# Patient Record
Sex: FEMALE | Race: WHITE | Employment: UNEMPLOYED | URBAN - METROPOLITAN AREA
[De-identification: names, ages, dates, MRNs, and addresses within clinical notes are randomized per-mention and may not be internally consistent; named-entity substitution may affect disease eponyms.]

---

## 2020-09-24 ENCOUNTER — APPOINTMENT (OUTPATIENT)
Dept: ULTRASOUND IMAGING | Age: 20
End: 2020-09-24
Payer: COMMERCIAL

## 2020-09-24 ENCOUNTER — HOSPITAL ENCOUNTER (EMERGENCY)
Age: 20
Discharge: HOME | End: 2020-09-24
Attending: EMERGENCY MEDICINE
Payer: COMMERCIAL

## 2020-09-24 VITALS
TEMPERATURE: 98.6 F | WEIGHT: 200 LBS | DIASTOLIC BLOOD PRESSURE: 92 MMHG | RESPIRATION RATE: 14 BRPM | BODY MASS INDEX: 32.14 KG/M2 | SYSTOLIC BLOOD PRESSURE: 148 MMHG | OXYGEN SATURATION: 100 % | HEIGHT: 66 IN | HEART RATE: 85 BPM

## 2020-09-24 LAB
-: ABNORMAL
ABO/RH: NORMAL
AMORPHOUS: ABNORMAL
ANTIBODY SCREEN: NEGATIVE
ARM BAND NUMBER: NORMAL
BACTERIA: ABNORMAL
BILIRUBIN URINE: NEGATIVE
CASTS UA: ABNORMAL /LPF (ref 0–8)
COLOR: YELLOW
CRYSTALS, UA: ABNORMAL /HPF
DIRECT EXAM: ABNORMAL
EPITHELIAL CELLS UA: ABNORMAL /HPF (ref 0–5)
EXPIRATION DATE: NORMAL
GLUCOSE URINE: NEGATIVE
HCG QUANTITATIVE: 17 IU/L
HCT VFR BLD CALC: 42.3 % (ref 36.3–47.1)
HEMOGLOBIN: 14.1 G/DL (ref 11.9–15.1)
KETONES, URINE: NEGATIVE
LEUKOCYTE ESTERASE, URINE: ABNORMAL
Lab: ABNORMAL
MUCUS: ABNORMAL
NITRITE, URINE: NEGATIVE
OTHER OBSERVATIONS UA: ABNORMAL
PH UA: 5.5 (ref 5–8)
PROTEIN UA: ABNORMAL
RBC UA: ABNORMAL /HPF (ref 0–4)
RENAL EPITHELIAL, UA: ABNORMAL /HPF
SPECIFIC GRAVITY UA: 1.02 (ref 1–1.03)
SPECIMEN DESCRIPTION: ABNORMAL
TRICHOMONAS: ABNORMAL
TURBIDITY: CLEAR
URINE HGB: ABNORMAL
UROBILINOGEN, URINE: NORMAL
WBC UA: ABNORMAL /HPF (ref 0–5)
YEAST: ABNORMAL

## 2020-09-24 PROCEDURE — 99284 EMERGENCY DEPT VISIT MOD MDM: CPT

## 2020-09-24 PROCEDURE — 87510 GARDNER VAG DNA DIR PROBE: CPT

## 2020-09-24 PROCEDURE — 84702 CHORIONIC GONADOTROPIN TEST: CPT

## 2020-09-24 PROCEDURE — 86403 PARTICLE AGGLUT ANTBDY SCRN: CPT

## 2020-09-24 PROCEDURE — 87480 CANDIDA DNA DIR PROBE: CPT

## 2020-09-24 PROCEDURE — 86850 RBC ANTIBODY SCREEN: CPT

## 2020-09-24 PROCEDURE — 6360000002 HC RX W HCPCS: Performed by: EMERGENCY MEDICINE

## 2020-09-24 PROCEDURE — 81001 URINALYSIS AUTO W/SCOPE: CPT

## 2020-09-24 PROCEDURE — 87591 N.GONORRHOEAE DNA AMP PROB: CPT

## 2020-09-24 PROCEDURE — 86901 BLOOD TYPING SEROLOGIC RH(D): CPT

## 2020-09-24 PROCEDURE — 85018 HEMOGLOBIN: CPT

## 2020-09-24 PROCEDURE — 76817 TRANSVAGINAL US OBSTETRIC: CPT

## 2020-09-24 PROCEDURE — 96372 THER/PROPH/DIAG INJ SC/IM: CPT

## 2020-09-24 PROCEDURE — 87086 URINE CULTURE/COLONY COUNT: CPT

## 2020-09-24 PROCEDURE — 86900 BLOOD TYPING SEROLOGIC ABO: CPT

## 2020-09-24 PROCEDURE — 87660 TRICHOMONAS VAGIN DIR PROBE: CPT

## 2020-09-24 PROCEDURE — 6370000000 HC RX 637 (ALT 250 FOR IP): Performed by: EMERGENCY MEDICINE

## 2020-09-24 PROCEDURE — 87491 CHLMYD TRACH DNA AMP PROBE: CPT

## 2020-09-24 PROCEDURE — 85014 HEMATOCRIT: CPT

## 2020-09-24 RX ORDER — ACETAMINOPHEN 325 MG/1
650 TABLET ORAL EVERY 6 HOURS PRN
Qty: 20 TABLET | Refills: 0 | Status: SHIPPED | OUTPATIENT
Start: 2020-09-24 | End: 2021-06-09

## 2020-09-24 RX ORDER — CEPHALEXIN 500 MG/1
500 CAPSULE ORAL ONCE
Status: COMPLETED | OUTPATIENT
Start: 2020-09-24 | End: 2020-09-24

## 2020-09-24 RX ORDER — CEPHALEXIN 500 MG/1
500 CAPSULE ORAL 2 TIMES DAILY
Qty: 20 CAPSULE | Refills: 0 | Status: SHIPPED | OUTPATIENT
Start: 2020-09-24 | End: 2020-10-04

## 2020-09-24 RX ORDER — METRONIDAZOLE 500 MG/1
500 TABLET ORAL 2 TIMES DAILY
Qty: 20 TABLET | Refills: 0 | Status: SHIPPED | OUTPATIENT
Start: 2020-09-24 | End: 2020-10-04

## 2020-09-24 RX ORDER — ACETAMINOPHEN 325 MG/1
650 TABLET ORAL ONCE
Status: COMPLETED | OUTPATIENT
Start: 2020-09-24 | End: 2020-09-24

## 2020-09-24 RX ADMIN — CEPHALEXIN 500 MG: 500 CAPSULE ORAL at 22:29

## 2020-09-24 RX ADMIN — ACETAMINOPHEN 650 MG: 325 TABLET ORAL at 19:05

## 2020-09-24 RX ADMIN — HUMAN RHO(D) IMMUNE GLOBULIN 300 MCG: 300 INJECTION, SOLUTION INTRAMUSCULAR at 22:27

## 2020-09-24 ASSESSMENT — ENCOUNTER SYMPTOMS
DIARRHEA: 0
VOMITING: 0
SHORTNESS OF BREATH: 0
BACK PAIN: 0
ALLERGIC/IMMUNOLOGIC COMMENTS: NKDA
CONSTIPATION: 0
ABDOMINAL PAIN: 1
NAUSEA: 0

## 2020-09-24 ASSESSMENT — PAIN DESCRIPTION - FREQUENCY: FREQUENCY: CONTINUOUS

## 2020-09-24 ASSESSMENT — PAIN DESCRIPTION - ORIENTATION: ORIENTATION: LOWER

## 2020-09-24 ASSESSMENT — PAIN SCALES - GENERAL: PAINLEVEL_OUTOF10: 5

## 2020-09-24 ASSESSMENT — PAIN DESCRIPTION - DESCRIPTORS: DESCRIPTORS: CRAMPING

## 2020-09-24 ASSESSMENT — PAIN DESCRIPTION - LOCATION: LOCATION: ABDOMEN

## 2020-09-24 ASSESSMENT — PAIN DESCRIPTION - PAIN TYPE: TYPE: ACUTE PAIN

## 2020-09-24 NOTE — ED PROVIDER NOTES
[facility]  Emergency Department Encounter  Emergency Medicine Resident     Pt Name: Abraham Herrera  MRN: [id number]  [place] 2000  Date of evaluation: 9/24/20  PCP:  GORDON Hair [id number]       Chief Complaint   Patient presents with    Abdominal Cramping     cramping and mild spotting    Pregnancy Test     had positive preg test yesterday       HISTORY OF PRESENT ILLNESS  (Location/Symptom, Timing/Onset, Context/Setting, Quality, Duration, Modifying Factors, Severity.)    Abraham Herrera is a 21 y.o. female who presents with 1 day of abdominal cramping, lower abdominal pain, vaginal bleeding. She describes the vaginal bleeding is light and spotting. Patient has irregular periods and her last menstrual cycle was approximately 3 months ago. She had 3+ urine home pregnancy tests yesterday. This is her first pregnancy. No urinary symptoms. No concern for any STDs. No lightheaded or dizziness, weakness, back pain, syncope. PAST MEDICAL / SURGICAL / SOCIAL / FAMILY HISTORY    has no past medical history on file. Denies   has no past surgical history on file.   Denies  Social History     Socioeconomic History    Marital status: Single     Spouse name: Not on file    Number of children: Not on file    Years of education: Not on file    Highest education level: Not on file   Occupational History    Not on file   Social Needs    Financial resource strain: Not on file    Food insecurity     Worry: Not on file     Inability: Not on file    Transportation needs     Medical: Not on file     Non-medical: Not on file   Tobacco Use    Smoking status: Never Smoker   Substance and Sexual Activity    Alcohol use: Not Currently    Drug use: Never    Sexual activity: Not on file   Lifestyle    Physical activity     Days per week: Not on file     Minutes per session: Not on file    Stress: Not on file   Relationships    Social connections     Talks on phone: Not on file     Gets together: Not on file     Attends [employer] service: Not on file     Active member of club or organization: Not on file     Attends meetings of clubs or organizations: Not on file     Relationship status: Not on file    Intimate partner violence     Fear of current or ex partner: Not on file     Emotionally abused: Not on file     Physically abused: Not on file     Forced sexual activity: Not on file   Other Topics Concern    Not on file   Social History Narrative    Not on file       History reviewed. No pertinent family history. Allergies:    Patient has no known allergies. Home Medications:  Prior to Admission medications    Medication Sig Start Date End Date Taking? Authorizing Provider   cephALEXin (KEFLEX) 500 MG capsule Take 1 capsule by mouth 2 times daily for 10 days 9/24/20 10/4/20 Yes Joy Lee, DO   acetaminophen (TYLENOL) 325 MG tablet Take 2 tablets by mouth every 6 hours as needed for Pain 9/24/20  Yes Joy Lee, DO   metroNIDAZOLE (FLAGYL) 500 MG tablet Take 1 tablet by mouth 2 times daily for 10 days 9/24/20 10/4/20 Yes Joy Lee DO       REVIEW OF SYSTEMS    (2-9 systems for level 4, 10 or more for level 5)    Review of Systems   Constitutional: Negative for chills, diaphoresis and fever. Respiratory: Negative for shortness of breath. Cardiovascular: Negative for chest pain and palpitations. Gastrointestinal: Positive for abdominal pain. Negative for constipation, diarrhea, nausea and vomiting. Endocrine: Negative for polyuria. Genitourinary: Positive for menstrual problem, pelvic pain and vaginal bleeding. Negative for decreased urine volume, difficulty urinating, dysuria, flank pain, frequency and urgency. Musculoskeletal: Negative for back pain and myalgias. Skin: Negative for pallor. Allergic/Immunologic:        NKDA   Neurological: Negative for dizziness, syncope, weakness and light-headedness.    Hematological: Does not Behavior normal.         DIFFERENTIAL  DIAGNOSIS   PLAN (LABS / IMAGING / EKG):  Orders Placed This Encounter   Procedures    C.trachomatis N.gonorrhoeae DNA    VAGINITIS DNA PROBE    Culture, Urine    US OB TRANSVAGINAL    HEMOGLOBIN AND HEMATOCRIT, BLOOD    HCG, QUANTITATIVE, PREGNANCY    Urinalysis with Microscopic    Vaginal exam    TYPE AND SCREEN    RHOGAM INJECTION ONLY    Insert peripheral IV       MEDICATIONS ORDERED:  Orders Placed This Encounter   Medications    acetaminophen (TYLENOL) tablet 650 mg    cephALEXin (KEFLEX) capsule 500 mg     Order Specific Question:   Antimicrobial Indications     Answer:   Urinary Tract Infection     Order Specific Question:   UTI duration of therapy     Answer:   10 days    rho(D) immune globulin (HYPERRHO S/D) injection 300 mcg    cephALEXin (KEFLEX) 500 MG capsule     Sig: Take 1 capsule by mouth 2 times daily for 10 days     Dispense:  20 capsule     Refill:  0    acetaminophen (TYLENOL) 325 MG tablet     Sig: Take 2 tablets by mouth every 6 hours as needed for Pain     Dispense:  20 tablet     Refill:  0    metroNIDAZOLE (FLAGYL) 500 MG tablet     Sig: Take 1 tablet by mouth 2 times daily for 10 days     Dispense:  20 tablet     Refill:  0       DDX:   Implantation bleeding, spontaneous , ectopic pregnancy, early intrauterine pregnancy, urinary tract infection    DIAGNOSTIC RESULTS / EMERGENCYDEPARTMENT COURSE / MDM   LABS:  Labs Reviewed   VAGINITIS DNA PROBE - Abnormal; Notable for the following components:       Result Value    Direct Exam POSITIVE for Gardnerella vaginalis.  (*)     All other components within normal limits   HCG, QUANTITATIVE, PREGNANCY - Abnormal; Notable for the following components:    hCG Quant 17 (*)     All other components within normal limits   URINALYSIS WITH MICROSCOPIC - Abnormal; Notable for the following components:    Urine Hgb LARGE (*)     Protein, UA 1+ (*)     Leukocyte Esterase, Urine MODERATE (*) Bacteria, UA FEW (*)     All other components within normal limits   C.TRACHOMATIS N.GONORRHOEAE DNA   CULTURE, URINE   HEMOGLOBIN AND HEMATOCRIT, BLOOD   TYPE AND SCREEN   RHOGAM INJECTION ONLY       RADIOLOGY:  Us Ob Transvaginal    Result Date: 9/24/2020  EXAMINATION: FIRST TRIMESTER OBSTETRIC ULTRASOUND 9/24/2020 TECHNIQUE: Transvaginal first trimester obstetric pelvic ultrasound was performed with color Doppler flow evaluation. COMPARISON: None HISTORY: ORDERING SYSTEM PROVIDED HISTORY: + pregnancy, vaginal bleeding, abd cramping TECHNOLOGIST PROVIDED HISTORY: + pregnancy, vaginal bleeding, abd cramping FINDINGS: Uterus: 8.1 x 5.4 x 4.1 cm Gestational Sac(s):  Not visualized Yolk Sac:  Not visualized Fetal Pole:  Not visualized Crown Rump Length:  Not visualized Fetal Heart Rate:  Not visualized Right ovary: 3.6 x 3.4 x 2.6 cm. Arterial and venous flow identified to the right ovary. Left ovary: 2.2 x 3.5 x 2.3 cm. Arterial and venous flow identified to the left ovary. Free fluid: None     No intrauterine pregnancy identified. No gestational sac or yolk sac. Arterial and venous spectral flow identified to the bilateral ovaries. EMERGENCY DEPARTMENT COURSE:  ED Course as of Sep 24 2212   Thu Sep 24, 2020   2014 Hemoglobin Quant: 14.1 [AM]   2014 Pelvic done    [AM]   2045 hCG Quant(!): 17 [AM]   2121 DIRECT EXAM.(!): POSITIVE for Gardnerella vaginalis. [AM]   2133 Urinalysis with Microscopic(!):    Color, UA YELLOW   Turbidity UA CLEAR   Glucose, UA NEGATIVE   Bilirubin, Urine NEGATIVE   Ketones, Urine NEGATIVE   Specific Gravity, UA 1.020   Urine Hgb LARGE(!)   pH, UA 5.5   Protein, UA 1+(!)   Urobilinogen, Urine Normal   Nitrite, Urine NEGATIVE   Leukocyte Esterase, Urine MODERATE(!)   -        WBC, UA 20 TO 50   RBC, UA 5 TO 10   Casts UA 0 TO 2 HYALINE Reference range defined for non-centrifuged specimen. Crystals, UA NOT REPORTED   Epithelial Cells, UA 2 TO 5   Ta. .. [AM]   2158  OB TRANSVAGINAL [AM]      ED Course User Index  [AM] Mayelin Pouch, DO       MDM  Number of Diagnoses or Management Options  Acute cystitis with hematuria:   Bacterial vaginosis:   Vaginal bleeding in pregnancy, first trimester:   Diagnosis management comments: 55-year-old female G1, P0 presents to the emergency department with at home positive pregnancy test and abdominal cramping and vaginal bleeding. Lower abdominal tenderness to palpation on exam.  Pelvic done with scant mucoid discharge with no active vaginal bleeding. hCG serum 17. Patient O-, administered RhoGam during visit. Transvaginal ultrasound did not show any evidence of pregnancy. Discussed with patient that she is either very early on in pregnancy where she is having a miscarriage. We discussed in length the importance of her having her blood drawn in 48 hours for repeat hormone levels and we discussed following up with the [facility] clinic. Urinalysis concerning for infection. Vaginitis probe positive for bacterial vaginosis. Patient discharged home on Keflex and Flagyl as well as Tylenol for the pain. Amount and/or Complexity of Data Reviewed  Clinical lab tests: ordered and reviewed  Tests in the radiology section of CPT®: ordered and reviewed  Review and summarize past medical records: yes  Discuss the patient with other providers: yes  Independent visualization of images, tracings, or specimens: yes    Patient Progress  Patient progress: stable    CONSULTS:  None    CRITICAL CARE:  Please see attending note    FINAL IMPRESSION     1. Vaginal bleeding in pregnancy, first trimester    2. Bacterial vaginosis    3. Acute cystitis with hematuria      DISPOSITION / PLAN   DISPOSITION        Evaluation and treatment course in the ED, and plan of care upon discharge was discussed in length with the patient. Patient had no further questions prior to being discharged and was instructed to return to the ED for new or worsening symptoms. Any changes to existing medications or new prescriptions were reviewed with patient and they expressed understanding of how to correctly take their medications and the possible side effects. PATIENT REFERRED TO:  GORDON Christianson CNP  [address] [facility] [address]  [address] [facility] [address]  [address] [facility]  [phone]    As needed, If symptoms worsen    [address] [facility]  [phone]  Schedule an appointment as soon as possible for a visit   you need to have your blood drawn in 48 hours to check your hormone levels      DISCHARGE MEDICATIONS:  New Prescriptions    ACETAMINOPHEN (TYLENOL) 325 MG TABLET    Take 2 tablets by mouth every 6 hours as needed for Pain    CEPHALEXIN (KEFLEX) 500 MG CAPSULE    Take 1 capsule by mouth 2 times daily for 10 days    METRONIDAZOLE (FLAGYL) 500 MG TABLET    Take 1 tablet by mouth 2 times daily for 10 days       Joy Jackson  Emergency Medicine Resident Physician, PGY-3    (Please note that portions of this note were completed with a voice recognition program.  Efforts were made to edit the dictations but occasionally words are mis-transcribed.)        [facility], DO  Resident  09/24/20 8980

## 2020-09-24 NOTE — ED PROVIDER NOTES
[facility]     Emergency Department     Faculty Attestation    I performed a history and physical examination of the patient and discussed management with the resident. I reviewed the residents note and agree with the documented findings and plan of care. Any areas of disagreement are noted on the chart. I was personally present for the key portions of any procedures. I have documented in the chart those procedures where I was not present during the key portions. I have reviewed the emergency nurses triage note. I agree with the chief complaint, past medical history, past surgical history, allergies, medications, social and family history as documented unless otherwise noted below. For Physician Assistant/ Nurse Practitioner cases/documentation I have personally evaluated this patient and have completed at least one if not all key elements of the E/M (history, physical exam, and MDM). Additional findings are as noted. Primary Care Physician:  GORDON Hardy - CNP    CHIEF COMPLAINT       Chief Complaint   Patient presents with    Abdominal Cramping     cramping and mild spotting    Pregnancy Test     had positive preg test yesterday       RECENT VITALS:   Temp: 98.6 °F (37 °C),  Pulse: 85, Resp: 14, BP: (!) 148/92    LABS:  Labs Reviewed   C.TRACHOMATIS N.GONORRHOEAE DNA   VAGINITIS DNA PROBE   CULTURE, URINE   HEMOGLOBIN AND HEMATOCRIT, BLOOD   HCG, QUANTITATIVE, PREGNANCY   URINALYSIS WITH MICROSCOPIC   TYPE AND SCREEN       Radiology  US OB TRANSVAGINAL    (Results Pending)         Attending Physician Additional  Notes    The patient is a 54-year-old female who is a G1, P0 who presents for evaluation of vaginal bleeding and suprapubic cramping. The patient reports that she has history of irregular menstrual periods and she has been having unprotected intercourse with her boyfriend. She states that her last menstrual period was approximately 3 months ago.   The patient reports that she was at work when she began having intermittent, dull, achy, suprapubic abdominal cramping with associated vaginal spotting. Upon arrival to the emergency department she states that her symptoms have resolved. She has taken 3 home pregnancy tests which were all positive. The patient has not taken any medications for her symptoms and does not list any provoking or palliating factors. She denies any leakage of fluid, contractions, or vaginal discharge. The patient denies fever, chills, headache, vision changes, nausea, vomiting, chest pain, shortness of breath, urinary/bowel symptoms, recent injury or illness. Vital signs are stable. Heart regular rate and rhythm. Lungs clear to auscultation. Abdomen soft and nontender on my exam.  Capillary fill less than 2 seconds. No CVA tenderness. Plan for resident to perform pelvic exam.  Plan to obtain pelvic swabs, hCG quantitative, H&H, type and screen, urinalysis with separate culture and transvaginal ultrasound. I suspect the patient is either having a threatened miscarriage or nonspecific vaginal bleeding in early pregnancy.           Jeffery Webb DO  Attending Emergency Physician           Jeffery Webb DO  09/24/20 1934

## 2020-09-24 NOTE — ED NOTES
Pt to ED with c/o lower abd cramping, states she took home preg test yesterday and it was positive. Today pt states she had mild bleeding. Pt in NAD, rr even and unlabored. Pt in NAD.       Dary Hahn RN  09/24/20 [id number]

## 2020-09-25 LAB
C TRACH DNA GENITAL QL NAA+PROBE: ABNORMAL
CULTURE: ABNORMAL
Lab: ABNORMAL
N. GONORRHOEAE DNA: NEGATIVE
SPECIMEN DESCRIPTION: ABNORMAL
SPECIMEN DESCRIPTION: ABNORMAL

## 2020-09-25 NOTE — ED NOTES
Pt resting on cot, NAD noted, significant other at bedside. Awaiting pelvic labs and US.       Dary Hahn RN  09/24/20 2026

## 2020-09-25 NOTE — ED PROVIDER NOTES
[address] [facility]  Emergency Department  Emergency Medicine Resident Sign-out     Care of Nickolas Aguillon was assumed from Dr. Drew Mares and is being seen for Abdominal Cramping (cramping and mild spotting) and Pregnancy Test (had positive preg test yesterday)  . The patient's initial evaluation and plan have been discussed with the prior provider who initially evaluated the patient. EMERGENCY DEPARTMENT COURSE / MEDICAL DECISION MAKING:       MEDICATIONS GIVEN:  Orders Placed This Encounter   Medications    acetaminophen (TYLENOL) tablet 650 mg    cephALEXin (KEFLEX) capsule 500 mg     Order Specific Question:   Antimicrobial Indications     Answer:   Urinary Tract Infection     Order Specific Question:   UTI duration of therapy     Answer:   10 days    rho(D) immune globulin (HYPERRHO S/D) injection 300 mcg    cephALEXin (KEFLEX) 500 MG capsule     Sig: Take 1 capsule by mouth 2 times daily for 10 days     Dispense:  20 capsule     Refill:  0    acetaminophen (TYLENOL) 325 MG tablet     Sig: Take 2 tablets by mouth every 6 hours as needed for Pain     Dispense:  20 tablet     Refill:  0    metroNIDAZOLE (FLAGYL) 500 MG tablet     Sig: Take 1 tablet by mouth 2 times daily for 10 days     Dispense:  20 tablet     Refill:  0       LABS / RADIOLOGY:     Labs Reviewed   VAGINITIS DNA PROBE - Abnormal; Notable for the following components:       Result Value    Direct Exam POSITIVE for Gardnerella vaginalis.  (*)     All other components within normal limits   HCG, QUANTITATIVE, PREGNANCY - Abnormal; Notable for the following components:    hCG Quant 17 (*)     All other components within normal limits   URINALYSIS WITH MICROSCOPIC - Abnormal; Notable for the following components:    Urine Hgb LARGE (*)     Protein, UA 1+ (*)     Leukocyte Esterase, Urine MODERATE (*)     Bacteria, UA FEW (*)     All other components within normal limits   C.TRACHOMATIS N.GONORRHOEAE DNA   CULTURE, URINE HEMOGLOBIN AND HEMATOCRIT, BLOOD   TYPE AND SCREEN   RHOGAM INJECTION ONLY       Us Ob Transvaginal    Result Date: 9/24/2020  EXAMINATION: FIRST TRIMESTER OBSTETRIC ULTRASOUND 9/24/2020 TECHNIQUE: Transvaginal first trimester obstetric pelvic ultrasound was performed with color Doppler flow evaluation. COMPARISON: None HISTORY: ORDERING SYSTEM PROVIDED HISTORY: + pregnancy, vaginal bleeding, abd cramping TECHNOLOGIST PROVIDED HISTORY: + pregnancy, vaginal bleeding, abd cramping FINDINGS: Uterus: 8.1 x 5.4 x 4.1 cm Gestational Sac(s):  Not visualized Yolk Sac:  Not visualized Fetal Pole:  Not visualized Crown Rump Length:  Not visualized Fetal Heart Rate:  Not visualized Right ovary: 3.6 x 3.4 x 2.6 cm. Arterial and venous flow identified to the right ovary. Left ovary: 2.2 x 3.5 x 2.3 cm. Arterial and venous flow identified to the left ovary. Free fluid: None     No intrauterine pregnancy identified. No gestational sac or yolk sac. Arterial and venous spectral flow identified to the bilateral ovaries. RECENT VITALS:     Temp: 98.6 °F (37 °C),  Pulse: 85, Resp: 14, BP: (!) 148/92, SpO2: 100 %    This patient is a 21 y.o. Female with abdominal pain vaginal cramping with 3+ home pregnancy tests. History of O- and receiving RhoGam.  Transvaginal ultrasound was negative for any free fluid in the pelvis or any other signs and has a serum hCG of 17. Patient is to follow-up in 48 hours for repeat hCG and has an appointment already made with [facility] clinic. In addition she has had a UTI and is received Keflex for a biotics. Patient also has BV and is getting a prescription for Flagyl. OUTSTANDING TASKS / RECOMMENDATIONS:    1. Await discharge     FINAL IMPRESSION:     1. Vaginal bleeding in pregnancy, first trimester    2. Bacterial vaginosis    3.  Acute cystitis with hematuria        DISPOSITION:         DISPOSITION:  [x]  Discharge   []  Transfer -    []  Admission -     []  Against Medical Advice   []  Eloped   FOLLOW-UP: GORDON Burns CNP  [address] [facility] [address]  [address] [facility] [address]  [address] [place] [address]  [phone]    As needed, If symptoms worsen    [address] [facility] [address]  [address] [facility]  [phone]  Schedule an appointment as soon as possible for a visit   you need to have your blood drawn in 48 hours to check your hormone levels     DISCHARGE MEDICATIONS: New Prescriptions    ACETAMINOPHEN (TYLENOL) 325 MG TABLET    Take 2 tablets by mouth every 6 hours as needed for Pain    CEPHALEXIN (KEFLEX) 500 MG CAPSULE    Take 1 capsule by mouth 2 times daily for 10 days    METRONIDAZOLE (FLAGYL) 500 MG TABLET    Take 1 tablet by mouth 2 times daily for 10 days           Bianca Pang DO  Emergency Medicine Resident  [facility]       [facility]  Resident  09/24/20 7275

## 2020-09-26 LAB
BLD PROD TYP BPU: NORMAL
DISPENSE STATUS BLOOD BANK: NORMAL
TRANSFUSION STATUS: NORMAL
UNIT DIVISION: 0
UNIT NUMBER: NORMAL

## 2020-09-28 ENCOUNTER — TELEPHONE (OUTPATIENT)
Dept: PHARMACY | Age: 20
End: 2020-09-28

## 2020-09-28 RX ORDER — AZITHROMYCIN 500 MG/1
1000 TABLET, FILM COATED ORAL ONCE
Qty: 2 TABLET | Refills: 0 | Status: SHIPPED | OUTPATIENT
Start: 2020-09-28 | End: 2020-09-28

## 2020-09-28 NOTE — TELEPHONE ENCOUNTER
CLINICAL PHARMACY NOTE:  Telephone Follow-up for Positive STD Test    At the time of Brian Lama's visit to [facility] Emergency Department on 9/24/2020 STD testing was performed. DNA testing was positive for Chlamydia. Spoke to patient on 9/28/2020 to review test results and regarding need to start oral antibiotic therapy to treat the infection. Instructed patient to abstain from sexual intercourse until receiving the antibiotic and then for 7 days after treatment. Patient also advised to inform any partners that they will need to be tested as well. Patient verbally expressed understanding of above plan. Per protocol, prescription for azithromycin 500 mg PO x 2 tablets sent to [facility] on behalf of Dr. Andre Galaviz.           Jeet Gr, Pharmacy Student

## 2020-09-28 NOTE — TELEPHONE ENCOUNTER
Attempt #1 to call patient to review results of recent STD testing. No answer, left VM for patient to call back at [phone].     Shawn Frausto, Pharmacy Student  9/28/2020 @9:42am

## 2020-09-30 ENCOUNTER — HOSPITAL ENCOUNTER (EMERGENCY)
Age: 20
Discharge: HOME | End: 2020-10-01
Attending: EMERGENCY MEDICINE
Payer: COMMERCIAL

## 2020-09-30 ENCOUNTER — APPOINTMENT (OUTPATIENT)
Dept: ULTRASOUND IMAGING | Age: 20
End: 2020-09-30
Payer: COMMERCIAL

## 2020-09-30 VITALS
DIASTOLIC BLOOD PRESSURE: 81 MMHG | WEIGHT: 200 LBS | HEIGHT: 66 IN | RESPIRATION RATE: 16 BRPM | BODY MASS INDEX: 32.14 KG/M2 | TEMPERATURE: 97.9 F | SYSTOLIC BLOOD PRESSURE: 139 MMHG | OXYGEN SATURATION: 96 % | HEART RATE: 91 BPM

## 2020-09-30 LAB
BLD PROD TYP BPU: NORMAL
DISPENSE STATUS BLOOD BANK: NORMAL
HCG QUANTITATIVE: 401 IU/L
TRANSFUSION STATUS: NORMAL
UNIT DIVISION: 0
UNIT NUMBER: NORMAL

## 2020-09-30 PROCEDURE — 99284 EMERGENCY DEPT VISIT MOD MDM: CPT

## 2020-09-30 PROCEDURE — 76817 TRANSVAGINAL US OBSTETRIC: CPT

## 2020-09-30 PROCEDURE — 84702 CHORIONIC GONADOTROPIN TEST: CPT

## 2020-09-30 ASSESSMENT — PAIN DESCRIPTION - LOCATION: LOCATION: ABDOMEN

## 2020-09-30 ASSESSMENT — PAIN DESCRIPTION - PAIN TYPE: TYPE: ACUTE PAIN

## 2020-09-30 ASSESSMENT — PAIN DESCRIPTION - ORIENTATION: ORIENTATION: LOWER

## 2020-09-30 ASSESSMENT — PAIN DESCRIPTION - DESCRIPTORS: DESCRIPTORS: CRAMPING

## 2020-09-30 ASSESSMENT — PAIN SCALES - GENERAL: PAINLEVEL_OUTOF10: 4

## 2020-10-01 ENCOUNTER — OFFICE VISIT (OUTPATIENT)
Dept: OBGYN CLINIC | Age: 20
End: 2020-10-01
Payer: COMMERCIAL

## 2020-10-01 VITALS
HEART RATE: 78 BPM | WEIGHT: 211 LBS | HEIGHT: 66 IN | DIASTOLIC BLOOD PRESSURE: 70 MMHG | BODY MASS INDEX: 33.91 KG/M2 | TEMPERATURE: 98 F | SYSTOLIC BLOOD PRESSURE: 116 MMHG

## 2020-10-01 PROCEDURE — 99213 OFFICE O/P EST LOW 20 MIN: CPT | Performed by: NURSE PRACTITIONER

## 2020-10-01 PROCEDURE — 4004F PT TOBACCO SCREEN RCVD TLK: CPT | Performed by: NURSE PRACTITIONER

## 2020-10-01 PROCEDURE — G8427 DOCREV CUR MEDS BY ELIG CLIN: HCPCS | Performed by: NURSE PRACTITIONER

## 2020-10-01 PROCEDURE — G8419 CALC BMI OUT NRM PARAM NOF/U: HCPCS | Performed by: NURSE PRACTITIONER

## 2020-10-01 PROCEDURE — G8484 FLU IMMUNIZE NO ADMIN: HCPCS | Performed by: NURSE PRACTITIONER

## 2020-10-01 RX ORDER — PNV NO.95/FERROUS FUM/FOLIC AC 28MG-0.8MG
1 TABLET ORAL DAILY
Qty: 30 TABLET | Refills: 12 | Status: SHIPPED | OUTPATIENT
Start: 2020-10-01 | End: 2020-11-01 | Stop reason: SDUPTHER

## 2020-10-01 ASSESSMENT — ENCOUNTER SYMPTOMS
ABDOMINAL PAIN: 0
WHEEZING: 0
RHINORRHEA: 0
ABDOMINAL DISTENTION: 0
DIARRHEA: 0
CHEST TIGHTNESS: 0
PHOTOPHOBIA: 0
VOMITING: 0
CONSTIPATION: 0
NAUSEA: 0
SORE THROAT: 0
TROUBLE SWALLOWING: 0
BACK PAIN: 0
SHORTNESS OF BREATH: 0

## 2020-10-01 NOTE — ED PROVIDER NOTES
[facility]  Emergency Department  Faculty Attestation     I performed a history and physical examination of the patient and discussed management with the resident. I reviewed the residents note and agree with the documented findings and plan of care. Any areas of disagreement are noted on the chart. I was personally present for the key portions of any procedures. I have documented in the chart those procedures where I was not present during the key portions. I have reviewed the emergency nurses triage note. I agree with the chief complaint, past medical history, past surgical history, allergies, medications, social and family history as documented unless otherwise noted below. For Physician Assistant/ Nurse Practitioner cases/documentation I have personally evaluated this patient and have completed at least one if not all key elements of the E/M (history, physical exam, and MDM). Additional findings are as noted. Primary Care Physician:  GORDON Peguero - CNP    Screenings:  [unfilled]    CHIEF COMPLAINT       Chief Complaint   Patient presents with    Vaginal Bleeding     Less than 8 weeks pregnant, has not seen OB yet       RECENT VITALS:   Temp: 97.9 °F (36.6 °C),  Pulse: 91, Resp: 16, BP: 139/81    LABS:  Labs Reviewed   HCG, QUANTITATIVE, PREGNANCY       Radiology  US OB TRANSVAGINAL    (Results Pending)           Attending Physician Additional  Notes    Patient is a G1, P0 Rh- with vaginal bleeding. She is uncertain regarding dates and thought she was 3 months pregnant, chart visit from last week would suggest that she is approximately 8 weeks pregnant. Rh was low on last visit. She did receive RhoGam.  She is had increased pain and cramps. On exam she is nontoxic afebrile vital signs normal.  Normal color, no pallor, normal capillary refill. Abdomen is soft, no pelvic tenderness.   Plan is quantitative hCG, repeat pelvic exam, repeat pelvic ultrasound, Tylenol and reassess. Differential includes threatened miscarriage, incomplete AB, ectopic pregnancy. Casey Armenta.  Malick Riley MD, [address] [facility]  Attending Emergency  Physician                Andrea Murdock MD  09/30/20 2088

## 2020-10-01 NOTE — ED PROVIDER NOTES
[facility] ED  Emergency Department Encounter  Emergency Medicine Resident     Pt Name: Darya Khanna  MRN: [id number]  [place] 2000  Date of evaluation: 9/30/20  PCP:  Marta Miller       Chief Complaint   Patient presents with    Vaginal Bleeding     Less than 8 weeks pregnant, has not seen OB yet       HISTORY OFPRESENT ILLNESS  (Location/Symptom, Timing/Onset, Context/Setting, Quality, Duration, Modifying Сергей Jimenes.)      Darya Khanna is a 21 y.o. female who presents with concerns for painless vaginal bleeding. Patient was seen here on 9/24 where she had a positive pregnancy test with a beta-hCG of 17. Patient also was diagnosed with bacterial vaginitis as well as a urinary tract infection at that time for which she has been treated with Flagyl and Keflex without issue. Patient denies vaginal discharge or dysuria, states that the symptoms have resolved, states that with this pregnancy she is concerned for her pain with vaginal bleeding. Patient has not seen an OB/GYN yet, has a scheduled appointment tomorrow to establish follow-up. Patient denies lightheadedness, dizziness, fever, chills, patient is notably Rh-, received RhoGam on her previous visit to the emergency department secondary to vaginal bleeding at that time. PAST MEDICAL / SURGICAL / SOCIAL / FAMILY HISTORY      has no past medical history on file. has no past surgical history on file.      Social History     Socioeconomic History    Marital status: Single     Spouse name: Not on file    Number of children: Not on file    Years of education: Not on file    Highest education level: Not on file   Occupational History    Not on file   Social Needs    Financial resource strain: Not on file    Food insecurity     Worry: Not on file     Inability: Not on file    Transportation needs     Medical: Not on file     Non-medical: Not on file   Tobacco Use    Smoking status: Never Smoker   Substance and Sexual Activity    Alcohol use: Not Currently    Drug use: Never    Sexual activity: Not on file   Lifestyle    Physical activity     Days per week: Not on file     Minutes per session: Not on file    Stress: Not on file   Relationships    Social connections     Talks on phone: Not on file     Gets together: Not on file     Attends [employer] service: Not on file     Active member of club or organization: Not on file     Attends meetings of clubs or organizations: Not on file     Relationship status: Not on file    Intimate partner violence     Fear of current or ex partner: Not on file     Emotionally abused: Not on file     Physically abused: Not on file     Forced sexual activity: Not on file   Other Topics Concern    Not on file   Social History Narrative    Not on file       History reviewed. No pertinent family history. Allergies:  Patient has no known allergies. Home Medications:  Prior to Admission medications    Medication Sig Start Date End Date Taking? Authorizing Provider   cephALEXin (KEFLEX) 500 MG capsule Take 1 capsule by mouth 2 times daily for 10 days 9/24/20 10/4/20  Joy BEN Lee, DO   acetaminophen (TYLENOL) 325 MG tablet Take 2 tablets by mouth every 6 hours as needed for Pain 9/24/20   Joy M Mukuli, DO   metroNIDAZOLE (FLAGYL) 500 MG tablet Take 1 tablet by mouth 2 times daily for 10 days 9/24/20 10/4/20  Joy M Mukuli, DO       REVIEW OFSYSTEMS    (2-9 systems for level 4, 10 or more for level 5)      Review of Systems   Constitutional: Negative for chills, fatigue and fever. HENT: Negative for hearing loss, rhinorrhea, sneezing, sore throat, tinnitus and trouble swallowing. Eyes: Negative for photophobia and visual disturbance. Respiratory: Negative for chest tightness, shortness of breath and wheezing. Cardiovascular: Negative for chest pain and palpitations.    Gastrointestinal: Negative for abdominal distention, abdominal pain, constipation, diarrhea, nausea and vomiting. Endocrine: Negative for polyuria. Genitourinary: Positive for vaginal bleeding. Negative for dysuria, flank pain, frequency and vaginal discharge. Musculoskeletal: Negative for arthralgias, back pain, gait problem and neck pain. Neurological: Negative for dizziness, tremors, seizures, syncope, facial asymmetry, numbness and headaches. Psychiatric/Behavioral: Negative for self-injury and suicidal ideas. PHYSICAL EXAM   (up to 7 for level 4, 8 or more forlevel 5)      INITIAL VITALS:   ED Triage Vitals [09/30/20 2049]   BP Temp Temp Source Pulse Resp SpO2 Height Weight   -- 97.9 °F (36.6 °C) Oral -- -- -- -- --       Physical Exam  Constitutional:       General: She is not in acute distress. Appearance: She is obese. She is not toxic-appearing or diaphoretic. HENT:      Head: Normocephalic and atraumatic. Eyes:      Extraocular Movements: Extraocular movements intact. Neck:      Musculoskeletal: No neck rigidity or muscular tenderness. Cardiovascular:      Rate and Rhythm: Normal rate and regular rhythm. Pulses: Normal pulses. Pulmonary:      Effort: No respiratory distress. Breath sounds: Normal breath sounds. No wheezing. Abdominal:      General: There is no distension. Palpations: Abdomen is soft. Tenderness: There is no abdominal tenderness. Musculoskeletal: Normal range of motion. Skin:     General: Skin is dry. Neurological:      General: No focal deficit present. Mental Status: She is oriented to person, place, and time. Psychiatric:         Mood and Affect: Mood normal.         Behavior: Behavior normal.         Thought Content:  Thought content normal.         Judgment: Judgment normal.         DIFFERENTIAL  DIAGNOSIS     PLAN (LABS / IMAGING / EKG):  Orders Placed This Encounter   Procedures    US OB TRANSVAGINAL    HCG, QUANTITATIVE, PREGNANCY    RHOGAM INJECTION ONLY MEDICATIONS ORDERED:  Orders Placed This Encounter   Medications    DISCONTD: rho(D) immune globulin (HYPERRHO S/D) injection 300 mcg       DDX: Ectopic pregnancy, threatened , painless vaginal bleeding, vaginal bleeding first trimester pregnancy. Initial MDM/Plan/ED COURSE:    21 y.o. female who presents with concerns for pain as well as vaginal bleeding. Patient beta-hCG was 17 approximately 1 week ago, plan to repeat beta-hCG, plan to get a repeat transvaginal ultrasound in order to assess for ectopic pregnancy versus intrauterine gestation. Patient's beta-hCG is notably 500, transvaginal ultrasound shows no intrauterine pregnancy. Patient is currently denying pain at this time, does have follow-up with her obstetrics and gynecologist tomorrow at 1230, patient told to follow-up in 48 hours for repeat beta-hCG as well as a potential repeat transvaginal ultrasound. Patient has no questions at this time      :     DIAGNOSTIC RESULTS / EMERGENCYDEPARTMENT COURSE / MDM     LABS:  Labs Reviewed   HCG, QUANTITATIVE, PREGNANCY - Abnormal; Notable for the following components:       Result Value    hCG Quant 401 (*)     All other components within normal limits   RHOGAM INJECTION ONLY           Us Ob Transvaginal    Result Date: 2020  EXAMINATION: FIRST TRIMESTER OBSTETRIC ULTRASOUND 2020 TECHNIQUE: Transvaginal first trimester obstetric pelvic ultrasound was performed with color Doppler flow evaluation. COMPARISON: 6 days prior HISTORY: ORDERING SYSTEM PROVIDED HISTORY: concern for ectopic vs intrauterine pregnancy TECHNOLOGIST PROVIDED HISTORY: concern for ectopic vs intrauterine pregnancy FINDINGS: Uterus: 8.9 x 5.6 x 4.2 cm, heterogeneous endometrium measures 1.5 cm in thickness. Gestational Sac(s):  No intrauterine gestational sac is identified. Yolk Sac:  Not identified Fetal Pole:  Not identified Crown Rump Length:  Not measured Fetal Heart Rate:  Not identified Right ovary:  The right ovary measures 3.9 x 2.7 x 2.4 cm and is physiologic in appearance with normal arterial and venous Doppler blood flow. Left ovary: Left ovary measures 3.6 x 2.7 x 1.7 cm and is physiologic in appearance with normal arterial and venous Doppler blood flow. Free fluid: None. No intrauterine gestational sac is identified. Findings could relate to very early pregnancy. Ectopic pregnancy is not excluded. Consider close clinical follow-up with serial beta hCG and short-term follow-up pelvic ultrasound as warranted. PROCEDURES:  None    CONSULTS:  None    CRITICAL CARE:  Please see attending note    FINAL IMPRESSION      1.  Vaginal bleeding in pregnancy          DISPOSITION / PLAN     DISPOSITION Decision To Discharge 09/30/2020 11:51:14 PM      PATIENT REFERRED TO:  [facility]  [address] [place] [address]  [phone]  In 2 days        DISCHARGE MEDICATIONS:  Discharge Medication List as of 9/30/2020 11:52 PM          Atiya Burris MD  Emergency Medicine Resident    (Please note that portions of this note were completed with a voice recognition program.Efforts were made to edit the dictations but occasionally words are mis-transcribed.)       Atiya Burris MD  Resident  10/01/20 [id number]

## 2020-10-01 NOTE — ED NOTES
Pt presents to ED w/ c/o vaginal bleeding, onset last Thursday (8/24). Pt also reports low abdominal cramping rated 4/10, reports relief with Tylenol. Pt states she is using tampons, using 2-3 regular tampons per day, states flow is \"light but steady. \" Pt is in NAD, VSS. Will continue to monitor.      Matt Mathew RN  09/30/20 0799

## 2020-10-01 NOTE — PROGRESS NOTES
Jesus Agosto  10/1/2020    YOB: 2000          The patient was seen today. She is here regarding ER follow up for missed menses. + Quant of 401 20. Vaginal bleeding for the past week. Treated in ER for BV and UTI. U/s on 2020 indicated short term follow up d/t no IUP noted. U/s concerned for early IUP vs ectopic. LMP 3+ months ago. Menses every 1-6 months, lasting 5 days, light to heavy bleeding. Not currently on PNVs. Patient is Rh negative- received rhogam in ER. Her bowels are regular and she is voiding without difficulty. HPI:  Jesus Agosto is a 21 y.o. female  vaginal bleeding, + quant      OB History    Para Term  AB Living   1 0 0 0 0 0   SAB TAB Ectopic Molar Multiple Live Births   0 0 0 0 0 0      # Outcome Date GA Lbr John/2nd Weight Sex Delivery Anes PTL Lv   1 Current                History reviewed. No pertinent past medical history. Past Surgical History:   Procedure Laterality Date    WISDOM TOOTH EXTRACTION         No family history on file.     Social History     Socioeconomic History    Marital status: Single     Spouse name: Not on file    Number of children: Not on file    Years of education: Not on file    Highest education level: Not on file   Occupational History    Not on file   Social Needs    Financial resource strain: Not on file    Food insecurity     Worry: Not on file     Inability: Not on file    Transportation needs     Medical: Not on file     Non-medical: Not on file   Tobacco Use    Smoking status: Never Smoker    Smokeless tobacco: Never Used   Substance and Sexual Activity    Alcohol use: Not Currently    Drug use: Never    Sexual activity: Yes     Partners: Male   Lifestyle    Physical activity     Days per week: Not on file     Minutes per session: Not on file    Stress: Not on file   Relationships    Social connections     Talks on phone: Not on file     Gets together: Not on file     Attends Adventism service: Not on file     Active member of club or organization: Not on file     Attends meetings of clubs or organizations: Not on file     Relationship status: Not on file    Intimate partner violence     Fear of current or ex partner: Not on file     Emotionally abused: Not on file     Physically abused: Not on file     Forced sexual activity: Not on file   Other Topics Concern    Not on file   Social History Narrative    Not on file         MEDICATIONS:  Current Outpatient Medications   Medication Sig Dispense Refill    Prenatal Vit-Fe Fumarate-FA (PRENATAL VITAMINS) 28-0.8 MG TABS Take 1 tablet by mouth daily 30 tablet 12    cephALEXin (KEFLEX) 500 MG capsule Take 1 capsule by mouth 2 times daily for 10 days 20 capsule 0    acetaminophen (TYLENOL) 325 MG tablet Take 2 tablets by mouth every 6 hours as needed for Pain 20 tablet 0    metroNIDAZOLE (FLAGYL) 500 MG tablet Take 1 tablet by mouth 2 times daily for 10 days 20 tablet 0     No current facility-administered medications for this visit. ALLERGIES:  Allergies as of 10/01/2020    (No Known Allergies)         REVIEW OF SYSTEMS:    yes   A minimum of an eleven point review of systems was completed. Review Of Systems (11 point):  Constitutional: No fever, chills or malaise; No weight change or fatigue  Head and Eyes: No vision, Headache, Dizziness or trauma in last 12 months  ENT ROS: No hearing, Tinnitis, sinus or taste problems  Hematological and Lymphatic ROS:No Lymphoma, Von Willebrand's, Hemophillia or Bleeding History  Psych ROS: No Depression, Homicidal thoughts,suicidal thoughts, or anxiety  Breast ROS: No prior breast abnormalities or lumps  Respiratory ROS: No SOB, Pneumoniae,Cough, or Pulmonary Embolism History  Cardiovascular ROS: No Chest Pain with Exertion, Palpitations, Syncope, Edema, Arrhythmia  Gastrointestinal ROS: No Indigestion, Heartburn, Nausea, vomiting, Diarrhea, Constipation,or Bowel Changes;  No Bloody Stools or Transvaginal    Result Date: 9/24/2020  EXAMINATION: FIRST TRIMESTER OBSTETRIC ULTRASOUND 9/24/2020 TECHNIQUE: Transvaginal first trimester obstetric pelvic ultrasound was performed with color Doppler flow evaluation. COMPARISON: None HISTORY: ORDERING SYSTEM PROVIDED HISTORY: + pregnancy, vaginal bleeding, abd cramping TECHNOLOGIST PROVIDED HISTORY: + pregnancy, vaginal bleeding, abd cramping FINDINGS: Uterus: 8.1 x 5.4 x 4.1 cm Gestational Sac(s):  Not visualized Yolk Sac:  Not visualized Fetal Pole:  Not visualized Crown Rump Length:  Not visualized Fetal Heart Rate:  Not visualized Right ovary: 3.6 x 3.4 x 2.6 cm. Arterial and venous flow identified to the right ovary. Left ovary: 2.2 x 3.5 x 2.3 cm. Arterial and venous flow identified to the left ovary. Free fluid: None     No intrauterine pregnancy identified. No gestational sac or yolk sac. Arterial and venous spectral flow identified to the bilateral ovaries. Lab Results:  Results for orders placed or performed during the hospital encounter of 09/30/20   HCG, QUANTITATIVE, PREGNANCY   Result Value Ref Range    hCG Quant 401 (H) <5 IU/L   RHOGAM INJECTION ONLY   Result Value Ref Range    Unit Number [id number]     Product Code RHIG     Unit Divison 00     Dispense Status REL FROM [facility]     Transfusion Status OK TO TRANSFUSE          Assessment:   Diagnosis Orders   1. Missed period  HCG, Quantitative, Pregnancy    US OB LESS THAN 14 WEEKS SINGLE OR FIRST GESTATION    US OB TRANSVAGINAL     There are no active problems to display for this patient. PLAN:  Return in about 2 weeks (around 10/15/2020) for for missed menses . Lab slip given  Pelvic u/s scheduled at the hospital for 10/12/2020  Rx PNVs  Repeat Annual every 1 year  Cervical Cytology Evaluation begins at 24years old. If Negative Cytology, Follow-up screening per current guidelines. Return to the office in 2 weeks. Counseled on preventative health maintenance follow-up.   Orders Placed This Encounter   Procedures    US OB LESS THAN 14 WEEKS SINGLE OR FIRST GESTATION     Standing Status:   Future     Standing Expiration Date:   10/1/2021     Order Specific Question:   Reason for exam:     Answer:   early IUP vs ectopic    US OB TRANSVAGINAL     Standing Status:   Future     Standing Expiration Date:   10/1/2021     Order Specific Question:   Reason for exam:     Answer:   early IUP vs ectopic    HCG, Quantitative, Pregnancy     Standing Status:   Future     Standing Expiration Date:   10/1/2021       Patient was seen with total face to face time of 15 minutes. More than 50% of this visit was counseling and education regarding The encounter diagnosis was Missed period. and Established New Doctor and Menstrual Problem   as well as  counseling on preventative health maintenance follow-up.

## 2020-10-03 ENCOUNTER — HOSPITAL ENCOUNTER (OUTPATIENT)
Age: 20
Discharge: HOME | End: 2020-10-03
Payer: COMMERCIAL

## 2020-10-03 LAB — HCG QUANTITATIVE: 1214 IU/L

## 2020-10-03 PROCEDURE — 84702 CHORIONIC GONADOTROPIN TEST: CPT

## 2020-10-03 PROCEDURE — 36415 COLL VENOUS BLD VENIPUNCTURE: CPT

## 2020-10-05 ENCOUNTER — TELEPHONE (OUTPATIENT)
Dept: OBGYN CLINIC | Age: 20
End: 2020-10-05

## 2020-10-05 NOTE — TELEPHONE ENCOUNTER
----- Message from GORDON Hodgson NP sent at 10/5/2020  7:58 AM EDT -----  Jenni Summers increasing   Repeat quant in 1 week for trending  Determine LMP  Order PNVs  Patient will need scheduled for NOB

## 2020-10-05 NOTE — TELEPHONE ENCOUNTER
----- Message from GORDON Muñiz - NP sent at 10/5/2020  7:58 AM EDT -----  Vipul Mendez increasing   Repeat quant in 1 week for trending  Determine LMP  Order PNVs  Patient will need scheduled for NOB

## 2020-10-05 NOTE — TELEPHONE ENCOUNTER
Per Dominique Torres CNP, patient advised of increasing quant hcg levels. Patient advised, per provider, to repeat quant in 1 week. Patient currently taking pnv. Patient agreeable to repeat lab in 1 week. Will schedule at a later time, per patient. Quant ordered accordingly.

## 2020-10-10 ENCOUNTER — HOSPITAL ENCOUNTER (OUTPATIENT)
Age: 20
Discharge: HOME | End: 2020-10-10
Payer: COMMERCIAL

## 2020-10-10 LAB — HCG QUANTITATIVE: 6445 IU/L

## 2020-10-10 PROCEDURE — 36415 COLL VENOUS BLD VENIPUNCTURE: CPT

## 2020-10-10 PROCEDURE — 84702 CHORIONIC GONADOTROPIN TEST: CPT

## 2020-10-12 ENCOUNTER — TELEPHONE (OUTPATIENT)
Dept: OBGYN CLINIC | Age: 20
End: 2020-10-12

## 2020-10-12 NOTE — TELEPHONE ENCOUNTER
----- Message from GORDON Mac CNP sent at 10/12/2020  8:47 AM EDT -----  Needs new OB intake and OB ultrasound scheduled  Prenatal vitamin 1 PO QD with 12 refills.

## 2020-10-14 ENCOUNTER — HOSPITAL ENCOUNTER (OUTPATIENT)
Dept: ULTRASOUND IMAGING | Age: 20
Discharge: HOME | End: 2020-10-16
Payer: COMMERCIAL

## 2020-10-14 ENCOUNTER — TELEPHONE (OUTPATIENT)
Dept: OBGYN CLINIC | Age: 20
End: 2020-10-14

## 2020-10-14 PROCEDURE — 76801 OB US < 14 WKS SINGLE FETUS: CPT

## 2020-10-14 NOTE — TELEPHONE ENCOUNTER
----- Message from GORDON Irene CNP sent at 10/14/2020 12:07 PM EDT -----  SLIUP at 5 weeks and 5 days. Questionable small area- left sided subchorionic hemorrhage. Please make sure patient has a new OB intake scheduled. Prenatal vitamin 1 PO QD with 12 refills.   Please instruct patient on possible subchorionic hemorrhage - pelvic rest and no heavy lifting till next ultrasound

## 2020-11-02 RX ORDER — PNV NO.95/FERROUS FUM/FOLIC AC 28MG-0.8MG
1 TABLET ORAL DAILY
Qty: 30 TABLET | Refills: 12 | Status: SHIPPED | OUTPATIENT
Start: 2020-11-02 | End: 2020-12-07 | Stop reason: SDUPTHER

## 2020-11-03 ENCOUNTER — TELEPHONE (OUTPATIENT)
Dept: OBGYN CLINIC | Age: 20
End: 2020-11-03

## 2020-11-03 ENCOUNTER — HOSPITAL ENCOUNTER (OUTPATIENT)
Age: 20
Discharge: HOME | End: 2020-11-03
Payer: COMMERCIAL

## 2020-11-03 LAB — HCG QUANTITATIVE: ABNORMAL IU/L

## 2020-11-03 PROCEDURE — 36415 COLL VENOUS BLD VENIPUNCTURE: CPT

## 2020-11-03 PROCEDURE — 84702 CHORIONIC GONADOTROPIN TEST: CPT

## 2020-11-03 NOTE — TELEPHONE ENCOUNTER
Patient called office reporting vaginal bleeding in early pregnancy. Patient stated that she awoke this morning and noticed dark red blood collecting in her underwear. Patient went to the bathroom, in which she noticed additional bleeding. At time of call, approximately 0930, patient had stated bleeding had stopped and now only vaginal spotting was occurring, scant in amount. Patient previously diagnosed with subchorionic hematoma on ultrasound. Patient voiced she has been maintaining pelvic rest and no heavy lifting. Patient's s/s presented to Dr. Alexandra Hidalgo. Per Dr. Alexandra Hidalgo, patient is to have repeat quantitative today, with ultrasound today or tomorrow. Call sent to front office with scheduling instructions for today/tomorrow. Patient agreeable to completing blood work today, active order placed. Patient advised to seek ER evaluation with heavy vaginal bleeding, pelvic pain, or fever. Patient agreeable.

## 2020-11-04 ENCOUNTER — INITIAL PRENATAL (OUTPATIENT)
Dept: OBGYN CLINIC | Age: 20
End: 2020-11-04

## 2020-11-04 ENCOUNTER — OFFICE VISIT (OUTPATIENT)
Dept: OBGYN CLINIC | Age: 20
End: 2020-11-04
Payer: COMMERCIAL

## 2020-11-04 VITALS
BODY MASS INDEX: 33.89 KG/M2 | TEMPERATURE: 98.1 F | SYSTOLIC BLOOD PRESSURE: 110 MMHG | DIASTOLIC BLOOD PRESSURE: 60 MMHG | WEIGHT: 210 LBS | HEART RATE: 80 BPM

## 2020-11-04 PROBLEM — O41.8X10 SUBCHORIONIC HEMATOMA IN FIRST TRIMESTER: Status: ACTIVE | Noted: 2020-11-04

## 2020-11-04 PROBLEM — Z34.00 PRIMIGRAVIDA, ANTEPARTUM: Status: ACTIVE | Noted: 2020-11-04

## 2020-11-04 PROBLEM — Z82.0 FAMILY HISTORY OF MS (MULTIPLE SCLEROSIS): Status: ACTIVE | Noted: 2020-11-04

## 2020-11-04 PROBLEM — Z86.19 HISTORY OF CHLAMYDIA: Status: ACTIVE | Noted: 2020-11-04

## 2020-11-04 PROBLEM — O46.90 VAGINAL BLEEDING IN PREGNANCY: Status: ACTIVE | Noted: 2020-11-04

## 2020-11-04 PROBLEM — Z29.13 NEED FOR RHOGAM DUE TO RH NEGATIVE MOTHER: Status: ACTIVE | Noted: 2020-11-04

## 2020-11-04 PROBLEM — O46.8X1 SUBCHORIONIC HEMATOMA IN FIRST TRIMESTER: Status: ACTIVE | Noted: 2020-11-04

## 2020-11-04 LAB
CRL: NORMAL
SAC DIAMETER: NORMAL

## 2020-11-04 PROCEDURE — 0500F INITIAL PRENATAL CARE VISIT: CPT | Performed by: NURSE PRACTITIONER

## 2020-11-04 PROCEDURE — 76801 OB US < 14 WKS SINGLE FETUS: CPT | Performed by: OBSTETRICS & GYNECOLOGY

## 2020-11-04 NOTE — PROGRESS NOTES
Patient presents to office for new ob intake, nurse collection only. Patient had ultrasound in office today, to confirm pregnancy viability/assess subchorionic hematoma. See imaging tab for details. Based on sono completed on 10/14/20, patient is 8w5d gestation, TATUM 6/11/21. This is patient's first pregnancy, current taking prenatal vitamins. Patient's medical, family, obstetrical, and surgical history reviewed. See EHR for details. Patient states she went to the ER on 9/30/20 for vaginal bleeding in pregnancy, rho devyn administered for rh - status. Problem list updated accordingly. New ob consent forms signed. Patient accepting of first trimester screening/cystic fibrosis screening. Orders placed/cosigned per CNP. Referral to MFM placed per front office staff. First trimester lab set ordered. Patient encouraged to complete prior to follow up prenatal visit, which was scheduled upon check out. Patient encouraged to call office with questions or concerns.

## 2020-11-17 ENCOUNTER — HOSPITAL ENCOUNTER (OUTPATIENT)
Age: 20
Discharge: HOME | End: 2020-11-17
Payer: COMMERCIAL

## 2020-11-17 LAB
-: NORMAL
ABO/RH: NORMAL
ABSOLUTE EOS #: 0.05 K/UL (ref 0–0.44)
ABSOLUTE IMMATURE GRANULOCYTE: 0.04 K/UL (ref 0–0.3)
ABSOLUTE LYMPH #: 2.6 K/UL (ref 1.2–5.2)
ABSOLUTE MONO #: 0.53 K/UL (ref 0.1–1.4)
AMORPHOUS: NORMAL
ANTIBODY IDENTIFICATION: NORMAL
ANTIBODY SCREEN: POSITIVE
BACTERIA: NORMAL
BASOPHILS # BLD: 0 % (ref 0–2)
BASOPHILS ABSOLUTE: 0.03 K/UL (ref 0–0.2)
BILIRUBIN URINE: NEGATIVE
CASTS UA: NORMAL /LPF (ref 0–8)
COLOR: YELLOW
COMMENT UA: ABNORMAL
CRYSTALS, UA: NORMAL /HPF
DIFFERENTIAL TYPE: ABNORMAL
EOSINOPHILS RELATIVE PERCENT: 1 % (ref 1–4)
EPITHELIAL CELLS UA: NORMAL /HPF (ref 0–5)
GLUCOSE BLD-MCNC: 89 MG/DL (ref 70–99)
GLUCOSE URINE: NEGATIVE
HCT VFR BLD CALC: 39.5 % (ref 36.3–47.1)
HEMOGLOBIN: 13.5 G/DL (ref 11.9–15.1)
HEPATITIS B SURFACE ANTIGEN: NONREACTIVE
HIV AG/AB: NONREACTIVE
IMMATURE GRANULOCYTES: 0 %
KETONES, URINE: NEGATIVE
LEUKOCYTE ESTERASE, URINE: ABNORMAL
LYMPHOCYTES # BLD: 24 % (ref 25–45)
MCH RBC QN AUTO: 29.5 PG (ref 25.2–33.5)
MCHC RBC AUTO-ENTMCNC: 34.2 G/DL (ref 28.4–34.8)
MCV RBC AUTO: 86.2 FL (ref 82.6–102.9)
MONOCYTES # BLD: 5 % (ref 2–8)
MUCUS: NORMAL
NITRITE, URINE: NEGATIVE
NRBC AUTOMATED: 0 PER 100 WBC
OTHER OBSERVATIONS UA: NORMAL
PDW BLD-RTO: 12.7 % (ref 11.8–14.4)
PH UA: 5.5 (ref 5–8)
PLATELET # BLD: 206 K/UL (ref 138–453)
PLATELET ESTIMATE: ABNORMAL
PMV BLD AUTO: 11.7 FL (ref 8.1–13.5)
PROTEIN UA: NEGATIVE
RBC # BLD: 4.58 M/UL (ref 3.95–5.11)
RBC # BLD: ABNORMAL 10*6/UL
RBC UA: NORMAL /HPF (ref 0–4)
RENAL EPITHELIAL, UA: NORMAL /HPF
RUBV IGG SER QL: 269.3 IU/ML
SEG NEUTROPHILS: 70 % (ref 34–64)
SEGMENTED NEUTROPHILS ABSOLUTE COUNT: 7.57 K/UL (ref 1.8–8)
SPECIFIC GRAVITY UA: 1.02 (ref 1–1.03)
T. PALLIDUM, IGG: NONREACTIVE
TOXOPLASM IGM: 0.3 INDEX
TOXOPLASMA BLOOD FOR RATIO: <0.5 IU/ML
TRICHOMONAS: NORMAL
TSH SERPL DL<=0.05 MIU/L-ACNC: 1.34 MIU/L (ref 0.3–5)
TURBIDITY: CLEAR
URINE HGB: NEGATIVE
UROBILINOGEN, URINE: NORMAL
WBC # BLD: 10.8 K/UL (ref 4.5–13.5)
WBC # BLD: ABNORMAL 10*3/UL
WBC UA: NORMAL /HPF (ref 0–5)
YEAST: NORMAL

## 2020-11-17 PROCEDURE — 36415 COLL VENOUS BLD VENIPUNCTURE: CPT

## 2020-11-17 PROCEDURE — 86778 TOXOPLASMA ANTIBODY IGM: CPT

## 2020-11-17 PROCEDURE — 81220 CFTR GENE COM VARIANTS: CPT

## 2020-11-17 PROCEDURE — 86901 BLOOD TYPING SEROLOGIC RH(D): CPT

## 2020-11-17 PROCEDURE — 86870 RBC ANTIBODY IDENTIFICATION: CPT

## 2020-11-17 PROCEDURE — 86780 TREPONEMA PALLIDUM: CPT

## 2020-11-17 PROCEDURE — 86777 TOXOPLASMA ANTIBODY: CPT

## 2020-11-17 PROCEDURE — 86900 BLOOD TYPING SEROLOGIC ABO: CPT

## 2020-11-17 PROCEDURE — 81001 URINALYSIS AUTO W/SCOPE: CPT

## 2020-11-17 PROCEDURE — 87389 HIV-1 AG W/HIV-1&-2 AB AG IA: CPT

## 2020-11-17 PROCEDURE — 86762 RUBELLA ANTIBODY: CPT

## 2020-11-17 PROCEDURE — 85025 COMPLETE CBC W/AUTO DIFF WBC: CPT

## 2020-11-17 PROCEDURE — 84443 ASSAY THYROID STIM HORMONE: CPT

## 2020-11-17 PROCEDURE — 82947 ASSAY GLUCOSE BLOOD QUANT: CPT

## 2020-11-17 PROCEDURE — 86850 RBC ANTIBODY SCREEN: CPT

## 2020-11-17 PROCEDURE — 87340 HEPATITIS B SURFACE AG IA: CPT

## 2020-11-17 PROCEDURE — 87086 URINE CULTURE/COLONY COUNT: CPT

## 2020-11-18 PROBLEM — O26.899 RH NEGATIVE STATE IN ANTEPARTUM PERIOD: Status: ACTIVE | Noted: 2020-11-18

## 2020-11-18 PROBLEM — Z67.91 RH NEGATIVE STATE IN ANTEPARTUM PERIOD: Status: ACTIVE | Noted: 2020-11-18

## 2020-11-18 LAB
CULTURE: NORMAL
CYSTIC FIBROSIS: NORMAL
Lab: NORMAL
SPECIMEN DESCRIPTION: NORMAL

## 2020-11-19 ENCOUNTER — ROUTINE PRENATAL (OUTPATIENT)
Dept: OBGYN CLINIC | Age: 20
End: 2020-11-19

## 2020-11-19 ENCOUNTER — HOSPITAL ENCOUNTER (OUTPATIENT)
Age: 20
Setting detail: SPECIMEN
Discharge: HOME | End: 2020-11-19
Payer: COMMERCIAL

## 2020-11-19 VITALS
SYSTOLIC BLOOD PRESSURE: 116 MMHG | DIASTOLIC BLOOD PRESSURE: 74 MMHG | BODY MASS INDEX: 34.06 KG/M2 | WEIGHT: 211 LBS | HEART RATE: 78 BPM

## 2020-11-19 PROBLEM — N75.0 CYST OF BARTHOLIN'S GLAND: Status: ACTIVE | Noted: 2020-11-19

## 2020-11-19 PROCEDURE — 87591 N.GONORRHOEAE DNA AMP PROB: CPT

## 2020-11-19 PROCEDURE — 87070 CULTURE OTHR SPECIMN AEROBIC: CPT

## 2020-11-19 PROCEDURE — 87491 CHLMYD TRACH DNA AMP PROBE: CPT

## 2020-11-19 PROCEDURE — 0500F INITIAL PRENATAL CARE VISIT: CPT | Performed by: NURSE PRACTITIONER

## 2020-11-19 PROCEDURE — 86403 PARTICLE AGGLUT ANTBDY SCRN: CPT

## 2020-11-19 RX ORDER — CEPHALEXIN 500 MG/1
500 CAPSULE ORAL 4 TIMES DAILY
Qty: 40 CAPSULE | Refills: 0 | Status: SHIPPED | OUTPATIENT
Start: 2020-11-19 | End: 2020-11-29

## 2020-11-19 RX ORDER — CLINDAMYCIN HYDROCHLORIDE 300 MG/1
300 CAPSULE ORAL 3 TIMES DAILY
Qty: 30 CAPSULE | Refills: 0 | Status: SHIPPED | OUTPATIENT
Start: 2020-11-19 | End: 2020-11-29

## 2020-11-19 NOTE — PROGRESS NOTES
Bronwyn Palma  2020    YOB: 2020   No LMP recorded (lmp unknown). Patient is pregnant. 10w6d        Primary Care Physician: GORDON Hurd CNP        CC: Initial Prenatal Visit  Right Vulvar mass    Subjective:     Bronwyn Palma is a 21 y.o. female     is being seen today for her first obstetrical visit. This is not a planned pregnancy. She is at [address]  Her obstetrical history is significant for primigravida, subchroionic hematoma, Rh negative, enlarged right atypical bartholin gland   Relationship with FOB: significant other, living together. Patient does intend to breast feed. Pregnancy history fully reviewed. Mother's ethnicity:   Father's ethnicity:       Objective: There were no vitals taken for this visit.     OB History    Para Term  AB Living   1 0 0 0 0 0   SAB TAB Ectopic Molar Multiple Live Births   0 0 0 0 0 0      # Outcome Date GA Lbr John/2nd Weight Sex Delivery Anes PTL Lv   1 Current                Past Medical History:   Diagnosis Date    No pertinent past medical history      Past Surgical History:   Procedure Laterality Date    WISDOM TOOTH EXTRACTION        Social History     Socioeconomic History    Marital status: Single     Spouse name: Not on file    Number of children: Not on file    Years of education: Not on file    Highest education level: Not on file   Occupational History    Not on file   Social Needs    Financial resource strain: Not on file    Food insecurity     Worry: Not on file     Inability: Not on file    Transportation needs     Medical: Not on file     Non-medical: Not on file   Tobacco Use    Smoking status: Never Smoker    Smokeless tobacco: Never Used   Substance and Sexual Activity    Alcohol use: Not Currently    Drug use: Never    Sexual activity: Yes     Partners: Male   Lifestyle    Physical activity     Days per week: Not on file     Minutes per session: Not on file    Stress: Not on file   Relationships    Social connections     Talks on phone: Not on file     Gets together: Not on file     Attends [employer] service: Not on file     Active member of club or organization: Not on file     Attends meetings of clubs or organizations: Not on file     Relationship status: Not on file    Intimate partner violence     Fear of current or ex partner: Not on file     Emotionally abused: Not on file     Physically abused: Not on file     Forced sexual activity: Not on file   Other Topics Concern    Not on file   Social History Narrative    Not on file     Family History   Problem Relation Age of Onset    Diabetes Maternal Grandmother        MEDICATIONS:  Current Outpatient Medications   Medication Sig Dispense Refill    Prenatal Vit-Fe Fumarate-FA (PRENATAL VITAMINS) 28-0.8 MG TABS Take 1 tablet by mouth daily 30 tablet 12    acetaminophen (TYLENOL) 325 MG tablet Take 2 tablets by mouth every 6 hours as needed for Pain 20 tablet 0     No current facility-administered medications for this visit. ALLERGIES:  Allergies as of 11/19/2020    (No Known Allergies)           Physical Exam Completed-See Epic Navigator    Chaperone for Intimate Exam   Chaperone was offered and accepted as part of the rooming process.  Chaperone: Dayami MAGANA    Pelvic Exam: Right labial mass noted- atypical bartholin gland. 5 cm by 3 cm. Moveable. Nontender. Edematous. Dr Hartley Precise in to assess mass. Plan for abx with possible drainage in 2 weeks. Patient states mass has been present for 2 + years. Assessment:      Pregnancy at 10 and 6/7 weeks    Diagnosis Orders   1.  Rh negative state in antepartum period             Patient Active Problem List    Diagnosis Date Noted    Rh negative state in antepartum period 11/18/2020     Needs rhogam at 28 weeks      Primigravida, antepartum 11/04/2020    Vaginal bleeding in pregnancy 11/04/2020 11/4/2020 - Rhogam given 20 in the ER for vaginal bleeding in early pregnancy.  Subchorionic hematoma in first trimester 2020 - No heavy lifting and pelvic rest initiated per Siddharth Harm CNP      Need for rhogam due to Rh negative mother 2020 - Early rhogam given for vaginal bleeding, last administered 20 in the ER      Father of Baby - Family history of MS (multiple sclerosis) in mother and sister 2020    History of chlamydia 2020 - Patient tested positive for chlamydia on 30 within the ER setting. Plan:      Initial labs drawn. Prenatal vitamins. Vaginal cultures collected   Pelvic u/s ordered to assess vulvar mass   Rx Abx   To follow up in 2 weeks with Dr Millie Torres for possible drainage   Problem list reviewed and updated. NT Screen/Quad Testing and MSAFP Single Marker: requested  Role of ultrasound in pregnancy discussed; fetal survey: requests  Amniocentesis discussed: Not indicated  NIPT Testing not indicated  Cultures & Wet Prep Collected  Follow-up in 4 weeks  Repeat Annual every 1 year  Cervical Cytology Evaluation begins at 24years old. If Negative Cytology, Follow-up screening per current guidelines. M appointment scheduled      COUNSELING COMPLETED:  INITIAL OBSTETRICAL VISIT EVALUATION:  The patient was seen full history and physical was completed/reviewed. Cytology was collected for patients over 24years of age. Cultures were collected. The patient was counseled on office policies and she was counseled on termination of pregnancy in the state of [place]. The patient was counseled on Toxoplasmosis, HIV, Tobacco Abuse, Group Beta Strep Infections, Cystic Fibrosis,  Labor precautions and Sickle Cell disease. The patient was counseled on the risks of tobacco abuse.  Both maternal and fetal. She was instructed to stop smoking if currently using tobacco. Morbidity, mortality, and cessation programs were reviewed. The risks include but are not limited to increased risks of  labor,  delivery, premature rupture of membranes, intrauterine growth restriction, intrauterine fetal demise and abruptio placenta. Secondary smoke risks were also reviewed. Increases in cancer, respiratory problems, and sudden infant death syndrome were reviewed as well. The patient was informed of a 2-4% risk of congenital anomalies in the general population. She was also informed that karyotyping is the only way to evaluate the fetus for genetic problems and genetic lethal anomalies. Chorionic villous sampling, amniocentesis and NIPT testing were also discussed with morbidity rates in detail. She requested the procedure. Route of delivery and counseling on vaginal, operative vaginal, and  sections were completed with the risks of each to both the patient as well as her baby. The possibility of a blood transfusion was discussed as well. The patient was not opposed to receiving a transfusion if needed. Nuchal translucency/Quad Evaluation and MSAFP single marker testing was reviewed in detail with attention to timing of testing and their windows. For patients beyond the gestational age for Nuchal translucency evaluation Quad testing was recommended. Timing for the Quad test was reviewed. Benefits of the above testing was reviewed. A second trimester amniocentesis was also made available to the patient. Risks, Benefits and non-invasive alternative testing was reviewed. The literature regarding a questionable link to pitocin augmentation and induction of labor, the assistance of labor contractions and the initiation of contractions to help delivery, have been reviewed with the patient regarding the increased potential of having a  with Attention Deficit Hyperactivity Disorder and or Autism.  These two disorders and the ramifications of their impact on a child and the family caring for that child has been

## 2020-11-20 LAB
C TRACH DNA GENITAL QL NAA+PROBE: NEGATIVE
N. GONORRHOEAE DNA: NEGATIVE

## 2020-11-22 LAB
CULTURE: ABNORMAL
Lab: ABNORMAL
SPECIMEN DESCRIPTION: ABNORMAL

## 2020-11-23 PROBLEM — O99.820 GBS (GROUP B STREPTOCOCCUS CARRIER), +RV CULTURE, CURRENTLY PREGNANT: Status: ACTIVE | Noted: 2020-11-23

## 2020-11-27 ENCOUNTER — HOSPITAL ENCOUNTER (OUTPATIENT)
Dept: ULTRASOUND IMAGING | Age: 20
Discharge: HOME | End: 2020-11-29
Payer: COMMERCIAL

## 2020-11-27 PROCEDURE — 76857 US EXAM PELVIC LIMITED: CPT

## 2020-12-01 ENCOUNTER — ROUTINE PRENATAL (OUTPATIENT)
Dept: PERINATAL CARE | Age: 20
End: 2020-12-01
Payer: COMMERCIAL

## 2020-12-01 VITALS
HEART RATE: 70 BPM | DIASTOLIC BLOOD PRESSURE: 70 MMHG | WEIGHT: 212 LBS | BODY MASS INDEX: 34.22 KG/M2 | SYSTOLIC BLOOD PRESSURE: 121 MMHG

## 2020-12-01 PROCEDURE — 76801 OB US < 14 WKS SINGLE FETUS: CPT | Performed by: OBSTETRICS & GYNECOLOGY

## 2020-12-01 PROCEDURE — 76813 OB US NUCHAL MEAS 1 GEST: CPT | Performed by: OBSTETRICS & GYNECOLOGY

## 2020-12-03 ENCOUNTER — ROUTINE PRENATAL (OUTPATIENT)
Dept: OBGYN CLINIC | Age: 20
End: 2020-12-03

## 2020-12-03 VITALS
SYSTOLIC BLOOD PRESSURE: 112 MMHG | WEIGHT: 213 LBS | BODY MASS INDEX: 34.38 KG/M2 | HEART RATE: 70 BPM | DIASTOLIC BLOOD PRESSURE: 82 MMHG

## 2020-12-03 PROBLEM — R89.9 ABNORMAL LABORATORY TEST: Status: ACTIVE | Noted: 2020-12-03

## 2020-12-03 PROBLEM — D17.30 LIPOMA OF SKIN AND SUBCUTANEOUS TISSUE: Status: ACTIVE | Noted: 2020-12-03

## 2020-12-03 PROCEDURE — 0502F SUBSEQUENT PRENATAL CARE: CPT | Performed by: OBSTETRICS & GYNECOLOGY

## 2020-12-03 NOTE — PROGRESS NOTES
Christopher Rosas is a 21 y.o. female 14w9d        OB History    Para Term  AB Living   1             SAB TAB Ectopic Molar Multiple Live Births                    # Outcome Date GA Lbr John/2nd Weight Sex Delivery Anes PTL Lv   1 Current                      Vitals  BP: 112/82  Weight: 213 lb (96.6 kg)  Pulse: 70  Patient Position: Sitting  Albumin: Negative  Glucose: Negative  Fetal Heart Rate: 158      The patient was seen and evaluated. There was Negative fetal movements. No contractions or leakage of fluid. Signs and symptoms of  labor as well as labor were reviewed. The Nuchal Translucency testing is pending but completed. A single marker MSAFP was ordered for a 15-20 week gestational age window. TOP ST OH Reviewed. Dates were reviewed with the patient. An 18-22 week anatomy ultrasound has been ordered. The patient will return to the office for her next visit in 2-4 weeks. See antepartum flow sheet. She had what appeared to be an infected right labial region at her last visit. She was treated with ABX's . She denies worsening and states it now appears to be the same as it always has been for the last two years. Sonogram is suspicious for Lipoma of right Labia  7.7 cm. No Patient Care Coordination Note on file. Not red. No erythema or tenderness  Suspicious for lipoma  Non fluctuant  No inguinal adenopathy      Assessment:  1. Christopher Rosas is a 21 y.o. female  2.    3. 12w6d    Patient Active Problem List    Diagnosis Date Noted    Lipoma of Vulva/Right Labia 2020     Priority: High     GYN ONC Referral      ASA 81 mg/dy per MFM 2020     Priority: High    GBS (group B Streptococcus carrier), +RV culture, currently pregnant 2020     Priority: High     2020 + GBS- treat in labor per protocol      Rh negative state in antepartum period 2020     Priority: High     Needs rhogam at 28 weeks      Need for rhogam due to Rh negative mother 2020     Priority: High     20 - Early rhogam given for vaginal bleeding, last administered 20 in the ER  RHOGAM Dosin2020 and 3/15/2020 and 2020      Vaginal bleeding in pregnancy 2020     Priority: Medium     2020 - Rhogam given 20 in the ER for vaginal bleeding in early pregnancy.  Subchorionic hematoma in first trimester 2020     Priority: Medium     2020 - No heavy lifting and pelvic rest initiated per Fuad Tierney CNP      History of chlamydia 2020     Priority: Medium     20 - Patient tested positive for chlamydia on 20 within the ER setting. CORONA completed  Partner tx'd  Barrier recs  STD counseling done    36 weeks : GC Chlamydia HIV and Tpall      Primigravida, antepartum 2020    Father of Baby - Family history of MS (multiple sclerosis) in mother and sister 2020        Diagnosis Orders   1. HRP (high risk pregnancy), first trimester  Alpha Fetoprotein, Maternal   2. 12 weeks gestation of pregnancy  Alpha Fetoprotein, Maternal   3. GBS (group B Streptococcus carrier), +RV culture, currently pregnant     4. Rh negative state in antepartum period     5. History of chlamydia     6. Need for rhogam due to Rh negative mother  rho,D, immune globulin (HYPERRHO S/D) 1500 units SOSY injection   7. Subchorionic hematoma in first trimester, single or unspecified fetus     8. Lipoma of Vulva/Right Labia  MRI PELVIS WO CONTRAST   9. ASA 81 mg/dy per [facility]           Plan:  1. MSAFP  2. FU per [facility] recs  3. MRI without contrast of pelvis-report any redness or pain or temp  4. RTO 2-4 weeks  5. [facility] anatomy sono  6. I discussed excision. Pt is wishing to have it removed after her delivery  7. GYN Onc evaluation  8. NT screen completed and pending  9.  ASA 81 mg/dy per [facility]    Orders Placed This Encounter   Procedures    MRI PELVIS WO CONTRAST     Standing Status:   Future     Standing Expiration Date:   12/3/2021     Order Specific Question:   Reason for exam:     Answer:   Right Suni Yi ? Lipoma/mass    Alpha Fetoprotein, Maternal     Standing Status:   Future     Standing Expiration Date:   12/3/2021     Order Specific Question:   1-Dr's name and phone number? Answer:   Dr. Sandra Rincon     Comments:   [phone]     Order Specific Question:   5-Patient's due date? Answer:   6/11/2021     Order Specific Question:   6-Due date determined by US or LMP? Answer:   6/11/2021     Comments:   US     Order Specific Question:   Last Menstrual Period     Answer:   unknown     Order Specific Question:   9-Is patient on insulin? Answer:   No     Order Specific Question:   10-Fam Hist of Neural Tube Defect? Answer:   No     Order Specific Question:   7-Number of fetuses present? Answer:   single               Patient was seen with total face to face time of 25 minutes. More than 50% of this visit was on counseling and education regarding her    Diagnosis Orders   1. HRP (high risk pregnancy), first trimester  Alpha Fetoprotein, Maternal   2. 12 weeks gestation of pregnancy  Alpha Fetoprotein, Maternal   3. GBS (group B Streptococcus carrier), +RV culture, currently pregnant     4. Rh negative state in antepartum period     5. History of chlamydia     6. Need for rhogam due to Rh negative mother  rho,D, immune globulin (HYPERRHO S/D) 1500 units SOSY injection   7. Subchorionic hematoma in first trimester, single or unspecified fetus     8. Lipoma of Vulva/Right Labia  MRI PELVIS WO CONTRAST   9. ASA 81 mg/dy per MFM      and her options. She was also counseled on her preventative health maintenance recommendations and follow-up.

## 2020-12-03 NOTE — RESULT ENCOUNTER NOTE
Appointment with Dr. Kari Leroy in office today, 12/3/2020, for prenatal visit and to discuss vulvar mass.

## 2020-12-07 RX ORDER — PNV NO.95/FERROUS FUM/FOLIC AC 28MG-0.8MG
1 TABLET ORAL DAILY
Qty: 30 TABLET | Refills: 12 | Status: SHIPPED | OUTPATIENT
Start: 2020-12-07 | End: 2021-01-20 | Stop reason: SDUPTHER

## 2020-12-10 ENCOUNTER — OFFICE VISIT (OUTPATIENT)
Dept: GYNECOLOGIC ONCOLOGY | Age: 20
End: 2020-12-10
Payer: COMMERCIAL

## 2020-12-10 VITALS
HEART RATE: 94 BPM | WEIGHT: 214.6 LBS | SYSTOLIC BLOOD PRESSURE: 126 MMHG | BODY MASS INDEX: 34.49 KG/M2 | DIASTOLIC BLOOD PRESSURE: 83 MMHG | HEIGHT: 66 IN | TEMPERATURE: 97.7 F | OXYGEN SATURATION: 99 %

## 2020-12-10 PROCEDURE — 1036F TOBACCO NON-USER: CPT | Performed by: OBSTETRICS & GYNECOLOGY

## 2020-12-10 PROCEDURE — G8484 FLU IMMUNIZE NO ADMIN: HCPCS | Performed by: OBSTETRICS & GYNECOLOGY

## 2020-12-10 PROCEDURE — 99203 OFFICE O/P NEW LOW 30 MIN: CPT | Performed by: OBSTETRICS & GYNECOLOGY

## 2020-12-10 PROCEDURE — G8419 CALC BMI OUT NRM PARAM NOF/U: HCPCS | Performed by: OBSTETRICS & GYNECOLOGY

## 2020-12-10 PROCEDURE — G8427 DOCREV CUR MEDS BY ELIG CLIN: HCPCS | Performed by: OBSTETRICS & GYNECOLOGY

## 2020-12-10 RX ORDER — ASPIRIN 81 MG/1
81 TABLET, CHEWABLE ORAL DAILY
COMMUNITY
End: 2021-05-25 | Stop reason: ALTCHOICE

## 2020-12-10 NOTE — PROGRESS NOTES
[facility] [address] [address]      I am seeing Ellen Joshi for New Patient at the request of Dr. Tad Smith, OB/GYN, [address] Chief Complaint: Mass right vulva    HPI  She is a 21 y.o.  1, para 0 (13-1/2 weeks pregnant) female who is being referred for a mass right vulva. Patient states that she first noticed a swelling in the right vulvar area a little over 2 years ago. She believes it is about the same size that it was then. It has not bothered her. She has no pain. It was noticed by Dr. Odalis Sánchez who is seeing her for her pregnancy. An ultrasound of the mass was performed on 2020 and revealed \"an ovoid circumscribed solid mass measuring 7.7 x 4.1 x 3.8 cm. It was felt to probably represent a lipoma. He is referred her to [facility] gynecologic oncology for further evaluation and definitive management. He has ordered an MRI (noncontrast) to be done next week. Review of Systems  I have personally reviewed and agree with the review of systems done by my ancillary staff in the [facility] documentation. OBJECTIVE:  Vitals:    12/10/20 1020   BP: 126/83   Pulse: 94   Temp: 97.7 °F (36.5 °C)   TempSrc: Temporal   SpO2: 99%   Weight: 214 lb 9.6 oz (97.3 kg)   Height: 5' 6\" (1.676 m)       General: Alert and oriented x3, no acute distress        Lower Extremities:  No lymphedema, no calf tenderness, no musculoskeletal deformities. Pelvic Exam: External genitalia inspected only. There is a bulge in the right labium mages. It measures approximately 5-1/2 cm in AP dimension and 3-1/2 cm in lateral dimension. It is mobile and nontender. I do not feel a hernia ring. There is no inguinal adenopathy on either side. The left labium mages looks and feels normal.  There are no overlying skin changes. There was no tenderness.     Family History   Problem Relation Age of Onset    Diabetes Maternal Grandmother Assessment:  1. Lipoma of Vulva/Right Labia        Discussion: A discussion was then held with the patient concerning options for management. 1 option would be to remove the mass now as she is in early pregnancy. As time goes on I feel the mass may become larger or at least swell during the pregnancy. The patient states however that she does not wish to have it removed now but wishes to have it removed if necessary after the pregnancy. Plan: I will therefore have her return approximately 6 to 8 weeks following delivery of the pregnancy for reevaluation. We will await the results of the MRI next week. Patient was instructed to call if she has any questions or if symptoms develop as a result of the mass. Follow Up: Early August 2021 or 6 to 8 weeks following her delivery. I spent 30 minutes providing care to the patient and >50% of the time was spent counseling and conoordinating care, discussing the patient's current situation, reviewing her options, counseling and education her and answering her questions. All of the patient's pertinent images, labs and previous records and procedures were reviewed.     Electronically signed by Namrata Langley MD on 12/10/20 at 10:45 AM EST

## 2020-12-17 ENCOUNTER — HOSPITAL ENCOUNTER (OUTPATIENT)
Dept: MRI IMAGING | Age: 20
Discharge: HOME | End: 2020-12-19
Payer: COMMERCIAL

## 2020-12-17 PROCEDURE — 72195 MRI PELVIS W/O DYE: CPT

## 2020-12-23 ENCOUNTER — TELEPHONE (OUTPATIENT)
Dept: OBGYN CLINIC | Age: 20
End: 2020-12-23

## 2020-12-23 NOTE — TELEPHONE ENCOUNTER
Per CNP, patient advised of MRI indicating that labial swelling is due to likely lipoma. Patient voiced an understanding. Patient states she has a visit with Dr. An Greene gyn/onc on 10/10/20 - discussed removal following PP period.

## 2020-12-28 ENCOUNTER — HOSPITAL ENCOUNTER (OUTPATIENT)
Age: 20
Discharge: HOME | End: 2020-12-28
Payer: COMMERCIAL

## 2020-12-28 LAB — RUBV IGG SER QL: 253.7 IU/ML

## 2020-12-28 PROCEDURE — 86762 RUBELLA ANTIBODY: CPT

## 2020-12-28 PROCEDURE — 86735 MUMPS ANTIBODY: CPT

## 2020-12-28 PROCEDURE — 86765 RUBEOLA ANTIBODY: CPT

## 2020-12-28 PROCEDURE — 86787 VARICELLA-ZOSTER ANTIBODY: CPT

## 2020-12-28 PROCEDURE — 86481 TB AG RESPONSE T-CELL SUSP: CPT

## 2020-12-30 ENCOUNTER — ROUTINE PRENATAL (OUTPATIENT)
Dept: OBGYN CLINIC | Age: 20
End: 2020-12-30

## 2020-12-30 VITALS
SYSTOLIC BLOOD PRESSURE: 116 MMHG | DIASTOLIC BLOOD PRESSURE: 72 MMHG | HEART RATE: 76 BPM | BODY MASS INDEX: 35.35 KG/M2 | WEIGHT: 219 LBS

## 2020-12-30 LAB
MEASLES ANTIBODY IGG: 2.05
MUV IGG SER QL: 2.43
VZV IGG SER QL IA: 1.64

## 2020-12-30 PROCEDURE — 0502F SUBSEQUENT PRENATAL CARE: CPT | Performed by: NURSE PRACTITIONER

## 2020-12-30 NOTE — PROGRESS NOTES
Woo Bran is a 21 y.o. female 14w5d        OB History    Para Term  AB Living   1             SAB TAB Ectopic Molar Multiple Live Births                    # Outcome Date GA Lbr John/2nd Weight Sex Delivery Anes PTL Lv   1 Current                      Vitals  BP: 116/72  Weight: 219 lb (99.3 kg)  Pulse: 76  Patient Position: Sitting  Albumin: Negative  Glucose: Negative      The patient was seen and evaluated. There was Positive fetal movements. No contractions or leakage of fluid. Signs and symptoms of  labor as well as labor were reviewed. The Nuchal Translucency testing was reviewed and found to be normal. A single marker MSAFP was ordered for a 15-20 week gestational age window. TOP ST OH Reviewed. Dates were reviewed with the patient. An 18-22 week anatomy ultrasound has been ordered. The patient will return to the office for her next visit in 4 weeks. See antepartum flow sheet. 2020 81 mg ASA per MFM for the prevention of preeclampsia based on the ACOG/USPSTF guidelines        Assessment:  1. Woo Bran is a 21 y.o. female  2.   3. 16w5d    Patient Active Problem List    Diagnosis Date Noted    Lipoma of Vulva/Right Labia 2020     Priority: High     GYN ONC Referral      ASA 81 mg/dy per MFM 2020    GBS (group B Streptococcus carrier), +RV culture, currently pregnant 2020 + GBS- treat in labor per protocol      Rh negative state in antepartum period 2020     Needs rhogam at 29 weeks      Primigravida, antepartum 2020    Vaginal bleeding in pregnancy 2020 - Rhogam given 20 in the ER for vaginal bleeding in early pregnancy.        Subchorionic hematoma in first trimester 2020 - No heavy lifting and pelvic rest initiated per Alejandro Ann CNP      Need for rhogam due to Rh negative mother 2020 - Early rhogam given for vaginal bleeding, last administered 20 in the ER  RHOGAM Dosin2020 and 3/15/2020 and 2020      Father of Baby - Family history of MS (multiple sclerosis) in mother and sister 2020    History of chlamydia 2020 - Patient tested positive for chlamydia on 20 within the ER setting. CORONA completed  Partner tx'd  Barrier recs  STD counseling done    36 weeks : GC Chlamydia HIV and Tpall          Diagnosis Orders   1. Subchorionic hematoma in first trimester, single or unspecified fetus     2. 16 weeks gestation of pregnancy           Plan:          Patient was seen with total face to face time of 15 minutes. More than 50% of this visit was on counseling and education regarding her    Diagnosis Orders   1. Subchorionic hematoma in first trimester, single or unspecified fetus     2. 16 weeks gestation of pregnancy      and her options. She was also counseled on her preventative health maintenance recommendations and follow-up.

## 2020-12-31 LAB — T-SPOT TB TEST: NORMAL

## 2021-01-20 ENCOUNTER — HOSPITAL ENCOUNTER (EMERGENCY)
Age: 21
Discharge: HOME | End: 2021-01-20
Attending: EMERGENCY MEDICINE
Payer: COMMERCIAL

## 2021-01-20 VITALS
TEMPERATURE: 98.2 F | SYSTOLIC BLOOD PRESSURE: 142 MMHG | HEART RATE: 95 BPM | DIASTOLIC BLOOD PRESSURE: 89 MMHG | OXYGEN SATURATION: 100 % | RESPIRATION RATE: 18 BRPM
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
## 2021-01-20 DIAGNOSIS — R03.0 ELEVATED BP WITHOUT DIAGNOSIS OF HYPERTENSION: ICD-10-CM

## 2021-01-20 DIAGNOSIS — Z3A.19 19 WEEKS GESTATION OF PREGNANCY: ICD-10-CM

## 2021-01-20 LAB
% FETAL BLEED: NORMAL %
ABSOLUTE EOS #: 0.07 K/UL (ref 0–0.44)
ABSOLUTE IMMATURE GRANULOCYTE: 0.09 K/UL (ref 0–0.3)
ABSOLUTE LYMPH #: 2.53 K/UL (ref 1.2–5.2)
ABSOLUTE MONO #: 0.58 K/UL (ref 0.1–1.4)
ALBUMIN SERPL-MCNC: 3.7 G/DL (ref 3.5–5.2)
ALBUMIN/GLOBULIN RATIO: 1.7 (ref 1–2.5)
ALP BLD-CCNC: 56 U/L (ref 35–104)
ALT SERPL-CCNC: 27 U/L (ref 5–33)
ANION GAP SERPL CALCULATED.3IONS-SCNC: 9 MMOL/L (ref 9–17)
AST SERPL-CCNC: 22 U/L
BASOPHILS # BLD: 0 % (ref 0–2)
BASOPHILS ABSOLUTE: 0.03 K/UL (ref 0–0.2)
BILIRUB SERPL-MCNC: <0.1 MG/DL (ref 0.3–1.2)
BUN BLDV-MCNC: 6 MG/DL (ref 6–20)
BUN/CREAT BLD: ABNORMAL (ref 9–20)
CALCIUM SERPL-MCNC: 9.6 MG/DL (ref 8.6–10.4)
CHLORIDE BLD-SCNC: 107 MMOL/L (ref 98–107)
CO2: 22 MMOL/L (ref 20–31)
CREAT SERPL-MCNC: 0.43 MG/DL (ref 0.5–0.9)
CREATININE URINE: 48.5 MG/DL (ref 28–217)
DIFFERENTIAL TYPE: ABNORMAL
EOSINOPHILS RELATIVE PERCENT: 1 % (ref 1–4)
FETAL BLEED VOLUME: NORMAL ML
GFR AFRICAN AMERICAN: >60 ML/MIN
GFR NON-AFRICAN AMERICAN: >60 ML/MIN
GFR SERPL CREATININE-BSD FRML MDRD: ABNORMAL ML/MIN/{1.73_M2}
GFR SERPL CREATININE-BSD FRML MDRD: ABNORMAL ML/MIN/{1.73_M2}
GLUCOSE BLD-MCNC: 99 MG/DL (ref 70–99)
HCT VFR BLD CALC: 33.9 % (ref 36.3–47.1)
HEMOGLOBIN: 11.6 G/DL (ref 11.9–15.1)
IMMATURE GRANULOCYTES: 1 %
LYMPHOCYTES # BLD: 22 % (ref 25–45)
MCH RBC QN AUTO: 30.4 PG (ref 25.2–33.5)
MCHC RBC AUTO-ENTMCNC: 34.2 G/DL (ref 28.4–34.8)
MCV RBC AUTO: 89 FL (ref 82.6–102.9)
MONOCYTES # BLD: 5 % (ref 2–8)
NRBC AUTOMATED: 0 PER 100 WBC
PDW BLD-RTO: 12.6 % (ref 11.8–14.4)
PLATELET # BLD: 177 K/UL (ref 138–453)
PLATELET ESTIMATE: ABNORMAL
PMV BLD AUTO: 12.1 FL (ref 8.1–13.5)
POTASSIUM SERPL-SCNC: 3.6 MMOL/L (ref 3.7–5.3)
RBC # BLD: 3.81 M/UL (ref 3.95–5.11)
RBC # BLD: ABNORMAL 10*6/UL
RHOGAM DOSES REQUIRED: 1
SEG NEUTROPHILS: 71 % (ref 34–64)
SEGMENTED NEUTROPHILS ABSOLUTE COUNT: 8.35 K/UL (ref 1.8–8)
SODIUM BLD-SCNC: 138 MMOL/L (ref 135–144)
TOTAL PROTEIN, URINE: 5 MG/DL
TOTAL PROTEIN: 5.9 G/DL (ref 6.4–8.3)
URINE TOTAL PROTEIN CREATININE RATIO: 0.1 (ref 0–0.2)
WBC # BLD: 11.7 K/UL (ref 4.5–13.5)
WBC # BLD: ABNORMAL 10*3/UL

## 2021-01-20 PROCEDURE — 80053 COMPREHEN METABOLIC PANEL: CPT

## 2021-01-20 PROCEDURE — 84156 ASSAY OF PROTEIN URINE: CPT

## 2021-01-20 PROCEDURE — 82570 ASSAY OF URINE CREATININE: CPT

## 2021-01-20 PROCEDURE — 85025 COMPLETE CBC W/AUTO DIFF WBC: CPT

## 2021-01-20 PROCEDURE — 85460 HEMOGLOBIN FETAL: CPT

## 2021-01-20 PROCEDURE — 99282 EMERGENCY DEPT VISIT SF MDM: CPT

## 2021-01-20 RX ORDER — PNV NO.95/FERROUS FUM/FOLIC AC 28MG-0.8MG
1 TABLET ORAL DAILY
Qty: 30 TABLET | Refills: 12 | Status: SHIPPED | OUTPATIENT
Start: 2021-01-20 | End: 2021-06-09

## 2021-01-21 ASSESSMENT — ENCOUNTER SYMPTOMS
COUGH: 0
CHEST TIGHTNESS: 0
PHOTOPHOBIA: 0
SHORTNESS OF BREATH: 0
VOMITING: 0
EYE REDNESS: 0
ABDOMINAL PAIN: 0
BACK PAIN: 0
EYE PAIN: 0
FACIAL SWELLING: 0
WHEEZING: 0
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
## 2021-01-21 NOTE — ED NOTES
The following labs labeled with pt sticker and tubed:     [x] Lavender   [] on ice   [] Blue   [] Green/yellow  [] Green/black [] on ice  [] Pink  [] Red  [] Yellow     [] COVID-19 swab    [] Rapid     [] Urine Sample  [] Pelvic Cultures    [] Blood Cultures            Ping Giron RN  01/20/21 2152       Ping Giron RN  01/20/21 2152

## 2021-01-26 ENCOUNTER — ROUTINE PRENATAL (OUTPATIENT)
Dept: PERINATAL CARE | Age: 21
End: 2021-01-26
Payer: COMMERCIAL

## 2021-01-26 ENCOUNTER — HOSPITAL ENCOUNTER (OUTPATIENT)
Age: 21
Discharge: HOME | End: 2021-01-26
Payer: COMMERCIAL

## 2021-01-26 VITALS
BODY MASS INDEX: 36.64 KG/M2 | WEIGHT: 228 LBS | TEMPERATURE: 97.2 F | DIASTOLIC BLOOD PRESSURE: 72 MMHG | SYSTOLIC BLOOD PRESSURE: 108 MMHG | RESPIRATION RATE: 16 BRPM | HEIGHT: 66 IN | HEART RATE: 110 BPM

## 2021-01-26 DIAGNOSIS — Z3A.12 12 WEEKS GESTATION OF PREGNANCY: ICD-10-CM

## 2021-01-26 DIAGNOSIS — Z36.86 SCREENING, ANTENATAL, FOR RISK OF PRE-TERM LABOR: ICD-10-CM

## 2021-01-26 DIAGNOSIS — O99.212 OBESITY AFFECTING PREGNANCY IN SECOND TRIMESTER: ICD-10-CM

## 2021-01-26 DIAGNOSIS — Z3A.20 20 WEEKS GESTATION OF PREGNANCY: ICD-10-CM

## 2021-01-26 DIAGNOSIS — O44.40 LOW-LYING PLACENTA: ICD-10-CM

## 2021-01-26 DIAGNOSIS — O16.2 HYPERTENSION AFFECTING PREGNANCY IN SECOND TRIMESTER: Primary | ICD-10-CM

## 2021-01-26 DIAGNOSIS — O09.92 SUPERVISION OF HIGH-RISK PREGNANCY, SECOND TRIMESTER: Primary | ICD-10-CM

## 2021-01-26 DIAGNOSIS — O43.192 MARGINAL INSERTION OF UMBILICAL CORD AFFECTING MANAGEMENT OF MOTHER IN SECOND TRIMESTER: ICD-10-CM

## 2021-01-26 DIAGNOSIS — O44.22 PLACENTA MARGINALIS IN SECOND TRIMESTER: ICD-10-CM

## 2021-01-26 DIAGNOSIS — O09.91 HRP (HIGH RISK PREGNANCY), FIRST TRIMESTER: ICD-10-CM

## 2021-01-26 DIAGNOSIS — O44.40 LOW IMPLANTATION OF PLACENTA WITHOUT HEMORRHAGE: ICD-10-CM

## 2021-01-26 DIAGNOSIS — O10.919 CHRONIC HYPERTENSION AFFECTING PREGNANCY: ICD-10-CM

## 2021-01-26 PROCEDURE — 76811 OB US DETAILED SNGL FETUS: CPT | Performed by: OBSTETRICS & GYNECOLOGY

## 2021-01-26 PROCEDURE — 76817 TRANSVAGINAL US OBSTETRIC: CPT | Performed by: OBSTETRICS & GYNECOLOGY

## 2021-01-26 PROCEDURE — 82105 ALPHA-FETOPROTEIN SERUM: CPT

## 2021-01-26 PROCEDURE — 36415 COLL VENOUS BLD VENIPUNCTURE: CPT

## 2021-01-27 ENCOUNTER — ROUTINE PRENATAL (OUTPATIENT)
Dept: OBGYN CLINIC | Age: 21
End: 2021-01-27

## 2021-01-27 VITALS
SYSTOLIC BLOOD PRESSURE: 108 MMHG | BODY MASS INDEX: 36.64 KG/M2 | WEIGHT: 227 LBS | HEART RATE: 72 BPM | DIASTOLIC BLOOD PRESSURE: 74 MMHG

## 2021-01-27 DIAGNOSIS — D17.30 LIPOMA OF SKIN AND SUBCUTANEOUS TISSUE: ICD-10-CM

## 2021-01-27 DIAGNOSIS — O09.92 HIGH-RISK PREGNANCY IN SECOND TRIMESTER: Primary | ICD-10-CM

## 2021-01-27 DIAGNOSIS — R89.9 ABNORMAL LABORATORY TEST: ICD-10-CM

## 2021-01-27 DIAGNOSIS — O99.820 GBS (GROUP B STREPTOCOCCUS CARRIER), +RV CULTURE, CURRENTLY PREGNANT: ICD-10-CM

## 2021-01-27 DIAGNOSIS — Z67.91 RH NEGATIVE STATE IN ANTEPARTUM PERIOD: ICD-10-CM

## 2021-01-27 DIAGNOSIS — Z3A.20 20 WEEKS GESTATION OF PREGNANCY: ICD-10-CM

## 2021-01-27 DIAGNOSIS — O26.899 RH NEGATIVE STATE IN ANTEPARTUM PERIOD: ICD-10-CM

## 2021-01-27 PROBLEM — O43.199 MARGINAL INSERTION OF UMBILICAL CORD AFFECTING MANAGEMENT OF MOTHER: Status: ACTIVE | Noted: 2021-01-27

## 2021-01-27 PROBLEM — O44.40 LOW-LYING PLACENTA: Status: ACTIVE | Noted: 2021-01-27

## 2021-01-27 PROCEDURE — 0502F SUBSEQUENT PRENATAL CARE: CPT | Performed by: OBSTETRICS & GYNECOLOGY

## 2021-01-27 RX ORDER — VITAMIN A ACETATE, .BETA.-CAROTENE, ASCORBIC ACID, CHOLECALCIFEROL, .ALPHA.-TOCOPHEROL ACETATE, DL-, THIAMINE MONONITRATE, RIBOFLAVIN, NIACINAMIDE, PYRIDOXINE HYDROCHLORIDE, FOLIC ACID, CYANOCOBALAMIN, CALCIUM CARBONATE, FERROUS FUMARATE, ZINC OXIDE, AND CUPRIC OXIDE 2000; 2000; 120; 400; 22; 1.84; 3; 20; 10; 1; 12; 200; 27; 25; 2 [IU]/1; [IU]/1; MG/1; [IU]/1; MG/1; MG/1; MG/1; MG/1; MG/1; MG/1; UG/1; MG/1; MG/1; MG/1; MG/1
TABLET ORAL
COMMUNITY
Start: 2021-01-20 | End: 2021-01-27

## 2021-01-27 NOTE — PROGRESS NOTES
Daniel Child is a 21 y.o. female 25w5d        OB History    Para Term  AB Living   1             SAB TAB Ectopic Molar Multiple Live Births                    # Outcome Date GA Lbr John/2nd Weight Sex Delivery Anes PTL Lv   1 Current                Vitals  BP: 108/74  Weight: 227 lb (103 kg)  Pulse: 72  Patient Position: Sitting  Albumin: Negative  Glucose: Negative  Fetal Heart Rate: 145  Movement: Present    + intermittent FM  No VB  NO LOF  NO CTX  No complaints or issues  Had AFP - result pending  Had anatomy US yesterday with MFM - has follow up on 2021 81 mg ASA per MFM for the prevention of preeclampsia based on the ACOG/USPSTF guidelines      Assessment:  1. Daniel Child is a 21 y.o. female  2.   3. 20w5d    Patient Active Problem List    Diagnosis Date Noted    Lipoma of Vulva/Right Labia 2020     Priority: High     GYN ONC Referral      Elevated BP x2 2021: 148/92   21: 142/91, PreE labs wnl, P/C 0.10    Patient is on ASA 81mg for high risk status, compliant with medication      ASA 81 mg/dy per MFM 2020    GBS (group B Streptococcus carrier), +RV culture, currently pregnant 2020 + GBS- treat in labor per protocol      Rh negative state in antepartum period 2020     Needs rhogam at 29 weeks      Primigravida, antepartum 2020    Vaginal bleeding in pregnancy 2020 - Rhogam given 20 in the ER for vaginal bleeding in early pregnancy.        Subchorionic hematoma in first trimester 2020 - No heavy lifting and pelvic rest initiated per Gayle Christian CNP      Need for rhogam due to Rh negative mother 2020 - Early rhogam given for vaginal bleeding, last administered 20 in the ER  RHOGAM Dosin2020 and 3/15/2020 and 2020      Father of Baby - Family history of MS (multiple sclerosis) in mother and sister 11/04/2020    History of chlamydia 11/04/2020 11/4/20 - Patient tested positive for chlamydia on 9/24/20 within the ER setting. CORONA completed  Partner tx'd  Barrier recs  STD counseling done    36 weeks : GC Chlamydia HIV and Tpall          Diagnosis Orders   1. High-risk pregnancy in second trimester     2. Lipoma of skin and subcutaneous tissue     3. GBS (group B Streptococcus carrier), +RV culture, currently pregnant     4. Rh negative state in antepartum period     5. Abnormal laboratory test     6. 20 weeks gestation of pregnancy           Plan:  The patient will return to the office for her next visit in 4 weeks. See antepartum flow sheet.

## 2021-01-29 LAB
AFP INTERPRETATION: NORMAL
AFP MOM: 1.19
AFP SPECIMEN: NORMAL
AFP: 55 NG/ML
DATE OF BIRTH: NORMAL
DATING METHOD: NORMAL
DETERMINED BY: NORMAL
DIABETIC: NO
DUE DATE: NORMAL
ESTIMATED DUE DATE: NORMAL
FAMILY HISTORY NTD: NO
GESTATIONAL AGE: NORMAL
INSULIN REQ DIABETES: NO
LAST MENSTRUAL PERIOD: NORMAL
MATERNAL AGE AT EDD: 21.2 YR
MATERNAL WEIGHT: 228
MONOCHORIONIC TWINS: NORMAL
NUMBER OF FETUSES: NORMAL
PATIENT WEIGHT UNITS: NORMAL
PATIENT WEIGHT: NORMAL
RACE (MATERNAL): NORMAL
RACE: NORMAL
REPEAT SPECIMEN?: NO
SMOKING: NO
SMOKING: NO
VALPROIC/CARBAMAZEP: NO
ZZ NTE CLEAN UP: HISTORY: NO

## 2021-02-08 ENCOUNTER — HOSPITAL ENCOUNTER (OUTPATIENT)
Age: 21
Setting detail: SPECIMEN
Discharge: HOME | End: 2021-02-08
Payer: COMMERCIAL

## 2021-02-08 LAB
BUN BLDV-MCNC: 10 MG/DL (ref 6–20)
CREAT SERPL-MCNC: 0.51 MG/DL (ref 0.5–0.9)
CREAT SERPL-MCNC: 0.51 MG/DL (ref 0.5–0.9)
CREATININE CLEARANCE: 164.1 ML/MIN/BSA (ref 71–151)
CREATININE TIMED UR: NORMAL MG/ X H
CREATININE URINE: 119.1 MG/DL
CREATININE URINE: 119.1 MG/DL (ref 28–217)
CREATININE, 24H UR: 1472 MG/24 H (ref 740–1570)
GFR AFRICAN AMERICAN: >60 ML/MIN
GFR NON-AFRICAN AMERICAN: >60 ML/MIN
GFR SERPL CREATININE-BSD FRML MDRD: NORMAL ML/MIN/{1.73_M2}
GFR SERPL CREATININE-BSD FRML MDRD: NORMAL ML/MIN/{1.73_M2}
HOURS COLLECTED: 24 H
HOURS COLLECTED: 24 H
LENGTH OF COLLECTION: 24 H
PATIENT HEIGHT: 168 CM
PATIENT WEIGHT: 103 KG
PROTEIN 24 HOUR URINE: 124 MG/24 H
PROTEIN,TOT TIMED UR: NORMAL MG/X H
URINE TOTAL PROTEIN: 10 MG/DL
VOLUME: 1236 ML

## 2021-02-08 PROCEDURE — 82565 ASSAY OF CREATININE: CPT

## 2021-02-08 PROCEDURE — 84520 ASSAY OF UREA NITROGEN: CPT

## 2021-02-08 PROCEDURE — 84156 ASSAY OF PROTEIN URINE: CPT

## 2021-02-08 PROCEDURE — 36415 COLL VENOUS BLD VENIPUNCTURE: CPT

## 2021-02-08 PROCEDURE — 82575 CREATININE CLEARANCE TEST: CPT

## 2021-02-08 PROCEDURE — 82570 ASSAY OF URINE CREATININE: CPT

## 2021-02-23 ENCOUNTER — ROUTINE PRENATAL (OUTPATIENT)
Dept: PERINATAL CARE | Age: 21
End: 2021-02-23
Payer: COMMERCIAL

## 2021-02-23 VITALS
BODY MASS INDEX: 38.73 KG/M2 | WEIGHT: 241 LBS | HEART RATE: 98 BPM | DIASTOLIC BLOOD PRESSURE: 72 MMHG | SYSTOLIC BLOOD PRESSURE: 118 MMHG | RESPIRATION RATE: 16 BRPM | TEMPERATURE: 97.2 F | HEIGHT: 66 IN

## 2021-02-23 DIAGNOSIS — O16.2 HYPERTENSION AFFECTING PREGNANCY IN SECOND TRIMESTER: Primary | ICD-10-CM

## 2021-02-23 DIAGNOSIS — O44.40 LOW IMPLANTATION OF PLACENTA WITHOUT HEMORRHAGE: ICD-10-CM

## 2021-02-23 DIAGNOSIS — Z03.72 SUSPECTED PLACENTAL PROBLEM NOT FOUND: ICD-10-CM

## 2021-02-23 DIAGNOSIS — Z3A.24 24 WEEKS GESTATION OF PREGNANCY: ICD-10-CM

## 2021-02-23 DIAGNOSIS — O44.22 PLACENTA MARGINALIS IN SECOND TRIMESTER: ICD-10-CM

## 2021-02-23 DIAGNOSIS — O99.212 OBESITY AFFECTING PREGNANCY IN SECOND TRIMESTER: ICD-10-CM

## 2021-02-23 DIAGNOSIS — Z36.4 ANTENATAL SCREENING FOR FETAL GROWTH RETARDATION USING ULTRASONICS: ICD-10-CM

## 2021-02-23 PROCEDURE — G8419 CALC BMI OUT NRM PARAM NOF/U: HCPCS | Performed by: OBSTETRICS & GYNECOLOGY

## 2021-02-23 PROCEDURE — 99243 OFF/OP CNSLTJ NEW/EST LOW 30: CPT | Performed by: OBSTETRICS & GYNECOLOGY

## 2021-02-23 PROCEDURE — 76816 OB US FOLLOW-UP PER FETUS: CPT | Performed by: OBSTETRICS & GYNECOLOGY

## 2021-02-23 PROCEDURE — G8484 FLU IMMUNIZE NO ADMIN: HCPCS | Performed by: OBSTETRICS & GYNECOLOGY

## 2021-02-23 PROCEDURE — 76817 TRANSVAGINAL US OBSTETRIC: CPT | Performed by: OBSTETRICS & GYNECOLOGY

## 2021-02-23 PROCEDURE — G8427 DOCREV CUR MEDS BY ELIG CLIN: HCPCS | Performed by: OBSTETRICS & GYNECOLOGY

## 2021-02-23 PROCEDURE — 76820 UMBILICAL ARTERY ECHO: CPT | Performed by: OBSTETRICS & GYNECOLOGY

## 2021-02-24 ENCOUNTER — ROUTINE PRENATAL (OUTPATIENT)
Dept: OBGYN CLINIC | Age: 21
End: 2021-02-24

## 2021-02-24 VITALS
WEIGHT: 240 LBS | HEART RATE: 90 BPM | DIASTOLIC BLOOD PRESSURE: 80 MMHG | SYSTOLIC BLOOD PRESSURE: 118 MMHG | BODY MASS INDEX: 38.74 KG/M2 | TEMPERATURE: 97.6 F

## 2021-02-24 DIAGNOSIS — R89.9 ABNORMAL LABORATORY TEST: ICD-10-CM

## 2021-02-24 DIAGNOSIS — D17.30 LIPOMA OF SKIN AND SUBCUTANEOUS TISSUE: ICD-10-CM

## 2021-02-24 DIAGNOSIS — O43.199 MARGINAL INSERTION OF UMBILICAL CORD AFFECTING MANAGEMENT OF MOTHER: ICD-10-CM

## 2021-02-24 DIAGNOSIS — O44.40 LOW-LYING PLACENTA: ICD-10-CM

## 2021-02-24 DIAGNOSIS — O99.820 GBS (GROUP B STREPTOCOCCUS CARRIER), +RV CULTURE, CURRENTLY PREGNANT: ICD-10-CM

## 2021-02-24 DIAGNOSIS — O26.899 RH NEGATIVE STATE IN ANTEPARTUM PERIOD: ICD-10-CM

## 2021-02-24 DIAGNOSIS — Z3A.24 24 WEEKS GESTATION OF PREGNANCY: Primary | ICD-10-CM

## 2021-02-24 DIAGNOSIS — Z67.91 RH NEGATIVE STATE IN ANTEPARTUM PERIOD: ICD-10-CM

## 2021-02-24 PROCEDURE — 0502F SUBSEQUENT PRENATAL CARE: CPT | Performed by: NURSE PRACTITIONER

## 2021-02-24 NOTE — PROGRESS NOTES
Yosvany Hernandez is a 21 y.o. female 19w9d        OB History    Para Term  AB Living   1             SAB TAB Ectopic Molar Multiple Live Births                    # Outcome Date GA Lbr John/2nd Weight Sex Delivery Anes PTL Lv   1 Current                Vitals  BP: 118/80  Weight: 240 lb (108.9 kg)  Pulse: 90  Patient Position: Sitting  Albumin: Negative    The patient was seen and evaluated. There was positive fetal movements. No contractions or leakage of fluid. Signs and symptoms of  labor as well as labor were reviewed. The Nuchal Translucency testing was reviewed and found to be normal A single marker MSAFP was reviewed and found to be normal. The patients anatomy ultrasound has been completed and reviewed with patient. TOP ST OH Reviewed. A 28 week lab panel was ordered. This includes a (HH, 1 hr GTT, U/A C&S). The patient is to complete this in the next two to four weeks. The S/S of Pre-Eclampsia were reviewed with the patient in detail. She is to report any of these if they occur. She currently denies any of these. The patient is RH negative Rhogam Ordered yes    The patient was instructed on fetal kick counts and was given a kick sheet to complete every 8 hours. This is to begin at 28 weeks gestation. She was instructed that the baby should move at a minimum of ten times within one hour after a meal. The patient was instructed to lay down on her left side twenty minutes after eating and count movements for up to one hour with a target value of ten movements. She was instructed to notify the office if she did not make that target after two attempts or if after any attempt there was less than four movements. 2020 81 mg ASA per MFM for the prevention of preeclampsia based on the ACOG/USPSTF guidelines      Assessment:  1Nelson Hernandez is a 21 y.o. female  2.    3. 24w5d    Patient Active Problem List    Diagnosis Date Noted    Marginal insertion of umbilical cord affecting management of mother 2021     Priority: High    Low-lying placenta 2021     Priority: High     2021 follow up in 4 weeks       Lipoma of Vulva/Right Labia 2020     Priority: High     GYN ONC Referral      Elevated BP x2 2021: 148/92   21: 142/91, PreE labs wnl, P/C 0.10    Patient is on ASA 81mg for high risk status, compliant with medication      ASA 81 mg/dy per MFM 2020    GBS (group B Streptococcus carrier), +RV culture, currently pregnant 2020 + GBS- treat in labor per protocol      Rh negative state in antepartum period 2020     Needs rhogam at 29 weeks      Primigravida, antepartum 2020    Vaginal bleeding in pregnancy 2020 - Rhogam given 20 in the ER for vaginal bleeding in early pregnancy.  Subchorionic hematoma in first trimester 2020 - No heavy lifting and pelvic rest initiated per Diego Sanford CNP      Need for rhogam due to Rh negative mother 2020 - Early rhogam given for vaginal bleeding, last administered 20 in the ER  RHOGAM Dosin2020 and 3/15/2020 and 2020      Father of Baby - Family history of MS (multiple sclerosis) in mother and sister 2020    History of chlamydia 2020 - Patient tested positive for chlamydia on 20 within the ER setting. CORONA completed  Partner tx'd  Barrier recs  STD counseling done    36 weeks : GC Chlamydia HIV and Tpall          Diagnosis Orders   1. 24 weeks gestation of pregnancy     2. Marginal insertion of umbilical cord affecting management of mother     3. Lipoma of skin and subcutaneous tissue     4. Abnormal laboratory test     5. GBS (group B Streptococcus carrier), +RV culture, currently pregnant     6. Rh negative state in antepartum period     7.  Low-lying placenta           Plan:  The patient will return to the office for her next visit in 3 weeks. See antepartum flow sheet. Lab slip given       Patient was seen with total face to face time of 15 minutes. More than 50% of this visit was on counseling and education regarding her    Diagnosis Orders   1. 24 weeks gestation of pregnancy     2. Marginal insertion of umbilical cord affecting management of mother     3. Lipoma of skin and subcutaneous tissue     4. Abnormal laboratory test     5. GBS (group B Streptococcus carrier), +RV culture, currently pregnant     6. Rh negative state in antepartum period     7. Low-lying placenta      and her options. She was also counseled on her preventative health maintenance recommendations and follow-up.

## 2021-03-22 ENCOUNTER — HOSPITAL ENCOUNTER (OUTPATIENT)
Age: 21
Discharge: HOME | End: 2021-03-22
Payer: COMMERCIAL

## 2021-03-22 ENCOUNTER — ROUTINE PRENATAL (OUTPATIENT)
Dept: OBGYN CLINIC | Age: 21
End: 2021-03-22

## 2021-03-22 ENCOUNTER — APPOINTMENT (OUTPATIENT)
Dept: LABOR AND DELIVERY | Age: 21
End: 2021-03-22
Payer: COMMERCIAL

## 2021-03-22 VITALS
DIASTOLIC BLOOD PRESSURE: 68 MMHG | BODY MASS INDEX: 40.03 KG/M2 | SYSTOLIC BLOOD PRESSURE: 114 MMHG | WEIGHT: 248 LBS | HEART RATE: 94 BPM

## 2021-03-22 DIAGNOSIS — O99.820 GBS (GROUP B STREPTOCOCCUS CARRIER), +RV CULTURE, CURRENTLY PREGNANT: ICD-10-CM

## 2021-03-22 DIAGNOSIS — O26.899 RH NEGATIVE STATE IN ANTEPARTUM PERIOD: ICD-10-CM

## 2021-03-22 DIAGNOSIS — O43.199 MARGINAL INSERTION OF UMBILICAL CORD AFFECTING MANAGEMENT OF MOTHER: Primary | ICD-10-CM

## 2021-03-22 DIAGNOSIS — D17.30 LIPOMA OF SKIN AND SUBCUTANEOUS TISSUE: ICD-10-CM

## 2021-03-22 DIAGNOSIS — Z67.91 RH NEGATIVE STATE IN ANTEPARTUM PERIOD: ICD-10-CM

## 2021-03-22 DIAGNOSIS — R89.9 ABNORMAL LABORATORY TEST: ICD-10-CM

## 2021-03-22 PROCEDURE — 0502F SUBSEQUENT PRENATAL CARE: CPT | Performed by: NURSE PRACTITIONER

## 2021-03-22 PROCEDURE — 86850 RBC ANTIBODY SCREEN: CPT

## 2021-03-22 PROCEDURE — 36415 COLL VENOUS BLD VENIPUNCTURE: CPT

## 2021-03-22 PROCEDURE — 86901 BLOOD TYPING SEROLOGIC RH(D): CPT

## 2021-03-22 PROCEDURE — 96372 THER/PROPH/DIAG INJ SC/IM: CPT

## 2021-03-22 PROCEDURE — 86900 BLOOD TYPING SEROLOGIC ABO: CPT

## 2021-03-22 PROCEDURE — 6360000002 HC RX W HCPCS: Performed by: OBSTETRICS & GYNECOLOGY

## 2021-03-22 RX ADMIN — HUMAN RHO(D) IMMUNE GLOBULIN 300 MCG: 1500 SOLUTION INTRAMUSCULAR; INTRAVENOUS at 09:24

## 2021-03-22 NOTE — PROGRESS NOTES
Leisa Maya is a 21 y.o. female 29w2d        OB History    Para Term  AB Living   1             SAB TAB Ectopic Molar Multiple Live Births                    # Outcome Date GA Lbr John/2nd Weight Sex Delivery Anes PTL Lv   1 Current                Vitals  BP: 114/68  Weight: 248 lb (112.5 kg)  Pulse: 94  Patient Position: Sitting  Albumin: Negative  Glucose: Negative      The patient was seen and evaluated. There was positive fetal movements. No contractions or leakage of fluid. Signs and symptoms of  labor as well as labor were reviewed. The S/S of Pre-Eclampsia were reviewed with the patient in detail. She is to report any of these if they occur. She currently denies any of these. The patient had her 28 week labs ordered. Patient completed rhogam today at lab. To complete 1 hourGTT, cbc, UA. Hospital Outpatient Visit on 2021   Component Date Value Ref Range Status    Product Code 2021 MANUFACTURED PRODUCT   Final    Number of Units 2021 1   Final    ABO/Rh 2021 O NEGATIVE   Final    Antibody Screen 2021 NEGATIVE   Final    History Check 2021 O NEG   Final    Unit Number 2021 [id number]   Final    Product Code 2021 RHIG   Final    Unit Divison 2021 00   Final    Dispense Status 2021 ISSUED   Preliminary    Transfusion Status 2021 OK TO TRANSFUSE   Final   ]    2020 81 mg ASA per MFM for the prevention of preeclampsia based on the ACOG/USPSTF guidelines    3/22/21 patient declined tdap vaccine      T-Dap Vaccine (27-36 weeks): declines    The patient was instructed on fetal kick counts and was given a kick sheet to complete every 8 hours.  She was instructed that the baby should move at a minimum of ten times within one hour after a meal. The patient was instructed to lay down on her left side twenty minutes after eating and count movements for up to one hour with a target value of ten HIV and Tpall          Diagnosis Orders   1. Marginal insertion of umbilical cord affecting management of mother     2. Lipoma of skin and subcutaneous tissue     3. Abnormal laboratory test     4. GBS (group B Streptococcus carrier), +RV culture, currently pregnant     5. Rh negative state in antepartum period     6. Low-lying placenta               Plan:  The patient will return to the office for her next visit in 2 weeks. See antepartum flow sheet. 28 week labs reprinted. Completed rhogam today prior to visit. [facility] appointment 3/23/2021   Testing Indicated: No  Scheduled with Nursing-Pt notified: No      Patient was seen with total face to face time of 20 minutes. More than 50% of this visit was on counseling and education regarding her    Diagnosis Orders   1. Marginal insertion of umbilical cord affecting management of mother     2. Lipoma of skin and subcutaneous tissue     3. Abnormal laboratory test     4. GBS (group B Streptococcus carrier), +RV culture, currently pregnant     5. Rh negative state in antepartum period     6. Low-lying placenta      and her options. She was also counseled on her preventative health maintenance recommendations and follow-up.

## 2021-03-23 ENCOUNTER — ROUTINE PRENATAL (OUTPATIENT)
Dept: PERINATAL CARE | Age: 21
End: 2021-03-23
Payer: COMMERCIAL

## 2021-03-23 VITALS
WEIGHT: 250 LBS | TEMPERATURE: 97.2 F | HEIGHT: 66 IN | SYSTOLIC BLOOD PRESSURE: 136 MMHG | BODY MASS INDEX: 40.18 KG/M2 | RESPIRATION RATE: 16 BRPM | DIASTOLIC BLOOD PRESSURE: 74 MMHG | HEART RATE: 88 BPM

## 2021-03-23 DIAGNOSIS — O16.3 HYPERTENSION AFFECTING PREGNANCY IN THIRD TRIMESTER: Primary | ICD-10-CM

## 2021-03-23 DIAGNOSIS — Z36.4 ANTENATAL SCREENING FOR FETAL GROWTH RETARDATION USING ULTRASONICS: ICD-10-CM

## 2021-03-23 DIAGNOSIS — O44.23 PLACENTA MARGINALIS IN THIRD TRIMESTER: ICD-10-CM

## 2021-03-23 DIAGNOSIS — O99.213 OBESITY AFFECTING PREGNANCY IN THIRD TRIMESTER: ICD-10-CM

## 2021-03-23 DIAGNOSIS — Z13.89 ENCOUNTER FOR ROUTINE SCREENING FOR MALFORMATION USING ULTRASONICS: ICD-10-CM

## 2021-03-23 DIAGNOSIS — Z3A.28 28 WEEKS GESTATION OF PREGNANCY: ICD-10-CM

## 2021-03-23 PROBLEM — I10 CHRONIC HYPERTENSION: Status: ACTIVE | Noted: 2021-03-23

## 2021-03-23 LAB
ABDOMINAL CIRCUMFERENCE: NORMAL
ABO/RH: NORMAL
ANTIBODY SCREEN: NEGATIVE
BIPARIETAL DIAMETER: NORMAL
BLD PROD TYP BPU: NORMAL
BLD PROD TYP BPU: NORMAL
DISPENSE STATUS BLOOD BANK: NORMAL
ESTIMATED FETAL WEIGHT: NORMAL
FEMORAL DIAMETER: NORMAL
HC/AC: NORMAL
HEAD CIRCUMFERENCE: NORMAL
HISTORY CHECK: NORMAL
Lab: 1
TRANSFUSION STATUS: NORMAL
UNIT DIVISION: 0
UNIT NUMBER: NORMAL

## 2021-03-23 PROCEDURE — 76805 OB US >/= 14 WKS SNGL FETUS: CPT | Performed by: OBSTETRICS & GYNECOLOGY

## 2021-03-23 PROCEDURE — 99999 PR OFFICE/OUTPT VISIT,PROCEDURE ONLY: CPT | Performed by: OBSTETRICS & GYNECOLOGY

## 2021-03-23 PROCEDURE — 76817 TRANSVAGINAL US OBSTETRIC: CPT | Performed by: OBSTETRICS & GYNECOLOGY

## 2021-03-23 PROCEDURE — 76819 FETAL BIOPHYS PROFIL W/O NST: CPT | Performed by: OBSTETRICS & GYNECOLOGY

## 2021-03-23 PROCEDURE — 76820 UMBILICAL ARTERY ECHO: CPT | Performed by: OBSTETRICS & GYNECOLOGY

## 2021-03-27 ENCOUNTER — HOSPITAL ENCOUNTER (OUTPATIENT)
Age: 21
Discharge: HOME | End: 2021-03-27
Payer: COMMERCIAL

## 2021-03-27 DIAGNOSIS — Z3A.24 24 WEEKS GESTATION OF PREGNANCY: ICD-10-CM

## 2021-03-27 LAB
GLUCOSE ADMINISTRATION: NORMAL
GLUCOSE TOLERANCE SCREEN 50G: 123 MG/DL (ref 70–135)

## 2021-03-27 PROCEDURE — 36415 COLL VENOUS BLD VENIPUNCTURE: CPT

## 2021-03-27 PROCEDURE — 82950 GLUCOSE TEST: CPT

## 2021-04-05 ENCOUNTER — ROUTINE PRENATAL (OUTPATIENT)
Dept: OBGYN CLINIC | Age: 21
End: 2021-04-05

## 2021-04-05 VITALS
BODY MASS INDEX: 41.32 KG/M2 | DIASTOLIC BLOOD PRESSURE: 80 MMHG | HEART RATE: 96 BPM | SYSTOLIC BLOOD PRESSURE: 122 MMHG | WEIGHT: 256 LBS

## 2021-04-05 DIAGNOSIS — O99.820 GBS (GROUP B STREPTOCOCCUS CARRIER), +RV CULTURE, CURRENTLY PREGNANT: ICD-10-CM

## 2021-04-05 DIAGNOSIS — Z67.91 RH NEGATIVE STATE IN ANTEPARTUM PERIOD: ICD-10-CM

## 2021-04-05 DIAGNOSIS — O26.899 RH NEGATIVE STATE IN ANTEPARTUM PERIOD: ICD-10-CM

## 2021-04-05 DIAGNOSIS — R89.9 ABNORMAL LABORATORY TEST: ICD-10-CM

## 2021-04-05 DIAGNOSIS — D17.30 LIPOMA OF SKIN AND SUBCUTANEOUS TISSUE: ICD-10-CM

## 2021-04-05 DIAGNOSIS — Z3A.30 30 WEEKS GESTATION OF PREGNANCY: ICD-10-CM

## 2021-04-05 DIAGNOSIS — O44.40 LOW-LYING PLACENTA: ICD-10-CM

## 2021-04-05 DIAGNOSIS — O43.199 MARGINAL INSERTION OF UMBILICAL CORD AFFECTING MANAGEMENT OF MOTHER: Primary | ICD-10-CM

## 2021-04-05 DIAGNOSIS — I10 CHRONIC HYPERTENSION: ICD-10-CM

## 2021-04-05 PROBLEM — O41.8X10 SUBCHORIONIC HEMATOMA IN FIRST TRIMESTER: Status: RESOLVED | Noted: 2020-11-04 | Resolved: 2021-04-05

## 2021-04-05 PROBLEM — Z34.00 PRIMIGRAVIDA, ANTEPARTUM: Status: RESOLVED | Noted: 2020-11-04 | Resolved: 2021-04-05

## 2021-04-05 PROBLEM — O46.8X1 SUBCHORIONIC HEMATOMA IN FIRST TRIMESTER: Status: RESOLVED | Noted: 2020-11-04 | Resolved: 2021-04-05

## 2021-04-05 PROBLEM — Z29.13 NEED FOR RHOGAM DUE TO RH NEGATIVE MOTHER: Status: RESOLVED | Noted: 2020-11-04 | Resolved: 2021-04-05

## 2021-04-05 PROBLEM — O46.90 VAGINAL BLEEDING IN PREGNANCY: Status: RESOLVED | Noted: 2020-11-04 | Resolved: 2021-04-05

## 2021-04-05 PROBLEM — Z86.19 HISTORY OF CHLAMYDIA: Status: RESOLVED | Noted: 2020-11-04 | Resolved: 2021-04-05

## 2021-04-05 PROBLEM — Z82.0 FAMILY HISTORY OF MS (MULTIPLE SCLEROSIS): Status: RESOLVED | Noted: 2020-11-04 | Resolved: 2021-04-05

## 2021-04-05 PROBLEM — R03.0 ELEVATED BP WITHOUT DIAGNOSIS OF HYPERTENSION: Status: RESOLVED | Noted: 2021-01-20 | Resolved: 2021-04-05

## 2021-04-05 PROCEDURE — 0502F SUBSEQUENT PRENATAL CARE: CPT | Performed by: OBSTETRICS & GYNECOLOGY

## 2021-04-08 ENCOUNTER — TELEPHONE (OUTPATIENT)
Dept: OBGYN CLINIC | Age: 21
End: 2021-04-08

## 2021-04-08 DIAGNOSIS — Z3A.24 24 WEEKS GESTATION OF PREGNANCY: ICD-10-CM

## 2021-04-08 RX ORDER — AMPICILLIN 500 MG/1
500 CAPSULE ORAL 4 TIMES DAILY
Qty: 40 CAPSULE | Refills: 0 | Status: SHIPPED | OUTPATIENT
Start: 2021-04-08 | End: 2021-04-18

## 2021-04-08 NOTE — TELEPHONE ENCOUNTER
----- Message from GORDON Mohan NP sent at 4/8/2021  1:02 PM EDT -----  GBS in urine  Ampicillin 500 mg PO QID x 10 days

## 2021-04-20 ENCOUNTER — ROUTINE PRENATAL (OUTPATIENT)
Dept: PERINATAL CARE | Age: 21
End: 2021-04-20
Payer: COMMERCIAL

## 2021-04-20 VITALS
DIASTOLIC BLOOD PRESSURE: 79 MMHG | WEIGHT: 258 LBS | RESPIRATION RATE: 16 BRPM | SYSTOLIC BLOOD PRESSURE: 136 MMHG | HEART RATE: 75 BPM | BODY MASS INDEX: 41.46 KG/M2 | HEIGHT: 66 IN | TEMPERATURE: 97.3 F

## 2021-04-20 DIAGNOSIS — O41.93X0 ABNORMAL AMNIOTIC FLUID IN THIRD TRIMESTER, SINGLE OR UNSPECIFIED FETUS: ICD-10-CM

## 2021-04-20 DIAGNOSIS — O99.213 OBESITY AFFECTING PREGNANCY IN THIRD TRIMESTER: ICD-10-CM

## 2021-04-20 DIAGNOSIS — Z36.4 ANTENATAL SCREENING FOR FETAL GROWTH RETARDATION USING ULTRASONICS: ICD-10-CM

## 2021-04-20 DIAGNOSIS — O35.07X0 FETAL MICROCEPHALY AFFECTING ANTEPARTUM CARE OF MOTHER, SINGLE OR UNSPECIFIED FETUS: Primary | ICD-10-CM

## 2021-04-20 DIAGNOSIS — Z3A.32 32 WEEKS GESTATION OF PREGNANCY: ICD-10-CM

## 2021-04-20 DIAGNOSIS — O44.23 PLACENTA MARGINALIS IN THIRD TRIMESTER: ICD-10-CM

## 2021-04-20 DIAGNOSIS — O16.3 HYPERTENSION AFFECTING PREGNANCY IN THIRD TRIMESTER: Primary | ICD-10-CM

## 2021-04-20 LAB
ABDOMINAL CIRCUMFERENCE: NORMAL
BIPARIETAL DIAMETER: NORMAL
ESTIMATED FETAL WEIGHT: NORMAL
FEMORAL DIAMETER: NORMAL
HC/AC: NORMAL
HEAD CIRCUMFERENCE: NORMAL

## 2021-04-20 PROCEDURE — 76818 FETAL BIOPHYS PROFILE W/NST: CPT | Performed by: OBSTETRICS & GYNECOLOGY

## 2021-04-20 PROCEDURE — 76816 OB US FOLLOW-UP PER FETUS: CPT | Performed by: OBSTETRICS & GYNECOLOGY

## 2021-04-20 PROCEDURE — 76821 MIDDLE CEREBRAL ARTERY ECHO: CPT | Performed by: OBSTETRICS & GYNECOLOGY

## 2021-04-20 PROCEDURE — 76820 UMBILICAL ARTERY ECHO: CPT | Performed by: OBSTETRICS & GYNECOLOGY

## 2021-04-20 NOTE — PROGRESS NOTES
Maternal blood work sent for various  infections, including coxsackie virus A and B viruses, CMV, parvovirus B19, toxoplasmosis (IgM and IgG serologies), etc: small fetal head biometry/microcephaly. Patient declined invasive prenatal diagnostic testing (including evaluating for fetal aneuploidy, fetal microdeletions, fetal single gene etiologies, fetal microarray, etc.) today. Patient has opted for non-invasive prenatal diagnosis testing (with Willy Rodas) today.

## 2021-04-21 PROBLEM — O28.8 AFI (AMNIOTIC FLUID INDEX) DECREASED: Status: ACTIVE | Noted: 2021-04-21

## 2021-04-21 PROBLEM — O35.07X0 FETAL MICROCEPHALY: Status: ACTIVE | Noted: 2021-04-21

## 2021-04-22 ENCOUNTER — ROUTINE PRENATAL (OUTPATIENT)
Dept: OBGYN CLINIC | Age: 21
End: 2021-04-22
Payer: COMMERCIAL

## 2021-04-22 VITALS
SYSTOLIC BLOOD PRESSURE: 124 MMHG | DIASTOLIC BLOOD PRESSURE: 80 MMHG | BODY MASS INDEX: 41.8 KG/M2 | HEART RATE: 96 BPM | WEIGHT: 259 LBS

## 2021-04-22 DIAGNOSIS — Z3A.32 32 WEEKS GESTATION OF PREGNANCY: ICD-10-CM

## 2021-04-22 DIAGNOSIS — O99.820 GBS (GROUP B STREPTOCOCCUS CARRIER), +RV CULTURE, CURRENTLY PREGNANT: ICD-10-CM

## 2021-04-22 DIAGNOSIS — O26.899 RH NEGATIVE STATE IN ANTEPARTUM PERIOD: ICD-10-CM

## 2021-04-22 DIAGNOSIS — O43.199 MARGINAL INSERTION OF UMBILICAL CORD AFFECTING MANAGEMENT OF MOTHER: ICD-10-CM

## 2021-04-22 DIAGNOSIS — Z67.91 RH NEGATIVE STATE IN ANTEPARTUM PERIOD: ICD-10-CM

## 2021-04-22 DIAGNOSIS — R89.9 ABNORMAL LABORATORY TEST: ICD-10-CM

## 2021-04-22 DIAGNOSIS — D17.30 LIPOMA OF SKIN AND SUBCUTANEOUS TISSUE: ICD-10-CM

## 2021-04-22 DIAGNOSIS — O28.8 AFI (AMNIOTIC FLUID INDEX) DECREASED: ICD-10-CM

## 2021-04-22 PROCEDURE — 99213 OFFICE O/P EST LOW 20 MIN: CPT | Performed by: NURSE PRACTITIONER

## 2021-04-22 PROCEDURE — 1036F TOBACCO NON-USER: CPT | Performed by: NURSE PRACTITIONER

## 2021-04-22 PROCEDURE — G8427 DOCREV CUR MEDS BY ELIG CLIN: HCPCS | Performed by: NURSE PRACTITIONER

## 2021-04-22 PROCEDURE — G8417 CALC BMI ABV UP PARAM F/U: HCPCS | Performed by: NURSE PRACTITIONER

## 2021-04-22 NOTE — PROGRESS NOTES
Gideon Palma is a 24 y.o. female 31w6d        OB History    Para Term  AB Living   1             SAB TAB Ectopic Molar Multiple Live Births                    # Outcome Date GA Lbr John/2nd Weight Sex Delivery Anes PTL Lv   1 Current                Vitals  BP: 124/80  Weight: 259 lb (117.5 kg)  Pulse: 96  Patient Position: Sitting  Albumin: Negative  Glucose: Negative      The patient was seen and evaluated. There was positive fetal movements. No contractions or leakage of fluid. Signs and symptoms of  labor as well as labor were reviewed. The S/S of Pre-Eclampsia were reviewed with the patient in detail. She is to report any of these if they occur. She currently denies any of these. The patient had her 28 week labs completed.   Hospital Outpatient Visit on 2021   Component Date Value Ref Range Status    GLU ADMN 2021 50G COLA   Corrected    CORRECTED ON  AT 0956: PREVIOUSLY REPORTED AS Glucola    Glucose tolerance screen 50g 2021 123  70 - 135 mg/dL Final   Hospital Outpatient Visit on 2021   Component Date Value Ref Range Status    Product Code 2021 MANUFACTURED PRODUCT   Final    Number of Units 2021 1   Final    ABO/Rh 2021 O NEGATIVE   Final    Antibody Screen 2021 NEGATIVE   Final    History Check 2021 O NEG   Final    Unit Number 2021 [id number]   Final    Product Code 2021 RHIG   Final    Unit Divison 2021 00   Final    Dispense Status 2021 TRANSFUSED   Final    Transfusion Status 2021 OK TO TRANSFUSE   Final   ]    2021  testing with weekly NSTs    2020 81 mg ASA per MFM for the prevention of preeclampsia based on the ACOG/USPSTF guidelines  OBESE    2020: TDAP given by PCP office  Marginal cord insertion  Rh NEGATIVE  1 hr GTT: 123    Lipoma of right labia   GBS POSITIVE (20)      T-Dap Vaccine (27-36 weeks): completed    The patient antepartum flow sheet. Instructed to complete labs     Testing Indicated: Yes  Scheduled with Nursing-Pt notified: Yes      Patient was seen with total face to face time of 15 minutes. More than 50% of this visit was on counseling and education regarding her    Diagnosis Orders   1. 32 weeks gestation of pregnancy     2. Microcephaly of fetus, single or unspecified fetus     3. Marginal insertion of umbilical cord affecting management of mother     4. Lipoma of skin and subcutaneous tissue     5. ANA (amniotic fluid index) decreased     6. Abnormal laboratory test     7. GBS (group B Streptococcus carrier), +RV culture, currently pregnant     8. Rh negative state in antepartum period      and her options. She was also counseled on her preventative health maintenance recommendations and follow-up.

## 2021-04-26 ENCOUNTER — HOSPITAL ENCOUNTER (OUTPATIENT)
Age: 21
Discharge: HOME | End: 2021-04-26
Payer: COMMERCIAL

## 2021-04-26 LAB
CMV IGM: 0.4
CYTOMEGALOVIRUS IGG ANTIBODY: 6.4
SEND OUT REPORT: NORMAL
TEST NAME: NORMAL
TOXOPLASM IGM: 0.21 INDEX
TOXOPLASMA BLOOD FOR RATIO: <0.5 IU/ML

## 2021-04-26 PROCEDURE — 86645 CMV ANTIBODY IGM: CPT

## 2021-04-26 PROCEDURE — 86658 ENTEROVIRUS ANTIBODY: CPT

## 2021-04-26 PROCEDURE — 86778 TOXOPLASMA ANTIBODY IGM: CPT

## 2021-04-26 PROCEDURE — 86644 CMV ANTIBODY: CPT

## 2021-04-26 PROCEDURE — 86747 PARVOVIRUS ANTIBODY: CPT

## 2021-04-26 PROCEDURE — 36415 COLL VENOUS BLD VENIPUNCTURE: CPT

## 2021-04-26 PROCEDURE — 86777 TOXOPLASMA ANTIBODY: CPT

## 2021-04-27 ENCOUNTER — ROUTINE PRENATAL (OUTPATIENT)
Dept: PERINATAL CARE | Age: 21
End: 2021-04-27
Payer: COMMERCIAL

## 2021-04-27 VITALS
HEART RATE: 76 BPM | BODY MASS INDEX: 41.8 KG/M2 | TEMPERATURE: 97.5 F | WEIGHT: 259 LBS | RESPIRATION RATE: 16 BRPM | SYSTOLIC BLOOD PRESSURE: 130 MMHG | DIASTOLIC BLOOD PRESSURE: 77 MMHG

## 2021-04-27 DIAGNOSIS — O99.213 OBESITY AFFECTING PREGNANCY IN THIRD TRIMESTER: ICD-10-CM

## 2021-04-27 DIAGNOSIS — O41.93X0 ABNORMAL AMNIOTIC FLUID IN THIRD TRIMESTER, SINGLE OR UNSPECIFIED FETUS: ICD-10-CM

## 2021-04-27 DIAGNOSIS — Z3A.33 33 WEEKS GESTATION OF PREGNANCY: ICD-10-CM

## 2021-04-27 DIAGNOSIS — O16.3 HYPERTENSION AFFECTING PREGNANCY IN THIRD TRIMESTER: Primary | ICD-10-CM

## 2021-04-27 DIAGNOSIS — O44.23 PLACENTA MARGINALIS IN THIRD TRIMESTER: ICD-10-CM

## 2021-04-27 LAB
PARVOVIRUS B19 IGG ANTIBODY: 4.72 IV
PARVOVIRUS B19 IGM ANTIBODY: 0.24 IV

## 2021-04-27 PROCEDURE — 76818 FETAL BIOPHYS PROFILE W/NST: CPT | Performed by: OBSTETRICS & GYNECOLOGY

## 2021-04-27 PROCEDURE — 76820 UMBILICAL ARTERY ECHO: CPT | Performed by: OBSTETRICS & GYNECOLOGY

## 2021-04-27 PROCEDURE — 76815 OB US LIMITED FETUS(S): CPT | Performed by: OBSTETRICS & GYNECOLOGY

## 2021-04-27 PROCEDURE — 76821 MIDDLE CEREBRAL ARTERY ECHO: CPT | Performed by: OBSTETRICS & GYNECOLOGY

## 2021-04-28 LAB — COXSACKIE A9 AB: NORMAL

## 2021-05-03 LAB
COXSACKIE B1 ANTIBODY: NORMAL
COXSACKIE B2 ANTIBODY: NORMAL
COXSACKIE B3 ANTIBODY: NORMAL
COXSACKIE B4 ANTIBODY: NORMAL
COXSACKIE B5 ANTIBODY: NORMAL
COXSACKIE B6 ANTIBODY: NORMAL

## 2021-05-04 ENCOUNTER — ROUTINE PRENATAL (OUTPATIENT)
Dept: PERINATAL CARE | Age: 21
End: 2021-05-04
Payer: COMMERCIAL

## 2021-05-04 VITALS
BODY MASS INDEX: 41.62 KG/M2 | SYSTOLIC BLOOD PRESSURE: 124 MMHG | HEART RATE: 92 BPM | DIASTOLIC BLOOD PRESSURE: 74 MMHG | WEIGHT: 259 LBS | RESPIRATION RATE: 16 BRPM | HEIGHT: 66 IN | TEMPERATURE: 97.3 F

## 2021-05-04 DIAGNOSIS — O99.213 OBESITY AFFECTING PREGNANCY IN THIRD TRIMESTER: ICD-10-CM

## 2021-05-04 DIAGNOSIS — O44.23 PLACENTA MARGINALIS IN THIRD TRIMESTER: ICD-10-CM

## 2021-05-04 DIAGNOSIS — O16.3 HYPERTENSION AFFECTING PREGNANCY IN THIRD TRIMESTER: Primary | ICD-10-CM

## 2021-05-04 DIAGNOSIS — O41.93X0 ABNORMAL AMNIOTIC FLUID IN THIRD TRIMESTER, SINGLE OR UNSPECIFIED FETUS: ICD-10-CM

## 2021-05-04 DIAGNOSIS — Z3A.34 34 WEEKS GESTATION OF PREGNANCY: ICD-10-CM

## 2021-05-04 PROCEDURE — 76815 OB US LIMITED FETUS(S): CPT | Performed by: OBSTETRICS & GYNECOLOGY

## 2021-05-04 PROCEDURE — 76818 FETAL BIOPHYS PROFILE W/NST: CPT | Performed by: OBSTETRICS & GYNECOLOGY

## 2021-05-04 PROCEDURE — 76820 UMBILICAL ARTERY ECHO: CPT | Performed by: OBSTETRICS & GYNECOLOGY

## 2021-05-04 PROCEDURE — 76821 MIDDLE CEREBRAL ARTERY ECHO: CPT | Performed by: OBSTETRICS & GYNECOLOGY

## 2021-05-07 ENCOUNTER — ROUTINE PRENATAL (OUTPATIENT)
Dept: OBGYN CLINIC | Age: 21
End: 2021-05-07

## 2021-05-07 VITALS
DIASTOLIC BLOOD PRESSURE: 76 MMHG | BODY MASS INDEX: 41.97 KG/M2 | SYSTOLIC BLOOD PRESSURE: 122 MMHG | HEART RATE: 90 BPM | WEIGHT: 260 LBS

## 2021-05-07 DIAGNOSIS — O43.199 MARGINAL INSERTION OF UMBILICAL CORD AFFECTING MANAGEMENT OF MOTHER: ICD-10-CM

## 2021-05-07 DIAGNOSIS — Z3A.35 35 WEEKS GESTATION OF PREGNANCY: Primary | ICD-10-CM

## 2021-05-07 DIAGNOSIS — R89.9 ABNORMAL LABORATORY TEST: ICD-10-CM

## 2021-05-07 DIAGNOSIS — D17.30 LIPOMA OF SKIN AND SUBCUTANEOUS TISSUE: ICD-10-CM

## 2021-05-07 DIAGNOSIS — O28.8 AFI (AMNIOTIC FLUID INDEX) DECREASED: ICD-10-CM

## 2021-05-07 DIAGNOSIS — O99.820 GBS (GROUP B STREPTOCOCCUS CARRIER), +RV CULTURE, CURRENTLY PREGNANT: ICD-10-CM

## 2021-05-07 DIAGNOSIS — O26.899 RH NEGATIVE STATE IN ANTEPARTUM PERIOD: ICD-10-CM

## 2021-05-07 DIAGNOSIS — Z67.91 RH NEGATIVE STATE IN ANTEPARTUM PERIOD: ICD-10-CM

## 2021-05-07 PROCEDURE — 0502F SUBSEQUENT PRENATAL CARE: CPT | Performed by: NURSE PRACTITIONER

## 2021-05-11 ENCOUNTER — ROUTINE PRENATAL (OUTPATIENT)
Dept: PERINATAL CARE | Age: 21
End: 2021-05-11
Payer: COMMERCIAL

## 2021-05-11 ENCOUNTER — ROUTINE PRENATAL (OUTPATIENT)
Dept: OBGYN CLINIC | Age: 21
End: 2021-05-11

## 2021-05-11 VITALS
SYSTOLIC BLOOD PRESSURE: 121 MMHG | BODY MASS INDEX: 41.78 KG/M2 | WEIGHT: 260 LBS | HEART RATE: 98 BPM | RESPIRATION RATE: 16 BRPM | HEIGHT: 66 IN | DIASTOLIC BLOOD PRESSURE: 70 MMHG | TEMPERATURE: 97.2 F

## 2021-05-11 VITALS
DIASTOLIC BLOOD PRESSURE: 82 MMHG | HEART RATE: 89 BPM | BODY MASS INDEX: 42.13 KG/M2 | SYSTOLIC BLOOD PRESSURE: 122 MMHG | WEIGHT: 261 LBS

## 2021-05-11 DIAGNOSIS — O99.213 OBESITY AFFECTING PREGNANCY IN THIRD TRIMESTER: ICD-10-CM

## 2021-05-11 DIAGNOSIS — O35.10X0 CHROMOSOMAL ABNORMALITY IN FETUS, AFFECTING MANAGEMENT OF MOTHER, ANTEPARTUM, SINGLE OR UNSPECIFIED FETUS: Primary | ICD-10-CM

## 2021-05-11 DIAGNOSIS — O41.93X0 ABNORMAL AMNIOTIC FLUID IN THIRD TRIMESTER, SINGLE OR UNSPECIFIED FETUS: ICD-10-CM

## 2021-05-11 DIAGNOSIS — O98.513 VIRAL INFECTION AFFECTING PREGNANCY IN THIRD TRIMESTER: ICD-10-CM

## 2021-05-11 DIAGNOSIS — O26.899 RH NEGATIVE STATE IN ANTEPARTUM PERIOD: ICD-10-CM

## 2021-05-11 DIAGNOSIS — Z36.4 ANTENATAL SCREENING FOR FETAL GROWTH RETARDATION USING ULTRASONICS: ICD-10-CM

## 2021-05-11 DIAGNOSIS — Z3A.35 35 WEEKS GESTATION OF PREGNANCY: ICD-10-CM

## 2021-05-11 DIAGNOSIS — O44.23 PLACENTA MARGINALIS IN THIRD TRIMESTER: ICD-10-CM

## 2021-05-11 DIAGNOSIS — O99.820 GBS (GROUP B STREPTOCOCCUS CARRIER), +RV CULTURE, CURRENTLY PREGNANT: ICD-10-CM

## 2021-05-11 DIAGNOSIS — Z67.91 RH NEGATIVE STATE IN ANTEPARTUM PERIOD: ICD-10-CM

## 2021-05-11 DIAGNOSIS — O16.3 HYPERTENSION AFFECTING PREGNANCY IN THIRD TRIMESTER: ICD-10-CM

## 2021-05-11 DIAGNOSIS — R89.9 ABNORMAL LABORATORY TEST: ICD-10-CM

## 2021-05-11 DIAGNOSIS — D17.30 LIPOMA OF SKIN AND SUBCUTANEOUS TISSUE: ICD-10-CM

## 2021-05-11 DIAGNOSIS — O43.199 MARGINAL INSERTION OF UMBILICAL CORD AFFECTING MANAGEMENT OF MOTHER: ICD-10-CM

## 2021-05-11 DIAGNOSIS — O28.8 AFI (AMNIOTIC FLUID INDEX) DECREASED: Primary | ICD-10-CM

## 2021-05-11 PROCEDURE — 76821 MIDDLE CEREBRAL ARTERY ECHO: CPT | Performed by: OBSTETRICS & GYNECOLOGY

## 2021-05-11 PROCEDURE — G8428 CUR MEDS NOT DOCUMENT: HCPCS | Performed by: OBSTETRICS & GYNECOLOGY

## 2021-05-11 PROCEDURE — 76820 UMBILICAL ARTERY ECHO: CPT | Performed by: OBSTETRICS & GYNECOLOGY

## 2021-05-11 PROCEDURE — G8417 CALC BMI ABV UP PARAM F/U: HCPCS | Performed by: OBSTETRICS & GYNECOLOGY

## 2021-05-11 PROCEDURE — 76816 OB US FOLLOW-UP PER FETUS: CPT | Performed by: OBSTETRICS & GYNECOLOGY

## 2021-05-11 PROCEDURE — 0502F SUBSEQUENT PRENATAL CARE: CPT | Performed by: OBSTETRICS & GYNECOLOGY

## 2021-05-11 PROCEDURE — 99243 OFF/OP CNSLTJ NEW/EST LOW 30: CPT | Performed by: OBSTETRICS & GYNECOLOGY

## 2021-05-11 PROCEDURE — 76818 FETAL BIOPHYS PROFILE W/NST: CPT | Performed by: OBSTETRICS & GYNECOLOGY

## 2021-05-11 NOTE — PROGRESS NOTES
Iman Betancur is a 24 y.o. female 29w2d        OB History    Para Term  AB Living   1             SAB TAB Ectopic Molar Multiple Live Births                    # Outcome Date GA Lbr John/2nd Weight Sex Delivery Anes PTL Lv   1 Current                Vitals  BP: 122/82  Weight: 261 lb (118.4 kg)  Pulse: 89  Patient Position: Sitting  Albumin: Negative  Glucose: Negative      The patient was seen and evaluated. There was positive fetal movements. No contractions or leakage of fluid. Signs and symptoms of  labor as well as labor were reviewed. The S/S of Pre-Eclampsia were reviewed with the patient in detail. She is to report any of these if they occur. She currently denies any of these. The patient had her 28 week labs completed. Hospital Outpatient Visit on 2021   Component Date Value Ref Range Status    Coxsackie A9 Ab 2021 1:8  <1:8 Final    Comment: (NOTE)  INTERPRETIVE INFORMATION: Coxsackie A Serotype 9 Titer  Single positive antibody titers of greater than 1:32 may indicate   past or current infection. Seroconversion or an increase in titers   between acute and convalescent sera of at least fourfold is   considered strong evidence of current or recent infection. Performed By: [facility]  [facility] [address]  [place], [address] [facility]  : Sharif Arroyo. Efren Brown MD      Coxsackie B1 Ab 2021 <1:10  <1:10 Final    Coxsackie B2 Ab 2021 1:40  <1:10 Final    Coxsackie B3 Ab 2021 1:160  <1:10 Final    Coxsackie B4 Ab 2021 <1:10  <1:10 Final    Coxsackie B5 Ab 2021 1:10  <1:10 Final    Coxsackie B6 Ab 2021 <1:10  <1:10 Final    Comment: (NOTE)  INTERPRETIVE INFORMATION: Coxsackie B Virus  Single positive antibody titers of greater than or equal  to 1:80 may indicate past or current infection.  Sero-  conversion or an increase in titers between acute and  convalescent sera of at least fourfold is considered  strong evidence of current or recent infection. Performed By: Ruben Mcmullen [address]  [place], [address] [facility]  : Misael Higgins. Franky Tinajero MD      Parvovirus B19 IgG 04/26/2021 4.72* <=0.90 IV Final    Comment: (NOTE)  INTERPRETIVE INFORMATION: Parvovirus B19 Antibody, IgG   0.90 IV or less . ......... Negative - No significant                              level of detectable Parvovirus                              B19 IgG antibody. 0.91 - 1.09 IV . .......... Equivocal - Repeat testing in                              7-21 days may be helpful. 1.10 IV or greater . ...... Positive - IgG antibody to                              Parvovirus B19 detected which                              may indicate a current or                              past infection. The best evidence for current infection is a significant change on   two appropriately timed specimens, where both tests are done in   the same laboratory at the same time.  Parvovirus B19 IgM 04/26/2021 0.24  <=0.90 IV Final    Comment: (NOTE)  INTERPRETIVE INFORMATION: Parvovirus B19 Antibody, IgM   0.90 IV or less . ......... Negative - No significant                              level of detectable Parvovirus                              B19 IgM antibody. 0.91 - 1.09 IV . .......... Equivocal - Repeat testing in                              7-21 days may be helpful. 1.10 IV or greater . ....... Positive - IgM antibody to                              Parvovirus B19 detected which                              may indicate a current or                              recent infection. However, low                              levels of IgM antibodies may                              occasionally persist for more                              than 12 months post-infection.   The best evidence for current infection is a significant change on   two appropriately timed specimens, where both tests are done in   the same laboratory at the same time.  Appearance of an IgM antibody response normally occurs 7 to 14   days after th                           e onset of disease. Testing immediately post-exposure   is of no value without a later convalescent specimen. A residual   IgM response may be distinguished from early IgM response to   infection by testing sera from patients three to four weeks later   for changing levels of specific IgM antibodies. Performed By: Naima Mcmullen [address]  [place], [address] [facility]  : Zachery Muñoz. [address] Test Name 04/26/2021 RSOMERY   Final    Send Out Report 04/26/2021 FORWARD TO Julio Edward 04/26/2021   Final    CMV IgG 04/26/2021 6.4* <0.9 Final    Comment:    Reference Range:  <0.9       Non Reactive  0.9 to 1.0 Indeterminate  >1.0       Reactive     The absence of CMV antibodies suggests that the patient has not been exposed to the virus   and is susceptible to primary infection. The presence of CMV IgG antibodies is indicative of previous exposure to the virus, but   cannot distinguish between active or past infection. Patients suspected of having primary or active infection should be tested for the concurrent   presence of CMV IgM antibodies and/or retested for seroconversion in 3 to 4 weeks. These results are not intended to replace virus isolation and should be interpreted within   the context of clinical and other findings.  CMV IgM 04/26/2021 0.4  <0.9 Final    Comment:    Reference Range:  <0.9       Non Reactive  0.9 to 1.0 Indeterminate  >1.0       Reactive     The absence of CMV antibodies suggests that the patient has not been exposed to the virus   and is susceptible to primary infection. The presence of CMV IgM antibodies is indicative of recent exposure to the virus. These results are not intended to replace virus isolation and should be interpreted within   the context of clinical and other findings.       Toxoplasma IgG 04/26/2021 <0.5  IU/mL Final Comment:             REFERENCE RANGE:  <6.3             NON-REACTIVE  6.4 TO 9.9       EQUIVOCAL  >=10.0           REACTIVE        THE PRESENCE OF TOXOPLASMA GONDII IgG ANTIBODIES IS INDICATIVE OF EXPOSURE TO THE PROTOZOAN. THE ABSENCE OF TOXOPLASMA IgG ANTIBODIES SUGGESTS THAT THE PATIENT HAS NOT BEEN EXPOSED TO   THIS ORGANISM AND IS SUSCEPTIBLE TO PRIMARY INFECTION. DETERMINATION OF PRIMARY OR RECENT   INFECTION REQUIRES DEMONSTRATING SEROCONVERSION BETWEEN ACUTE AND CONVALESCENT SERA.  Toxoplasm IgM 2021 0.21  Index Final    Comment:             REFERENCE RANGE:  <0.90            NON-REACTIVE  0.90 TO  0.99    INDETERMINANT  >=1.00           REACTIVE           ]    2021  testing with weekly NSTs    2020 81 mg ASA per [facility] for the prevention of preeclampsia based on the ACOG/USPSTF guidelines  OBESE    2020: TDAP given by PCP office  Marginal cord insertion  Rh NEGATIVE  1 hr GTT: 123    Lipoma of right labia   GBS POSITIVE (20)      T-Dap Vaccine (27-36 weeks): completed    The patient was instructed on fetal kick counts and was given a kick sheet to complete every 8 hours. She was instructed that the baby should move at a minimum of ten times within one hour after a meal. The patient was instructed to lay down on her left side twenty minutes after eating and count movements for up to one hour with a target value of ten movements. She was instructed to notify the office if she did not make that target after two attempts or if after any attempt there was less than four movements. The patient reports that the targets have been made Yes.  Testing:  Weekly at [facility] d/t chronic HTN/ decreased amniotic fluid volume    Assessment:  1Nelson Westbrook is a 24 y.o. female  2.    3. 35w4d    Patient Active Problem List    Diagnosis Date Noted    Fetal microcephaly 2021     Priority: High    Marginal insertion of umbilical cord affecting management of

## 2021-05-18 ENCOUNTER — ROUTINE PRENATAL (OUTPATIENT)
Dept: PERINATAL CARE | Age: 21
End: 2021-05-18
Payer: COMMERCIAL

## 2021-05-18 ENCOUNTER — ROUTINE PRENATAL (OUTPATIENT)
Dept: OBGYN CLINIC | Age: 21
End: 2021-05-18
Payer: COMMERCIAL

## 2021-05-18 VITALS
DIASTOLIC BLOOD PRESSURE: 84 MMHG | WEIGHT: 265 LBS | BODY MASS INDEX: 42.59 KG/M2 | HEART RATE: 98 BPM | RESPIRATION RATE: 16 BRPM | HEIGHT: 66 IN | SYSTOLIC BLOOD PRESSURE: 124 MMHG | TEMPERATURE: 97.2 F

## 2021-05-18 VITALS
BODY MASS INDEX: 41.97 KG/M2 | DIASTOLIC BLOOD PRESSURE: 80 MMHG | SYSTOLIC BLOOD PRESSURE: 124 MMHG | HEART RATE: 96 BPM | WEIGHT: 260 LBS

## 2021-05-18 DIAGNOSIS — R89.9 ABNORMAL LABORATORY TEST: ICD-10-CM

## 2021-05-18 DIAGNOSIS — Z3A.36 36 WEEKS GESTATION OF PREGNANCY: ICD-10-CM

## 2021-05-18 DIAGNOSIS — O98.513 VIRAL INFECTION AFFECTING PREGNANCY IN THIRD TRIMESTER: ICD-10-CM

## 2021-05-18 DIAGNOSIS — O26.899 RH NEGATIVE STATE IN ANTEPARTUM PERIOD: ICD-10-CM

## 2021-05-18 DIAGNOSIS — O44.23 PLACENTA MARGINALIS IN THIRD TRIMESTER: ICD-10-CM

## 2021-05-18 DIAGNOSIS — O99.820 GBS (GROUP B STREPTOCOCCUS CARRIER), +RV CULTURE, CURRENTLY PREGNANT: ICD-10-CM

## 2021-05-18 DIAGNOSIS — O43.199 MARGINAL INSERTION OF UMBILICAL CORD AFFECTING MANAGEMENT OF MOTHER: ICD-10-CM

## 2021-05-18 DIAGNOSIS — D17.30 LIPOMA OF SKIN AND SUBCUTANEOUS TISSUE: ICD-10-CM

## 2021-05-18 DIAGNOSIS — O41.93X0 ABNORMAL AMNIOTIC FLUID IN THIRD TRIMESTER, SINGLE OR UNSPECIFIED FETUS: ICD-10-CM

## 2021-05-18 DIAGNOSIS — O35.10X0 CHROMOSOMAL ABNORMALITY IN FETUS, AFFECTING MANAGEMENT OF MOTHER, ANTEPARTUM, SINGLE OR UNSPECIFIED FETUS: Primary | ICD-10-CM

## 2021-05-18 DIAGNOSIS — O16.3 HYPERTENSION AFFECTING PREGNANCY IN THIRD TRIMESTER: ICD-10-CM

## 2021-05-18 DIAGNOSIS — Z67.91 RH NEGATIVE STATE IN ANTEPARTUM PERIOD: ICD-10-CM

## 2021-05-18 DIAGNOSIS — O99.213 OBESITY AFFECTING PREGNANCY IN THIRD TRIMESTER: ICD-10-CM

## 2021-05-18 DIAGNOSIS — O28.8 AFI (AMNIOTIC FLUID INDEX) DECREASED: Primary | ICD-10-CM

## 2021-05-18 PROCEDURE — 1036F TOBACCO NON-USER: CPT | Performed by: OBSTETRICS & GYNECOLOGY

## 2021-05-18 PROCEDURE — 99213 OFFICE O/P EST LOW 20 MIN: CPT | Performed by: OBSTETRICS & GYNECOLOGY

## 2021-05-18 PROCEDURE — 93325 DOPPLER ECHO COLOR FLOW MAPG: CPT | Performed by: OBSTETRICS & GYNECOLOGY

## 2021-05-18 PROCEDURE — 76815 OB US LIMITED FETUS(S): CPT | Performed by: OBSTETRICS & GYNECOLOGY

## 2021-05-18 PROCEDURE — 76820 UMBILICAL ARTERY ECHO: CPT | Performed by: OBSTETRICS & GYNECOLOGY

## 2021-05-18 PROCEDURE — G8427 DOCREV CUR MEDS BY ELIG CLIN: HCPCS | Performed by: OBSTETRICS & GYNECOLOGY

## 2021-05-18 PROCEDURE — 76821 MIDDLE CEREBRAL ARTERY ECHO: CPT | Performed by: OBSTETRICS & GYNECOLOGY

## 2021-05-18 PROCEDURE — G8417 CALC BMI ABV UP PARAM F/U: HCPCS | Performed by: OBSTETRICS & GYNECOLOGY

## 2021-05-18 PROCEDURE — 76827 ECHO EXAM OF FETAL HEART: CPT | Performed by: OBSTETRICS & GYNECOLOGY

## 2021-05-18 PROCEDURE — 76825 ECHO EXAM OF FETAL HEART: CPT | Performed by: OBSTETRICS & GYNECOLOGY

## 2021-05-18 PROCEDURE — 76818 FETAL BIOPHYS PROFILE W/NST: CPT | Performed by: OBSTETRICS & GYNECOLOGY

## 2021-05-18 NOTE — PROGRESS NOTES
Iman Betancur is a 24 y.o. female 37w2d        OB History    Para Term  AB Living   1             SAB TAB Ectopic Molar Multiple Live Births                    # Outcome Date GA Lbr John/2nd Weight Sex Delivery Anes PTL Lv   1 Current                  Vitals  BP: 124/80  Weight: 260 lb (117.9 kg)  Pulse: 96  Patient Position: Sitting  Albumin: Negative  Glucose: Negative      The patient was seen and evaluated. There was positive fetal movements. No contractions or leakage of fluid. Signs and symptoms of labor were reviewed. The S/S of Pre-Eclampsia were reviewed with the patient in detail. She is to report any of these if they occur. She currently denies any of these. The patient was instructed on fetal kick counts and was given a kick sheet to complete every 8 hours. She was instructed that the baby should move at a minimum of ten times within one hour after a meal. The patient was instructed to lay down on her left side twenty minutes after eating and count movements for up to one hour with a target value of ten movements. She was instructed to notify the office if she did not make that target after two attempts or if after any attempt there was less than four movements. The patient reports that the targets have been made Yes.    2021  testing with weekly NSTs    2020 81 mg ASA per MFM for the prevention of preeclampsia based on the ACOG/USPSTF guidelines  OBESE    2020: TDAP given by PCP office  Marginal cord insertion  Rh NEGATIVE  1 hr GTT: 123    Lipoma of right labia   GBS POSITIVE (20)      T-Dap Vaccine (27-36 weeks) Completed: Completed     Allergies: Allergies as of 2021    (No Known Allergies)       Group Beta Strep collection was completed. Yes   GBS Results:   No visits with results within 1 Week(s) from this visit.    Latest known visit with results is:   Hospital Outpatient Visit on 2021   Component Date Value Ref Range Status    Coxsackie A9 Ab 04/26/2021 1:8  <1:8 Final    Comment: (NOTE)  INTERPRETIVE INFORMATION: Coxsackie A Serotype 9 Titer  Single positive antibody titers of greater than 1:32 may indicate   past or current infection. Seroconversion or an increase in titers   between acute and convalescent sera of at least fourfold is   considered strong evidence of current or recent infection. Performed By: [address], [address] [place] [address]  : Estefanybee Ly. Anitha Watters MD      Coxsackie B1 Ab 04/26/2021 <1:10  <1:10 Final    Coxsackie B2 Ab 04/26/2021 1:40  <1:10 Final    Coxsackie B3 Ab 04/26/2021 1:160  <1:10 Final    Coxsackie B4 Ab 04/26/2021 <1:10  <1:10 Final    Coxsackie B5 Ab 04/26/2021 1:10  <1:10 Final    Coxsackie B6 Ab 04/26/2021 <1:10  <1:10 Final    Comment: (NOTE)  INTERPRETIVE INFORMATION: Coxsackie B Virus  Single positive antibody titers of greater than or equal  to 1:80 may indicate past or current infection. Sero-  conversion or an increase in titers between acute and  convalescent sera of at least fourfold is considered  strong evidence of current or recent infection. Performed By: [address], [address] [place] [address]  : Estefany Ly. Anitha Watters MD      Parvovirus B19 IgG 04/26/2021 4.72* <=0.90 IV Final    Comment: (NOTE)  INTERPRETIVE INFORMATION: Parvovirus B19 Antibody, IgG   0.90 IV or less . ......... Negative - No significant                              level of detectable Parvovirus                              B19 IgG antibody. 0.91 - 1.09 IV . .......... Equivocal - Repeat testing in                              7-21 days may be helpful. 1.10 IV or greater . ...... Positive - IgG antibody to                              Parvovirus B19 detected which                              may indicate a current or                              past infection.   The best evidence for current infection is a significant change on   two appropriately timed specimens, where both tests are done in   the same laboratory at the same time.  Parvovirus B19 IgM 04/26/2021 0.24  <=0.90 IV Final    Comment: (NOTE)  INTERPRETIVE INFORMATION: Parvovirus B19 Antibody, IgM   0.90 IV or less . ......... Negative - No significant                              level of detectable Parvovirus                              B19 IgM antibody. 0.91 - 1.09 IV . .......... Equivocal - Repeat testing in                              7-21 days may be helpful. 1.10 IV or greater . ....... Positive - IgM antibody to                              Parvovirus B19 detected which                              may indicate a current or                              recent infection. However, low                              levels of IgM antibodies may                              occasionally persist for more                              than 12 months post-infection. The best evidence for current infection is a significant change on   two appropriately timed specimens, where both tests are done in   the same laboratory at the same time. Appearance of an IgM antibody response normally occurs 7 to 14   days after th                           e onset of disease. Testing immediately post-exposure   is of no value without a later convalescent specimen. A residual   IgM response may be distinguished from early IgM response to   infection by testing sera from patients three to four weeks later   for changing levels of specific IgM antibodies. Performed By: Basil Mcmullen [address]  [place], [address] [facility]  : Barry Monahan.  [address] Test Name 04/26/2021 ROSMERY   Final    Send Out Report 04/26/2021 FORWARD TO Joanne Hancock 04/26/2021   Final    CMV IgG 04/26/2021 6.4* <0.9 Final    Comment:    Reference Range:  <0.9       Non Reactive  0.9 to 1.0 Indeterminate  >1.0       Reactive     The absence of CMV antibodies suggests that the patient has not been exposed to the virus   and is susceptible to primary infection. The presence of CMV IgG antibodies is indicative of previous exposure to the virus, but   cannot distinguish between active or past infection. Patients suspected of having primary or active infection should be tested for the concurrent   presence of CMV IgM antibodies and/or retested for seroconversion in 3 to 4 weeks. These results are not intended to replace virus isolation and should be interpreted within   the context of clinical and other findings.  CMV IgM 04/26/2021 0.4  <0.9 Final    Comment:    Reference Range:  <0.9       Non Reactive  0.9 to 1.0 Indeterminate  >1.0       Reactive     The absence of CMV antibodies suggests that the patient has not been exposed to the virus   and is susceptible to primary infection. The presence of CMV IgM antibodies is indicative of recent exposure to the virus. These results are not intended to replace virus isolation and should be interpreted within   the context of clinical and other findings.  Toxoplasma IgG 04/26/2021 <0.5  IU/mL Final    Comment:             REFERENCE RANGE:  <6.3             NON-REACTIVE  6.4 TO 9.9       EQUIVOCAL  >=10.0           REACTIVE        THE PRESENCE OF TOXOPLASMA GONDII IgG ANTIBODIES IS INDICATIVE OF EXPOSURE TO THE PROTOZOAN. THE ABSENCE OF TOXOPLASMA IgG ANTIBODIES SUGGESTS THAT THE PATIENT HAS NOT BEEN EXPOSED TO   THIS ORGANISM AND IS SUSCEPTIBLE TO PRIMARY INFECTION. DETERMINATION OF PRIMARY OR RECENT   INFECTION REQUIRES DEMONSTRATING SEROCONVERSION BETWEEN ACUTE AND CONVALESCENT SERA.  Toxoplasm IgM 04/26/2021 0.21  Index Final    Comment:             REFERENCE RANGE:  <0.90            NON-REACTIVE  0.90 TO  0.99    INDETERMINANT  >=1.00           REACTIVE           ]  Sensitivities for clindamycin and erythromycin were ordered. No      The patient was counseled on the mandatory call ahead policy.  She has been instructed to call the currently pregnant     6. Rh negative state in antepartum period     7. Microcephaly of fetus, single or unspecified fetus     8. 36 weeks gestation of pregnancy  Culture, Strep B Screen, Vaginal/Rectal             Plan:  The patient will return to the office for her next visit in 1 weeks. See antepartum flow sheet. Patient was seen with total face to face time of 20 minutes. More than 50% of this visit was on counseling and education regarding her    Diagnosis Orders   1. ANA (amniotic fluid index) decreased     2. Marginal insertion of umbilical cord affecting management of mother     3. Lipoma of skin and subcutaneous tissue     4. Abnormal laboratory test     5. GBS (group B Streptococcus carrier), +RV culture, currently pregnant     6. Rh negative state in antepartum period     7. Microcephaly of fetus, single or unspecified fetus     8. 36 weeks gestation of pregnancy  Culture, Strep B Screen, Vaginal/Rectal    and her options. She was also counseled on her preventative health maintenance recommendations and follow-up.

## 2021-05-19 PROBLEM — R89.9 ABNORMAL LABORATORY TEST RESULT: Status: ACTIVE | Noted: 2021-05-19

## 2021-05-24 DIAGNOSIS — Z3A.36 36 WEEKS GESTATION OF PREGNANCY: ICD-10-CM

## 2021-05-25 ENCOUNTER — ROUTINE PRENATAL (OUTPATIENT)
Dept: PERINATAL CARE | Age: 21
End: 2021-05-25
Payer: COMMERCIAL

## 2021-05-25 ENCOUNTER — ROUTINE PRENATAL (OUTPATIENT)
Dept: OBGYN CLINIC | Age: 21
End: 2021-05-25
Payer: COMMERCIAL

## 2021-05-25 VITALS
SYSTOLIC BLOOD PRESSURE: 132 MMHG | HEART RATE: 86 BPM | TEMPERATURE: 97.2 F | BODY MASS INDEX: 43.42 KG/M2 | WEIGHT: 269 LBS | DIASTOLIC BLOOD PRESSURE: 82 MMHG | RESPIRATION RATE: 20 BRPM

## 2021-05-25 VITALS
WEIGHT: 269 LBS | DIASTOLIC BLOOD PRESSURE: 82 MMHG | HEART RATE: 99 BPM | SYSTOLIC BLOOD PRESSURE: 122 MMHG | BODY MASS INDEX: 43.42 KG/M2

## 2021-05-25 DIAGNOSIS — O28.8 AFI (AMNIOTIC FLUID INDEX) DECREASED: Primary | ICD-10-CM

## 2021-05-25 DIAGNOSIS — Z3A.37 37 WEEKS GESTATION OF PREGNANCY: ICD-10-CM

## 2021-05-25 DIAGNOSIS — R89.9 ABNORMAL LABORATORY TEST: ICD-10-CM

## 2021-05-25 DIAGNOSIS — O16.3 HYPERTENSION AFFECTING PREGNANCY IN THIRD TRIMESTER: Primary | ICD-10-CM

## 2021-05-25 DIAGNOSIS — O35.10X0 CHROMOSOMAL ABNORMALITY IN FETUS, AFFECTING MANAGEMENT OF MOTHER, ANTEPARTUM, SINGLE OR UNSPECIFIED FETUS: ICD-10-CM

## 2021-05-25 DIAGNOSIS — R89.9 ABNORMAL LABORATORY TEST RESULT: ICD-10-CM

## 2021-05-25 DIAGNOSIS — O99.820 GBS (GROUP B STREPTOCOCCUS CARRIER), +RV CULTURE, CURRENTLY PREGNANT: ICD-10-CM

## 2021-05-25 DIAGNOSIS — O44.23 PLACENTA MARGINALIS IN THIRD TRIMESTER: ICD-10-CM

## 2021-05-25 DIAGNOSIS — Z67.91 RH NEGATIVE STATE IN ANTEPARTUM PERIOD: ICD-10-CM

## 2021-05-25 DIAGNOSIS — O26.899 RH NEGATIVE STATE IN ANTEPARTUM PERIOD: ICD-10-CM

## 2021-05-25 DIAGNOSIS — O43.199 MARGINAL INSERTION OF UMBILICAL CORD AFFECTING MANAGEMENT OF MOTHER: ICD-10-CM

## 2021-05-25 DIAGNOSIS — D17.30 LIPOMA OF SKIN AND SUBCUTANEOUS TISSUE: ICD-10-CM

## 2021-05-25 DIAGNOSIS — O99.213 OBESITY AFFECTING PREGNANCY IN THIRD TRIMESTER: ICD-10-CM

## 2021-05-25 DIAGNOSIS — O41.93X0 ABNORMAL AMNIOTIC FLUID IN THIRD TRIMESTER, SINGLE OR UNSPECIFIED FETUS: ICD-10-CM

## 2021-05-25 PROCEDURE — 76820 UMBILICAL ARTERY ECHO: CPT | Performed by: OBSTETRICS & GYNECOLOGY

## 2021-05-25 PROCEDURE — 76815 OB US LIMITED FETUS(S): CPT | Performed by: OBSTETRICS & GYNECOLOGY

## 2021-05-25 PROCEDURE — G8427 DOCREV CUR MEDS BY ELIG CLIN: HCPCS | Performed by: OBSTETRICS & GYNECOLOGY

## 2021-05-25 PROCEDURE — 1036F TOBACCO NON-USER: CPT | Performed by: OBSTETRICS & GYNECOLOGY

## 2021-05-25 PROCEDURE — 76821 MIDDLE CEREBRAL ARTERY ECHO: CPT | Performed by: OBSTETRICS & GYNECOLOGY

## 2021-05-25 PROCEDURE — 99213 OFFICE O/P EST LOW 20 MIN: CPT | Performed by: OBSTETRICS & GYNECOLOGY

## 2021-05-25 PROCEDURE — 76818 FETAL BIOPHYS PROFILE W/NST: CPT | Performed by: OBSTETRICS & GYNECOLOGY

## 2021-05-25 PROCEDURE — G8417 CALC BMI ABV UP PARAM F/U: HCPCS | Performed by: OBSTETRICS & GYNECOLOGY

## 2021-05-25 NOTE — PROGRESS NOTES
Macario Max is a 24 y.o. female 37w1d        OB History    Para Term  AB Living   1             SAB TAB Ectopic Molar Multiple Live Births                    # Outcome Date GA Lbr John/2nd Weight Sex Delivery Anes PTL Lv   1 Current                Vitals  BP: 122/82  Weight: 269 lb (122 kg)  Pulse: 99  Patient Position: Sitting  Albumin: Negative  Glucose: Negative      The patient was seen and evaluated. There was positive fetal movements. No contractions or leakage of fluid. Signs and symptoms of labor were reviewed. The S/S of Pre-Eclampsia were reviewed with the patient in detail. She is to report any of these if they occur. She currently denies any of these. The patient was instructed on fetal kick counts and was given a kick sheet to complete every 8 hours. She was instructed that the baby should move at a minimum of ten times within one hour after a meal. The patient was instructed to lay down on her left side twenty minutes after eating and count movements for up to one hour with a target value of ten movements. She was instructed to notify the office if she did not make that target after two attempts or if after any attempt there was less than four movements. The patient reports that the targets have been made Yes.    2021  testing with weekly NSTs    2020 81 mg ASA per MFM for the prevention of preeclampsia based on the ACOG/USPSTF guidelines  OBESE    2020: TDAP given by PCP office  Marginal cord insertion  Rh NEGATIVE  1 hr GTT: 123    LIPOMA OF RIGHT LABIA saw GYN/Oncology schedules for resection after delivery. Pt wishes attempted vaginal delivery  GBS POSITIVE (20)      T-Dap Vaccine Completed (27-36 weeks): Completed     Allergies: Allergies as of 2021    (No Known Allergies)         Group Beta Strep collection was completed. Yes  GBS Results:   No visits with results within 3 Week(s) from this visit.    Latest known visit Indeterminate  >1.0       Reactive     The absence of CMV antibodies suggests that the patient has not been exposed to the virus   and is susceptible to primary infection. The presence of CMV IgG antibodies is indicative of previous exposure to the virus, but   cannot distinguish between active or past infection. Patients suspected of having primary or active infection should be tested for the concurrent   presence of CMV IgM antibodies and/or retested for seroconversion in 3 to 4 weeks. These results are not intended to replace virus isolation and should be interpreted within   the context of clinical and other findings.  CMV IgM 04/26/2021 0.4  <0.9 Final    Comment:    Reference Range:  <0.9       Non Reactive  0.9 to 1.0 Indeterminate  >1.0       Reactive     The absence of CMV antibodies suggests that the patient has not been exposed to the virus   and is susceptible to primary infection. The presence of CMV IgM antibodies is indicative of recent exposure to the virus. These results are not intended to replace virus isolation and should be interpreted within   the context of clinical and other findings.  Toxoplasma IgG 04/26/2021 <0.5  IU/mL Final    Comment:             REFERENCE RANGE:  <6.3             NON-REACTIVE  6.4 TO 9.9       EQUIVOCAL  >=10.0           REACTIVE        THE PRESENCE OF TOXOPLASMA GONDII IgG ANTIBODIES IS INDICATIVE OF EXPOSURE TO THE PROTOZOAN. THE ABSENCE OF TOXOPLASMA IgG ANTIBODIES SUGGESTS THAT THE PATIENT HAS NOT BEEN EXPOSED TO   THIS ORGANISM AND IS SUSCEPTIBLE TO PRIMARY INFECTION. DETERMINATION OF PRIMARY OR RECENT   INFECTION REQUIRES DEMONSTRATING SEROCONVERSION BETWEEN ACUTE AND CONVALESCENT SERA.       Toxoplasm IgM 04/26/2021 0.21  Index Final    Comment:             REFERENCE RANGE:  <0.90            NON-REACTIVE  0.90 TO  0.99    INDETERMINANT  >=1.00           REACTIVE           ]        Cervical Exam was:   1/60%/-1/V/I cm dilated    The literature regarding a questionable link to pitocin augmentation and induction of labor, the assistance of labor contractions and the initiation of contractions to help delivery, have been reviewed with the patient regarding the increased potential of having a  with Attention Deficit Hyperactivity Disorder and or Autism. These two disorders and the ramifications of their impact on a child and the family caring for that child has been reviewed with the patient in detail. She was given the risks, benefits and alternatives of the use of this medication. She has agreed to its use in the delivery of her unborn child if needed at the time of delivery, Yes. The patient was counseled on the mandatory call ahead policy. She has been instructed to call the office at anytime prior to going into the hospital so the on-call physician may direct her to the appropriate facility for care. Exceptions were reviewed including but not limited to: Decreased fetal movement, vaginal Bleeding or hemorrhage, trauma, readily expectant delivery, or any instance where she feels 911 should be utilized. The patient was counseled on Labor & Delivery. Route of delivery and counseling on vaginal, operative vaginal, and  sections were completed with the risks of each to both the patient as well as her baby. The possibility of a blood transfusion was discussed as well. The patient was not opposed to receiving a transfusion if needed. The patient was counseled on types of analgesia during labor and is considering either Regional or IV medication the risks, benefits and alternatives were discussed.  Testing:  [facility] today        Assessment:  1[place] is a 24 y.o. female  2.    3. 37w4d    Patient Active Problem List    Diagnosis Date Noted    Positive Coxsackie B virus serologies 2021     Priority: High    Fetal microcephaly 2021     Priority: High    Marginal insertion of umbilical cord affecting management of mother 01/27/2021     Priority: High    Lipoma of Vulva/Right Labia 12/03/2020     Priority: High     Overview Note:     GYN ONC Referral      ANA (amniotic fluid index) decreased 04/21/2021     Overview Note:     Follow up in 1 week      ASA 81 mg/dy per MFM 12/03/2020    GBS (group B Streptococcus carrier), +RV culture, currently pregnant 11/23/2020     Overview Note:     11/23/2020 + GBS- treat in labor per protocol      Rh negative state in antepartum period 11/18/2020     Overview Note:     Needs rhogam at 28 weeks          Diagnosis Orders   1. ANA (amniotic fluid index) decreased     2. Microcephaly of fetus, single or unspecified fetus     3. Marginal insertion of umbilical cord affecting management of mother     4. Lipoma of skin and subcutaneous tissue     5. GBS (group B Streptococcus carrier), +RV culture, currently pregnant     6. Rh negative state in antepartum period     7. Abnormal laboratory test     8. Abnormal laboratory test result     9. 37 weeks gestation of pregnancy             Plan:  The patient will return to the office for her next visit in 2-3 weeks for postpartum check. See antepartum flow sheet. Pt scheduled for IOL 6/1/2021 d/t chronic HTN no meds. Risks/ benefits/ alternative options reviewed    Patient was seen with total face to face time of 20 minutes. More than 50% of this visit was on counseling and education regarding her    Diagnosis Orders   1. ANA (amniotic fluid index) decreased     2. Microcephaly of fetus, single or unspecified fetus     3. Marginal insertion of umbilical cord affecting management of mother     4. Lipoma of skin and subcutaneous tissue     5. GBS (group B Streptococcus carrier), +RV culture, currently pregnant     6. Rh negative state in antepartum period     7. Abnormal laboratory test     8. Abnormal laboratory test result     9. 37 weeks gestation of pregnancy      and her options.  She was also counseled on her preventative health maintenance recommendations and follow-up.

## 2021-05-26 ENCOUNTER — HOSPITAL ENCOUNTER (INPATIENT)
Age: 21
LOS: 2 days | Discharge: HOME | End: 2021-05-28
Attending: OBSTETRICS & GYNECOLOGY | Admitting: OBSTETRICS & GYNECOLOGY
Payer: COMMERCIAL

## 2021-05-26 ENCOUNTER — ANESTHESIA EVENT (OUTPATIENT)
Dept: LABOR AND DELIVERY | Age: 21
End: 2021-05-26
Payer: COMMERCIAL

## 2021-05-26 ENCOUNTER — ANESTHESIA (OUTPATIENT)
Dept: LABOR AND DELIVERY | Age: 21
End: 2021-05-26
Payer: COMMERCIAL

## 2021-05-26 PROBLEM — Z3A.37 37 WEEKS GESTATION OF PREGNANCY: Status: ACTIVE | Noted: 2021-05-26

## 2021-05-26 LAB
-: NORMAL
ABO/RH: NORMAL
ABSOLUTE EOS #: 0.06 K/UL (ref 0–0.44)
ABSOLUTE IMMATURE GRANULOCYTE: 0.18 K/UL (ref 0–0.3)
ABSOLUTE LYMPH #: 1.91 K/UL (ref 1.1–3.7)
ABSOLUTE MONO #: 0.83 K/UL (ref 0.1–1.4)
ALBUMIN SERPL-MCNC: 3.7 G/DL (ref 3.5–5.2)
ALBUMIN/GLOBULIN RATIO: 1.5 (ref 1–2.5)
ALP BLD-CCNC: 99 U/L (ref 35–104)
ALT SERPL-CCNC: 17 U/L (ref 5–33)
AMORPHOUS: NORMAL
AMPHETAMINE SCREEN URINE: NEGATIVE
ANION GAP SERPL CALCULATED.3IONS-SCNC: 13 MMOL/L (ref 9–17)
ANTIBODY IDENTIFICATION: NORMAL
ANTIBODY SCREEN: POSITIVE
ARM BAND NUMBER: NORMAL
AST SERPL-CCNC: 16 U/L
BACTERIA: NORMAL
BARBITURATE SCREEN URINE: NEGATIVE
BASOPHILS # BLD: 0 % (ref 0–2)
BASOPHILS ABSOLUTE: 0.04 K/UL (ref 0–0.2)
BENZODIAZEPINE SCREEN, URINE: NEGATIVE
BILIRUB SERPL-MCNC: <0.1 MG/DL (ref 0.3–1.2)
BILIRUBIN URINE: NEGATIVE
BUN BLDV-MCNC: 10 MG/DL (ref 6–20)
BUN/CREAT BLD: ABNORMAL (ref 9–20)
BUPRENORPHINE URINE: NORMAL
CALCIUM SERPL-MCNC: 9.1 MG/DL (ref 8.6–10.4)
CANNABINOID SCREEN URINE: NEGATIVE
CASTS UA: NORMAL /LPF (ref 0–8)
CHLORIDE BLD-SCNC: 103 MMOL/L (ref 98–107)
CO2: 19 MMOL/L (ref 20–31)
COCAINE METABOLITE, URINE: NEGATIVE
COLOR: YELLOW
COMMENT UA: ABNORMAL
CREAT SERPL-MCNC: 0.37 MG/DL (ref 0.5–0.9)
CREATININE URINE: 99.7 MG/DL (ref 28–217)
CRYSTALS, UA: NORMAL /HPF
DIFFERENTIAL TYPE: ABNORMAL
EOSINOPHILS RELATIVE PERCENT: 1 % (ref 1–4)
EPITHELIAL CELLS UA: NORMAL /HPF (ref 0–5)
EXPIRATION DATE: NORMAL
GFR AFRICAN AMERICAN: >60 ML/MIN
GFR NON-AFRICAN AMERICAN: >60 ML/MIN
GFR SERPL CREATININE-BSD FRML MDRD: ABNORMAL ML/MIN/{1.73_M2}
GFR SERPL CREATININE-BSD FRML MDRD: ABNORMAL ML/MIN/{1.73_M2}
GLUCOSE BLD-MCNC: 98 MG/DL (ref 70–99)
GLUCOSE URINE: NEGATIVE
HCT VFR BLD CALC: 36.7 % (ref 36.3–47.1)
HEMOGLOBIN: 12.3 G/DL (ref 11.9–15.1)
IMMATURE GRANULOCYTES: 2 %
KETONES, URINE: NEGATIVE
LEUKOCYTE ESTERASE, URINE: ABNORMAL
LYMPHOCYTES # BLD: 16 % (ref 25–45)
MCH RBC QN AUTO: 29.6 PG (ref 25.2–33.5)
MCHC RBC AUTO-ENTMCNC: 33.5 G/DL (ref 28.4–34.8)
MCV RBC AUTO: 88.2 FL (ref 82.6–102.9)
MDMA URINE: NORMAL
METHADONE SCREEN, URINE: NEGATIVE
METHAMPHETAMINE, URINE: NORMAL
MONOCYTES # BLD: 7 % (ref 2–8)
MUCUS: NORMAL
NITRITE, URINE: NEGATIVE
NRBC AUTOMATED: 0 PER 100 WBC
OPIATES, URINE: NEGATIVE
OTHER OBSERVATIONS UA: NORMAL
OXYCODONE SCREEN URINE: NEGATIVE
PDW BLD-RTO: 13.3 % (ref 11.8–14.4)
PH UA: 6.5 (ref 5–8)
PHENCYCLIDINE, URINE: NEGATIVE
PLATELET # BLD: 185 K/UL (ref 138–453)
PLATELET ESTIMATE: ABNORMAL
PMV BLD AUTO: 12.5 FL (ref 8.1–13.5)
POTASSIUM SERPL-SCNC: 4 MMOL/L (ref 3.7–5.3)
PROPOXYPHENE, URINE: NORMAL
PROTEIN UA: ABNORMAL
RBC # BLD: 4.16 M/UL (ref 3.95–5.11)
RBC # BLD: ABNORMAL 10*6/UL
RBC UA: NORMAL /HPF (ref 0–4)
RENAL EPITHELIAL, UA: NORMAL /HPF
SARS-COV-2, RAPID: NOT DETECTED
SEG NEUTROPHILS: 74 % (ref 34–64)
SEGMENTED NEUTROPHILS ABSOLUTE COUNT: 8.84 K/UL (ref 1.5–8.1)
SODIUM BLD-SCNC: 135 MMOL/L (ref 135–144)
SPECIFIC GRAVITY UA: 1.02 (ref 1–1.03)
SPECIMEN DESCRIPTION: NORMAL
T. PALLIDUM, IGG: NONREACTIVE
TEST INFORMATION: NORMAL
TOTAL PROTEIN, URINE: 24 MG/DL
TOTAL PROTEIN: 6.2 G/DL (ref 6.4–8.3)
TRICHOMONAS: NORMAL
TRICYCLIC ANTIDEPRESSANTS, UR: NORMAL
TURBIDITY: CLEAR
URINE HGB: NEGATIVE
URINE TOTAL PROTEIN CREATININE RATIO: 0.24 (ref 0–0.2)
UROBILINOGEN, URINE: NORMAL
WBC # BLD: 11.9 K/UL (ref 4.5–13.5)
WBC # BLD: ABNORMAL 10*3/UL
WBC UA: NORMAL /HPF (ref 0–5)
YEAST: NORMAL

## 2021-05-26 PROCEDURE — 6360000002 HC RX W HCPCS

## 2021-05-26 PROCEDURE — 82570 ASSAY OF URINE CREATININE: CPT

## 2021-05-26 PROCEDURE — 86850 RBC ANTIBODY SCREEN: CPT

## 2021-05-26 PROCEDURE — 6360000002 HC RX W HCPCS: Performed by: STUDENT IN AN ORGANIZED HEALTH CARE EDUCATION/TRAINING PROGRAM

## 2021-05-26 PROCEDURE — 3700000025 EPIDURAL BLOCK: Performed by: ANESTHESIOLOGY

## 2021-05-26 PROCEDURE — 1220000000 HC SEMI PRIVATE OB R&B

## 2021-05-26 PROCEDURE — 83986 ASSAY PH BODY FLUID NOS: CPT

## 2021-05-26 PROCEDURE — 6360000002 HC RX W HCPCS: Performed by: NURSE ANESTHETIST, CERTIFIED REGISTERED

## 2021-05-26 PROCEDURE — 85025 COMPLETE CBC W/AUTO DIFF WBC: CPT

## 2021-05-26 PROCEDURE — 80307 DRUG TEST PRSMV CHEM ANLYZR: CPT

## 2021-05-26 PROCEDURE — 84156 ASSAY OF PROTEIN URINE: CPT

## 2021-05-26 PROCEDURE — 96374 THER/PROPH/DIAG INJ IV PUSH: CPT

## 2021-05-26 PROCEDURE — 86870 RBC ANTIBODY IDENTIFICATION: CPT

## 2021-05-26 PROCEDURE — 6360000002 HC RX W HCPCS: Performed by: ANESTHESIOLOGY

## 2021-05-26 PROCEDURE — 80053 COMPREHEN METABOLIC PANEL: CPT

## 2021-05-26 PROCEDURE — 2500000003 HC RX 250 WO HCPCS: Performed by: NURSE ANESTHETIST, CERTIFIED REGISTERED

## 2021-05-26 PROCEDURE — 86901 BLOOD TYPING SEROLOGIC RH(D): CPT

## 2021-05-26 PROCEDURE — 96372 THER/PROPH/DIAG INJ SC/IM: CPT

## 2021-05-26 PROCEDURE — 86900 BLOOD TYPING SEROLOGIC ABO: CPT

## 2021-05-26 PROCEDURE — 87635 SARS-COV-2 COVID-19 AMP PRB: CPT

## 2021-05-26 PROCEDURE — 87086 URINE CULTURE/COLONY COUNT: CPT

## 2021-05-26 PROCEDURE — 81001 URINALYSIS AUTO W/SCOPE: CPT

## 2021-05-26 PROCEDURE — 2580000003 HC RX 258: Performed by: STUDENT IN AN ORGANIZED HEALTH CARE EDUCATION/TRAINING PROGRAM

## 2021-05-26 PROCEDURE — 86780 TREPONEMA PALLIDUM: CPT

## 2021-05-26 RX ORDER — LIDOCAINE HYDROCHLORIDE 10 MG/ML
30 INJECTION, SOLUTION EPIDURAL; INFILTRATION; INTRACAUDAL; PERINEURAL PRN
Status: DISCONTINUED | OUTPATIENT
Start: 2021-05-26 | End: 2021-05-27 | Stop reason: HOSPADM

## 2021-05-26 RX ORDER — PROMETHAZINE HYDROCHLORIDE 25 MG/ML
25 INJECTION, SOLUTION INTRAMUSCULAR; INTRAVENOUS ONCE
Status: COMPLETED | OUTPATIENT
Start: 2021-05-26 | End: 2021-05-26

## 2021-05-26 RX ORDER — NALOXONE HYDROCHLORIDE 0.4 MG/ML
0.4 INJECTION, SOLUTION INTRAMUSCULAR; INTRAVENOUS; SUBCUTANEOUS PRN
Status: DISCONTINUED | OUTPATIENT
Start: 2021-05-26 | End: 2021-05-27 | Stop reason: HOSPADM

## 2021-05-26 RX ORDER — ROPIVACAINE HYDROCHLORIDE 2 MG/ML
INJECTION, SOLUTION EPIDURAL; INFILTRATION; PERINEURAL
Status: COMPLETED
Start: 2021-05-26 | End: 2021-05-26

## 2021-05-26 RX ORDER — LIDOCAINE HYDROCHLORIDE AND EPINEPHRINE 15; 5 MG/ML; UG/ML
INJECTION, SOLUTION EPIDURAL PRN
Status: DISCONTINUED | OUTPATIENT
Start: 2021-05-26 | End: 2021-05-27 | Stop reason: SDUPTHER

## 2021-05-26 RX ORDER — SODIUM CHLORIDE, SODIUM LACTATE, POTASSIUM CHLORIDE, CALCIUM CHLORIDE 600; 310; 30; 20 MG/100ML; MG/100ML; MG/100ML; MG/100ML
INJECTION, SOLUTION INTRAVENOUS CONTINUOUS
Status: DISCONTINUED | OUTPATIENT
Start: 2021-05-26 | End: 2021-05-27

## 2021-05-26 RX ORDER — NALBUPHINE HCL 10 MG/ML
5 AMPUL (ML) INJECTION EVERY 4 HOURS PRN
Status: DISCONTINUED | OUTPATIENT
Start: 2021-05-26 | End: 2021-05-27 | Stop reason: HOSPADM

## 2021-05-26 RX ORDER — ACETAMINOPHEN 500 MG
1000 TABLET ORAL EVERY 6 HOURS PRN
Status: DISCONTINUED | OUTPATIENT
Start: 2021-05-26 | End: 2021-05-27 | Stop reason: HOSPADM

## 2021-05-26 RX ORDER — DIPHENHYDRAMINE HCL 25 MG
25 TABLET ORAL EVERY 4 HOURS PRN
Status: DISCONTINUED | OUTPATIENT
Start: 2021-05-26 | End: 2021-05-27 | Stop reason: HOSPADM

## 2021-05-26 RX ORDER — ONDANSETRON 2 MG/ML
4 INJECTION INTRAMUSCULAR; INTRAVENOUS EVERY 6 HOURS PRN
Status: DISCONTINUED | OUTPATIENT
Start: 2021-05-26 | End: 2021-05-27 | Stop reason: HOSPADM

## 2021-05-26 RX ORDER — ROPIVACAINE HYDROCHLORIDE 2 MG/ML
INJECTION, SOLUTION EPIDURAL; INFILTRATION; PERINEURAL PRN
Status: DISCONTINUED | OUTPATIENT
Start: 2021-05-26 | End: 2021-05-27 | Stop reason: SDUPTHER

## 2021-05-26 RX ORDER — MORPHINE SULFATE 10 MG/ML
10 INJECTION, SOLUTION INTRAMUSCULAR; INTRAVENOUS ONCE
Status: COMPLETED | OUTPATIENT
Start: 2021-05-26 | End: 2021-05-26

## 2021-05-26 RX ADMIN — PROMETHAZINE HYDROCHLORIDE 25 MG: 25 INJECTION INTRAMUSCULAR; INTRAVENOUS at 22:13

## 2021-05-26 RX ADMIN — LIDOCAINE HYDROCHLORIDE,EPINEPHRINE BITARTRATE 5 ML: 15; .005 INJECTION, SOLUTION EPIDURAL; INFILTRATION; INTRACAUDAL; PERINEURAL at 23:23

## 2021-05-26 RX ADMIN — ROPIVACAINE HYDROCHLORIDE 5 ML: 2 INJECTION, SOLUTION EPIDURAL; INFILTRATION at 23:33

## 2021-05-26 RX ADMIN — Medication 1 MILLI-UNITS/MIN: at 18:00

## 2021-05-26 RX ADMIN — ONDANSETRON 4 MG: 2 INJECTION INTRAMUSCULAR; INTRAVENOUS at 22:06

## 2021-05-26 RX ADMIN — DEXTROSE MONOHYDRATE 5 MILLION UNITS: 5 INJECTION INTRAVENOUS at 17:01

## 2021-05-26 RX ADMIN — ROPIVACAINE HYDROCHLORIDE 10 ML/HR: 2 INJECTION, SOLUTION EPIDURAL; INFILTRATION at 23:36

## 2021-05-26 RX ADMIN — SODIUM CHLORIDE, POTASSIUM CHLORIDE, SODIUM LACTATE AND CALCIUM CHLORIDE: 600; 310; 30; 20 INJECTION, SOLUTION INTRAVENOUS at 16:57

## 2021-05-26 RX ADMIN — Medication 2.5 MILLION UNITS: at 21:02

## 2021-05-26 RX ADMIN — MORPHINE SULFATE 10 MG: 10 INJECTION INTRAVENOUS at 22:13

## 2021-05-26 ASSESSMENT — PAIN SCALES - GENERAL
PAINLEVEL_OUTOF10: 9
PAINLEVEL_OUTOF10: 0

## 2021-05-26 ASSESSMENT — LIFESTYLE VARIABLES: SMOKING_STATUS: 0

## 2021-05-26 NOTE — FLOWSHEET NOTE
Pt to LD for srom at 1345. Pt states having occasional ctx. Pt denies bleeding. Positive fetal movement.

## 2021-05-26 NOTE — H&P
[facility]    Date: 2021       Time: 3:23 PM   Patient Name: Srinivasan Westbrook     Patient : 2000  Room/Bed: [facility]/7875-87    Admission Date/Time: 2021  3:03 PM      CC: Leaking fluid      HPI: Srinivasan Westbrook is a 24 y.o.  at 37w6d who presents with leaking fluid. Reports around [phone] she thought her water broke. States she felt a large gush of fluid that was clear with no odor and she continues to leak now. States she is not feeling contractions. Patient denies any headache, visual changes, difficulty breathing, RUQ pain, N/V, F/C, and pain/swelling in lower extremities. Denies any dysuria or vaginal discharge. The patient reports fetal movement is present and denies vaginal bleeding. Pregnancy is complicated by chronic HTN (no meds), right labia lipoma, atypical finding of sex chromosome, fetal microcephaly, marginal cord insertion, + Coxsackie B, + Parvo IgG, + CMV IgG and BMI 43    DATING:  LMP: No LMP recorded (lmp unknown). Patient is pregnant.   Estimated Date of Delivery: 21   Based on: early ultrasound, at 5 5/7 weeks GA    PREGNANCY RISK FACTORS:  Patient Active Problem List   Diagnosis    Rh negative state in antepartum period    GBS (group B Streptococcus carrier), +RV culture, currently pregnant    Lipoma of Vulva/Right Labia    ASA 81 mg/dy per MFM    Marginal insertion of umbilical cord affecting management of mother    ANA (amniotic fluid index) decreased    Fetal microcephaly    Positive Coxsackie B virus serologies        Steroids Given In This Pregnancy:  no     REVIEW OF SYSTEMS:   Constitutional: negative fever, negative chills, negative weight changes   HEENT: negative visual disturbances, negative headaches, negative dizziness  Breast: negative breast abnormalities, negative breast lumps, negative nipple discharge  Respiratory: negative dyspnea, negative cough, negative SOB  Cardiovascular: negative chest pain,  negative palpitations, negative arrhythmia, negative syncope   Gastrointestinal: negative abdominal pain, negative RUQ pain, negative N/V, negative diarrhea, negative constipation, negative bowel changes, negative heartburn   Genitourinary: negative dysuria, negative hematuria, negative urinary incontinence, negative vaginal discharge, pos leaking fluid   Dermatological: negative rash, negative pruritis, negative mole changes  Hematologic: negative bruising  Immunologic/Lymphatic: negative recent illness, negative recent sick contact  Musculoskeletal: negative back pain, negative myalgias, negative arthralgias  Neurological:  negative dizziness, negative migraines, negative seizures, negative weakness  Behavior/Psych: negative depression, negative anxiety, negative SI, negative HI    OBSTETRICAL HISTORY:   OB History    Para Term  AB Living   1 0 0 0 0 0   SAB TAB Ectopic Molar Multiple Live Births   0 0 0 0 0 0      # Outcome Date GA Lbr John/2nd Weight Sex Delivery Anes PTL Lv   1 Current                PAST MEDICAL HISTORY:   has a past medical history of No pertinent past medical history. PAST SURGICAL HISTORY:   has a past surgical history that includes [place] tooth extraction. ALLERGIES:  has No Known Allergies. MEDICATIONS:  Prior to Admission medications    Medication Sig Start Date End Date Taking? Authorizing Provider   Prenatal Vit-Fe Fumarate-FA (PRENATAL VITAMINS) 28-0.8 MG TABS Take 1 tablet by mouth daily 21   GORDON Cunningham CNP   acetaminophen (TYLENOL) 325 MG tablet Take 2 tablets by mouth every 6 hours as needed for Pain 20   Joy Spencer DO       FAMILY HISTORY:  family history includes Diabetes in her maternal grandmother. SOCIAL HISTORY:   reports that she has never smoked. She has never used smokeless tobacco. She reports previous alcohol use. She reports that she does not use drugs.     VITALS:  Vitals:    21 1600 05/26/21 1800 05/26/21 1900 [birth year]/2000   BP:  (!) 142/84 132/67 126/82   Pulse:  89 101 93   Resp:  18 18 18   Temp:  97.8 °F (36.6 °C) 98 °F (36.7 °C)    TempSrc:  Oral Oral    Weight: 269 lb (122 kg)      Height: 5' 6\" (1.676 m)            PHYSICAL EXAM:  Fetal Heart Monitor:  Baseline Heart Rate 150, moderate variability, present accelerations, absent decelerations  [place]: contractions, irritability     General appearance:  no apparent distress, alert and cooperative  HEENT: head atraumatic, normocephalic, trachea midline, moist mucous membranes   Neurologic:  oriented, normal speech, no focal findings or movement disorder noted  Lungs:  no increased work of breathing, good air exchange, clear to auscultation bilaterally, no crackles or wheezing  Heart:  regular rate and rhythm   Abdomen:  soft, gravid, non-tender on palpation, no right upper quadrant tenderness,  uterus non-tender, no signs of abruption and no signs of chorioamnionitis  Extremities:  no calf tenderness bilaterally, non-edematous bilaterally   Musculoskeletal: no gross abnormalities, range of motion appropriate for age   Psychiatric: mood appropriate, normal affect     Examination chaperoned by Lisseth Kern RN     Pelvic Exam:   Vulva: normal clitoris, large 7cm labial lipoma located on the right labia, very soft and is not obstructing the vaginal canal, it is not tender to palpation     Vagina: normal appearing urethral meatus, normal appearing vaginal mucosa with normal color and discharge, no lesions, no vaginal bleeding, pos pooling and valsalva     Cervix: normal appearing cervix without pathologic appearing discharge or lesions, cervix thick and visibly closed, no cervical motion tenderness   Uterus: is gravid, normal size, shape, consistency and non-tender    Adnexa: non-tender, no palpable masses   Rectal Exam: not indicated     Speculum: pos pooling, pos valsalva, no abnormal lesions, no bleeding, visibly dilated     Cervix Check: 3 cm dilated, 70 % effaced, -2 station, mid position, soft consistency, Fetal Position: Cephalic (confirmed by ultrasound), Membranes ruptured     DATA:  Membranes Ruptured: Yes  Fern: positive  Nitrazine: Positive  Valsalva/Pooling: present/present   Vaginal Bleeding: absent     LIMITED BEDSIDE US:  Position: Cephalic  Placental Location: right lateral   Fetal Heart Tones: Present  Fetal Movement: Present  Amniotic Fluid Index/Volume: adequate 2x2 cm fluid pocket  Estimated Fetal Weight:  6 lbs 4oz    PRENATAL LAB RESULTS:   Blood Type/Rh: O neg  Antibody Screen: positive for anti d passive due to RhIG  Hemoglobin, Hematocrit, Platelets: 57.1 / 40.8 / 206  Rubella: immune  T. 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 female  at 37w6d IUP   - GBS positive / Rh negative / R immune   - Pen G for GBS prophylaxis   - Rhogam work up needed post partun    - Rapid COVID19 ordered     PROM (clear @ [phone])   - Admit to labor and delivery under the service of Dr Suraj Espinal    - Patient is ruptured with + ferning and nitrazine    - VSS, tachycardia of 108 noted    - cEFM and TOCO   - Cat I FHT and TOCO showing no contractions    - CBC, T&S, T.Pal ordered   - UDS ordered.  R/B/A discussed with patient and patient agreeable   - IVF: [email or web address]   - Plan of Induction: pitocin    - Continue expectant management     Chronic HTN (no meds)    - Elevated BP of 143/87 on admission    - Denies any s/s of preE   - Will collect pre E labs and P/C with admission labs    - Last BPP was 8/8 on 21 at [place] [address] office     Right Labia Lipoma    - Patient was referred to gyn onc and was seen on 12/10/20. She was offered for lipoma to be removed at that time but patient declined. She still desires for it to be removed after delivery. Gyn Onc wants to see patient 6-8 weeks after delivery for follow up    - Pelvic MRI 21: 8.8x3. 3x5.5 cm encapsulated lesion involving right labia major with findings most compatible with lipoma    - Pelvic US 20: solid mass on right labia measuring 7.7 cm, no evidence of fluid collection    - C/S was offered when patient was admitted and she declined. Lipoma is very soft and not obstructing the vaginal canal. Explained that when it comes time for pushing, this lipoma can increase in size and potentially worsen or tear. Patient still desires to try for a vaginal delivery. 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  She declined amnioinfusion and desires  karyotype    - Fetal echo wnl on 21    Hx Chlamydia    - Pos 21    - Neg 20    Fetal Microcephaly   - Positive Coxsackie B serologies     - Positive CMV IgG   - Pos Parvovirus IgG   - Has been following with [facility]    - Last BPP was 8/8 on 21 at [facility] office     Marginal Cord Insertion    - Has been following with [facility]     BMI 43   - Has been following with [facility]      Patient Active Problem List    Diagnosis Date Noted    Positive Coxsackie B virus serologies 2021     Priority: High    Fetal microcephaly 2021     Priority: High    Marginal insertion of umbilical cord affecting management of mother 2021     Priority: High    Lipoma of Vulva/Right Labia 2020     Priority: High     GYN ONC Referral      ANA (amniotic fluid index) decreased 2021 Follow up in 1 week      ASA 81 mg/dy per MFM 12/03/2020    GBS (group B Streptococcus carrier), +RV culture, currently pregnant 11/23/2020 11/23/2020 + GBS- treat in labor per protocol      Rh negative state in antepartum period 11/18/2020     Needs rhogam at 28 weeks         Plan discussed with Dr. Jeffery Martinez, who is agreeable. Steroids given this admission: No    Risks, benefits, alternatives and possible complications have been discussed in detail with the patient. Admission, and post admission procedures and expectations were discussed in detail. All questions were answered.     Attending's Name: Dr. Geovany Olson DO  Ob/Gyn Resident  5/26/2021, 3:23 PM

## 2021-05-26 NOTE — DISCHARGE SUMMARY
Obstetric Discharge Summary  [facility]    Patient Name: Juve Polk  Patient : 2000  Primary Care Physician: GORDON Torres CNP  Admit Date: 2021    Principal Diagnosis: IUP at 37w5d, admitted for PROM (clear fluid)     Her pregnancy has been complicated by:   Patient Active Problem List   Diagnosis    Rh negative state in antepartum period    GBS (group B Streptococcus carrier), +RV culture, currently pregnant    Lipoma of Vulva/Right Labia    ASA 81 mg/dy per MFM    Marginal insertion of umbilical cord affecting management of mother    ANA (amniotic fluid index) decreased    Fetal microcephaly    Positive Coxsackie B virus serologies    37 weeks gestation of pregnancy     21 F APG 7/ Wt 6#3       Infection Present?: No  Hospital Acquired: No    Surgical Operations & Procedures:  [] Pitocin Induction of Labor  [x] Pitocin Augmentation of Labor  [] Prostaglandin Induction of Labor  [] Mechanical Induction of Labor  [] Artificial Rupture of Membranes  [x] Intrauterine Pressure Catheter  [] Fetal Scalp Electrode  [] Amnioinfusion  Analgesia: epidural  Delivery Type: Spontaneous Vaginal Delivery: See Labor and Delivery Summary   Laceration(s): right labial laceration, did not require repair     Consultations:  Anesthesia    Pertinent Findings & Procedures:   Juve Polk is a 24 y.o. female  at 37w6d admitted for PROM; received Penicillin G, Pitocin, morphine and phenergan and an epidural, IUPC. She delivered by spontaneous vaginal a Live Born infant on 21. Information for the patient's :  Vielka Poet Girl Renetta Bella [[id number]]   female   Birth Weight: 6 lb 3.5 oz (2.82 kg)       Apgars: 7 at 1 minute and 9 at 5 minutes. Uterine atony was noted and methergine IM was administered.        Postpartum course: normal.      Course of patient: uncomplicated    Discharge to: Home    Readmission planned: no     Recommendations on

## 2021-05-27 LAB
CULTURE: NORMAL
Lab: NORMAL
SPECIMEN DESCRIPTION: NORMAL

## 2021-05-27 PROCEDURE — 6360000002 HC RX W HCPCS: Performed by: STUDENT IN AN ORGANIZED HEALTH CARE EDUCATION/TRAINING PROGRAM

## 2021-05-27 PROCEDURE — 88307 TISSUE EXAM BY PATHOLOGIST: CPT

## 2021-05-27 PROCEDURE — 96365 THER/PROPH/DIAG IV INF INIT: CPT

## 2021-05-27 PROCEDURE — 96372 THER/PROPH/DIAG INJ SC/IM: CPT

## 2021-05-27 PROCEDURE — 6370000000 HC RX 637 (ALT 250 FOR IP): Performed by: STUDENT IN AN ORGANIZED HEALTH CARE EDUCATION/TRAINING PROGRAM

## 2021-05-27 PROCEDURE — 1220000000 HC SEMI PRIVATE OB R&B

## 2021-05-27 PROCEDURE — 59400 OBSTETRICAL CARE: CPT | Performed by: OBSTETRICS & GYNECOLOGY

## 2021-05-27 PROCEDURE — 7200000001 HC VAGINAL DELIVERY

## 2021-05-27 RX ORDER — DOCUSATE SODIUM 100 MG/1
100 CAPSULE, LIQUID FILLED ORAL 2 TIMES DAILY
Qty: 60 CAPSULE | Refills: 1 | Status: SHIPPED | OUTPATIENT
Start: 2021-05-27 | End: 2021-06-09

## 2021-05-27 RX ORDER — LANOLIN 72 %
OINTMENT (GRAM) TOPICAL PRN
Status: DISCONTINUED | OUTPATIENT
Start: 2021-05-27 | End: 2021-05-28 | Stop reason: HOSPADM

## 2021-05-27 RX ORDER — ACETAMINOPHEN 500 MG
1000 TABLET ORAL EVERY 6 HOURS PRN
Status: DISCONTINUED | OUTPATIENT
Start: 2021-05-27 | End: 2021-05-28 | Stop reason: HOSPADM

## 2021-05-27 RX ORDER — SENNA AND DOCUSATE SODIUM 50; 8.6 MG/1; MG/1
2 TABLET, FILM COATED ORAL DAILY PRN
Status: DISCONTINUED | OUTPATIENT
Start: 2021-05-27 | End: 2021-05-28 | Stop reason: HOSPADM

## 2021-05-27 RX ORDER — METHYLERGONOVINE MALEATE 0.2 MG/ML
200 INJECTION INTRAVENOUS ONCE
Status: COMPLETED | OUTPATIENT
Start: 2021-05-27 | End: 2021-05-27

## 2021-05-27 RX ORDER — SIMETHICONE 80 MG
80 TABLET,CHEWABLE ORAL EVERY 6 HOURS PRN
Status: DISCONTINUED | OUTPATIENT
Start: 2021-05-27 | End: 2021-05-28 | Stop reason: HOSPADM

## 2021-05-27 RX ORDER — HYDROCORTISONE 25 MG/G
CREAM TOPICAL
Status: DISCONTINUED | OUTPATIENT
Start: 2021-05-27 | End: 2021-05-28 | Stop reason: HOSPADM

## 2021-05-27 RX ORDER — IBUPROFEN 600 MG/1
600 TABLET ORAL EVERY 6 HOURS
Status: DISCONTINUED | OUTPATIENT
Start: 2021-05-27 | End: 2021-05-28 | Stop reason: HOSPADM

## 2021-05-27 RX ORDER — ONDANSETRON 2 MG/ML
4 INJECTION INTRAMUSCULAR; INTRAVENOUS EVERY 4 HOURS PRN
Status: DISCONTINUED | OUTPATIENT
Start: 2021-05-27 | End: 2021-05-28 | Stop reason: HOSPADM

## 2021-05-27 RX ORDER — IBUPROFEN 600 MG/1
600 TABLET ORAL EVERY 6 HOURS PRN
Qty: 40 TABLET | Refills: 1 | Status: SHIPPED | OUTPATIENT
Start: 2021-05-27 | End: 2021-06-09

## 2021-05-27 RX ADMIN — IBUPROFEN 600 MG: 600 TABLET, FILM COATED ORAL at 10:23

## 2021-05-27 RX ADMIN — Medication 500 ML: at 02:05

## 2021-05-27 RX ADMIN — METHYLERGONOVINE MALEATE 200 MCG: 0.2 INJECTION, SOLUTION INTRAMUSCULAR; INTRAVENOUS at 02:05

## 2021-05-27 RX ADMIN — Medication 2.5 MILLION UNITS: at 01:08

## 2021-05-27 RX ADMIN — IBUPROFEN 600 MG: 600 TABLET, FILM COATED ORAL at 18:35

## 2021-05-27 RX ADMIN — IBUPROFEN 600 MG: 600 TABLET, FILM COATED ORAL at 04:19

## 2021-05-27 ASSESSMENT — PAIN SCALES - GENERAL
PAINLEVEL_OUTOF10: 2
PAINLEVEL_OUTOF10: 4
PAINLEVEL_OUTOF10: 4
PAINLEVEL_OUTOF10: 1

## 2021-05-27 ASSESSMENT — PAIN DESCRIPTION - LOCATION: LOCATION: ABDOMEN

## 2021-05-27 ASSESSMENT — PAIN DESCRIPTION - PAIN TYPE: TYPE: ACUTE PAIN

## 2021-05-27 ASSESSMENT — PAIN DESCRIPTION - FREQUENCY: FREQUENCY: INTERMITTENT

## 2021-05-27 ASSESSMENT — PAIN DESCRIPTION - PROGRESSION: CLINICAL_PROGRESSION: GRADUALLY WORSENING

## 2021-05-27 ASSESSMENT — PAIN DESCRIPTION - DESCRIPTORS: DESCRIPTORS: CRAMPING

## 2021-05-27 NOTE — PROGRESS NOTES
Labor Progress Note    Emily Jones is a 24 y.o. female  at 37w6d  The patient was seen and examined. Her pain is well controlled. Recurrent variable decelerations noted, IUPC placed, patient repositioned. Pitocin briefly turned off but tracing returned to category 1 and pitocin was turned back on. She reports fetal movement is present, complains of contractions, complains of loss of fluid, denies vaginal bleeding.        Vital Signs:  Vitals:    21 2330 21 2333 21 0000 21 0030   BP: (!) 129/102 (!) 147/85 (!) 142/80 (!) 115/53   Pulse: 80 88 88 82   Resp: 18 18 16 18   Temp:       TempSrc:       Weight:       Height:             FHT: 125, moderate variability, accelerations present, decelerations early and varaible  Contractions: regular, every 2-3 minutes    Chaperone for Intimate Exam: Chaperone was present for entire exam, Chaperone Name: Tamera  Cervical Exam: 9 cm dilated, 90 effaced, 0 station  Pitocin: @ 10 mu/min, turned off briefly then turned back on when tracing returned to category 1    Membranes: Ruptured clear fluid  Scalp Electrode in place: absent  Intrauterine Pressure Catheter in Place: present    Interventions: IUPC placed, pitocin turned off briefly then turned back on when tracing returned to category 1    Assessment/Plan:  Emily Jones is a 24 y.o. female  at 37w6d admitted for PROM clear fluid @  1345    - GBS positive, Pen G for GBS prophylaxis              - VSS              - cEFM/TOCO              - s/p morphine/phenergan              - Pitocin per protocol              - Epidural in place   - IUPC placed   - pitocin turned off briefly then turned back on when tracing returned to category 1   - Dr. Patel Hutchinson en route       Attending updated and in agreement with plan    [employer], DO  Ob/Gyn Resident  2021, 12:40 AM

## 2021-05-27 NOTE — CARE COORDINATION
Social Work     Sw reviewed medical record (current active problem list) and tox screens and found no concerns. Sw spoke with mom briefly to explain Sw role, inquire if any needs or concerns, and provide safe sleep education and discuss. Mom denied any needs or questions and informs baby has a safe sleep environment. Mom denied any current s/s of anxiety or depression and is aware to reach out to OB if any s/s occur after dc. Mom reports a  Really good support system and denied any current questions or needs. Mom reports this is her first baby and she has not yet chosen a ped. Sw encouraged mom to reach out if any issues or concerns arise.

## 2021-05-27 NOTE — LACTATION NOTE
Mom reports her baby hasn't fed in several hours. Baby was swaddled and sleeping in the bassinet. Encourage mom to take the baby's arms out of the swaddle so that baby can alert when ready to feed. Mom to call out when baby alerts for a feeding.

## 2021-05-27 NOTE — CARE COORDINATION
POST-PARTUM/WIN INITIAL DISCHARGE PLANNING/CARE COORDINATION    37 weeks gestation of pregnancy [Z3A.37]    Writer met w/ Moses Torres and Leia Lambert at bedside to discuss DCP. Moses Torres is S/P  on 2021 @ 0153 at  [facility] name on BC: Bharti Mathew. Infant to WIN. Infant PCP [facility]. FOB: Shamekajosé luis Chandra phone [phone]    Writer verified name/address/phone number correct on facesheet    BCBS, [facility] Choice and MMO [employer] are correct. Writer notified patient she has 30 days from date of birth to add Opal Pina to insurance policy. Moses Torres verbalized understanding and will call [employer] She works for [employer] and will be adding to the [employer] ([address] Lu are from her parents so cannot add grandchild.)     Louis Diamond verbalized has/have all necessary items for Opal Pina. No previous home care or dme. Anticipate DC of couplet 2021    CM continue to follow for any DC needs.

## 2021-05-27 NOTE — FLOWSHEET NOTE
Patient admitted to room 736 from L&Dvia wheelchair. Oriented to room and surroundings. Plan of care reviewed. Verbalized understanding. Instructed on infant security and safe sleep practices. Preventing falls education provided . The following handouts given: A New Beginning: Your Guide to Postpartum Care, Rounding, gs Security System,Babies Cry A lot, Safe Sleep, Security and Visitation Guidelines. Call light placed within reach.

## 2021-05-27 NOTE — PROGRESS NOTES
OBGYN Labor Progress Note    Jessie Valle is a 24 y.o. female  at 37w6d  The patient was seen and examined. Her pain is not well controlled. Requesting pain meds at this time. She reports fetal movement is present, complains of contractions, complains of loss of fluid, denies vaginal bleeding.        Vital Signs:  Vitals:    21 192000 21 2100 21 2201   BP: 132/67 126/82 139/89 126/85   Pulse: 101 93 86 92   Resp: 18 18 18 18   Temp: 98 °F (36.7 °C)  98.2 °F (36.8 °C)    TempSrc: Oral  Oral    Weight:       Height:         FHT: 120, moderate variability, accelerations present, decelerations absent  Contractions: regular, every 3-4 minutes    Examination chaperoned by Tamera HAIR     Cervical Exam: 5 cm dilated, 70 effaced, -2 station  Pitocin: @ 10 mu/min    Membranes: Ruptured clear fluid  Scalp Electrode in place: absent  Intrauterine Pressure Catheter in Place: absent    Interventions: none    Assessment/Plan:  Jessie Valle is a 24 y.o. female  at 37w6d IUP   - GBS positive / Rh negative / R immune              - Pen G for GBS prophylaxis              - Rhogam work up PP              - COVID19 negative      PROM (clr  @ 1345)              - VSS              - cEFM and TOCO               - Morphine and phenergan ordered for pain control               - Continue pitocin per protocol     Attending updated and in agreement with plan    Elvin Oh DO  OBGYN Resident  2021, 10:05 PM

## 2021-05-27 NOTE — ANESTHESIA POSTPROCEDURE EVALUATION
Department of Anesthesiology  Postprocedure Note    Patient: Iman Betancur  MRN: [id number]  YOB: 2000  Date of evaluation: 5/27/2021  Time:  2:55 AM     Procedure Summary     Date: 05/26/21 Room / Location:     Anesthesia Start: 2308 Anesthesia Stop:     Procedure: Labor Analgesia Diagnosis:     Scheduled Providers:  Responsible Provider: Jaja Gonsalevs MD    Anesthesia Type: epidural ASA Status: 2          Anesthesia Type: No value filed. Lisa Phase I: Lisa Score: 10    Lisa Phase II:      Last vitals: Reviewed and per EMR flowsheets.        Anesthesia Post Evaluation    Patient location during evaluation: bedside  Patient participation: complete - patient participated  Level of consciousness: awake and alert  Pain score: 1  Nausea & Vomiting: no nausea and no vomiting  Complications: no  Cardiovascular status: hemodynamically stable  Respiratory status: room air  Hydration status: euvolemic

## 2021-05-27 NOTE — PLAN OF CARE
Problem: Labor Process - Prolonged:  Goal: Labor progression, first stage, within specified pattern  Description: Labor progression, first stage, within specified pattern  Outcome: Completed     Problem: Pain - Acute:  Goal: Pain level will decrease  Description: Pain level will decrease  Outcome: Completed  Goal: Pain level will decrease  Description: Pain level will decrease  Outcome: Ongoing     Problem: Discharge Planning:  Goal: Discharged to appropriate level of care  Description: Discharged to appropriate level of care  Outcome: Ongoing     Problem: Constipation:  Goal: Bowel elimination is within specified parameters  Description: Bowel elimination is within specified parameters  Outcome: Ongoing     Problem: Fluid Volume - Imbalance:  Goal: Absence of imbalanced fluid volume signs and symptoms  Description: Absence of imbalanced fluid volume signs and symptoms  Outcome: Ongoing  Goal: Absence of postpartum hemorrhage signs and symptoms  Description: Absence of postpartum hemorrhage signs and symptoms  Outcome: Ongoing     Problem: Infection - Risk of, Puerperal Infection:  Goal: Will show no infection signs and symptoms  Description: Will show no infection signs and symptoms  Outcome: Ongoing     Problem: Mood - Altered:  Goal: Mood stable  Description: Mood stable  Outcome: Ongoing

## 2021-05-27 NOTE — L&D DELIVERY NOTE
0154  Cord blood disposition: Lab  Gases sent?: Yes  Stem cell collection (by provider): No     Placenta    Date/time: 2021 02:05:00  Removal: Spontaneous  Appearance: Intact  Disposition: Lab, Pathology     Delivery Resuscitation    Method: Bulb Suction, Stimulation     Skin to Skin    Skin to skin initiation date/time: 21 01:54:00 EDT   Skin to skin with: Mother  Skin to skin end date/time:        [place] Measurements    Weight: 2820 g       Delivery Information    Episiotomy: None  Perineal lacerations: None    Labial laceration: left Repaired: No   Vaginal laceration: No    Cervical laceration: No    Surgical or additional est. blood loss (mL): 0 (View Only): Edit in Flowsheets   Combined est. blood loss (mL): 0  Repair suture: None     Vaginal Delivery Counts    Initial count personnel: Gabi Nye   4x4:  Needles:  Instruments:  Lap Pads:  Sponges:    Initial counts:         Final counts: Accurate final count?: Yes  Final vaginal sweep completed: Yes     Other Procedures    Procedures: None            Vaginal Delivery Note  Department of Obstetrics and Gynecology  [facility]       Patient: Charlie Welch   : 2000  MRN: [id number]   Date of delivery: 21     Pre-operative Diagnosis: Charlie Welch  at 37w6d IUP   PROM    Right Labial Lipoma   Atypical Findings on X Chromosome   Fetal Microcephaly with   + CoxsackieB/Parvo IgG/CMV IgG    Marginal Cord Insertion     Post-operative Diagnosis:  Living  infant, same as above    Delivering Obstetrician & Assistant(s): Dr Isabel Rubio PGY3, Kendrick Liz PGY1    Infant Information:   Information for the patient's :  Cheryle Sol Girl Kristine Kimberly [[id number]]         Apgar scores: pending     Anesthesia:  epidural anesthesia    Application and Delivery:    She was known to be GBS positive and received pen G for antibiotic prophylaxis.      The patient progressed well, received an epidural, became complete and felt the urge to push. After pushing with contractions the fetal head delivered cephalic, right occiput anterior with a compound hand over an intact perineum, nuchal cord was not present. The anterior, then posterior shoulder delivered easily and atraumatically followed by the rest of the infant. Nose and mouth were suctioned with bulb suction, infant was stimulated and dried. Cord was clamped and cut after one minute delayed cord clamping and infant was placed on mom's abdomen, and attended by RN for evaluation. The delivery of the placenta was spontaneous and appeared intact, whole and that the umbilical cord had three vessels noted. Marginal cord insertion was noted. Pitocin was started. The vagina was swept of all clots and debris. Uterine atony was noted so methergine IM was given. Atony resolved and bleeding was well controlled. The perineum and vagina were evaluated. A right labial laceration was noted but it was superficial and not bleeding so no repair was needed. Mother and baby tolerated procedure well. Dr. Zack Hahn was present for entire delivery.     Delivery Summary:  Labor & Delivery Summary  Dilation Complete Date: 05/27/21  Dilation Complete Time: 1520    Specimen: placenta sent to pathology, cord blood and cord gases  Quantitative blood loss:  300mL immediately following delivery  Condition:  infant stable to general nursery and mother stable  Counts: instrument and sponge counts correct  Blood Type and Rh: O NEGATIVE    Rubella Immunity Status: immune     Puma Middleton DO  Ob/Gyn Resident  5/27/2021, 2:20 AM

## 2021-05-27 NOTE — ANESTHESIA PRE PROCEDURE
Department of Anesthesiology  Preprocedure Note       Name:  Sinan Starkey   Age:  24 y.o.  :  2000                                          MRN:  [id number]         Date:  2021      Surgeon: Evangelina Lopez    Procedure: Labor Epidural    Medications prior to admission:   Prior to Admission medications    Medication Sig Start Date End Date Taking?  Authorizing Provider   Prenatal Vit-Fe Fumarate-FA (PRENATAL VITAMINS) 28-0.8 MG TABS Take 1 tablet by mouth daily 21   Richi Jonas, APRN - CNP   acetaminophen (TYLENOL) 325 MG tablet Take 2 tablets by mouth every 6 hours as needed for Pain 20   Joy Hall DO       Current medications:    Current Facility-Administered Medications   Medication Dose Route Frequency Provider Last Rate Last Admin    lactated ringers infusion   Intravenous Continuous Chuckie Funes  mL/hr at 21 1657 New Bag at 21    lidocaine PF 1 % injection 30 mL  30 mL Other PRN Chuckie Funes DO        ondansetron [facility] injection 4 mg  4 mg Intravenous Q6H PRN Chuckie Funes DO   4 mg at 21    diphenhydrAMINE (BENADRYL) tablet 25 mg  25 mg Oral Q4H PRN Chuckie Funes,         acetaminophen (TYLENOL) tablet 1,000 mg  1,000 mg Oral Q6H PRN Andie Juarez, DO        benzocaine-menthol (DERMOPLAST) 20-0.5 % spray   Topical PRN Chuckie Funes DO        penicillin G potassium in d5w IVPB 2.5 Million Units  2.5 Million Units Intravenous Q4H Chuckie Funes DO   Stopped at 21    oxytocin (PITOCIN) 30 units in 500 mL infusion  1-20 kymberly-units/min Intravenous Continuous Andie Juarez, DO 10 mL/hr at 21 10 kymberly-units/min at 21    ropivacaine (NAROPIN) 0.2% injection 0.2%             naloxone (NARCAN) injection 0.4 mg  0.4 mg Intravenous PRN eLah Rodriguez MD        nalbuphine (NUBAIN) injection 5 mg  5 mg Intravenous Q4H PRN Leah Rodriguez MD        ondansetron (ZOFRAN) injection 4 mg  4 mg Intravenous Q6H PRN Basil Shepherd MD        ropivacaine 0.2% in sodium chloride 0.9% (OB) epidural 100 mL  10 mL/hr Epidural Continuous Basil Shepherd MD           Allergies:  No Known Allergies    Problem List:    Patient Active Problem List   Diagnosis Code    Rh negative state in antepartum period O26.899, Z67.91    GBS (group B Streptococcus carrier), +RV culture, currently pregnant O99.820    Lipoma of Vulva/Right Labia D17.30    ASA 81 mg/dy per MFM R89.9    Marginal insertion of umbilical cord affecting management of mother O43.80    ANA (amniotic fluid index) decreased O28.8    Fetal microcephaly O35. 0XX0    Positive Coxsackie B virus serologies R89.9    37 weeks gestation of pregnancy Z3A.37       Past Medical History:        Diagnosis Date    No pertinent past medical history        Past Surgical History:        Procedure Laterality Date    WISDOM TOOTH EXTRACTION         Social History:    Social History     Tobacco Use    Smoking status: Never Smoker    Smokeless tobacco: Never Used   Substance Use Topics    Alcohol use: Not Currently                                Counseling given: Not Answered      Vital Signs (Current):   Vitals:    05/2000 05/26/21 2100 05/26/21 2201 05/26/21 2300   BP: 126/82 139/89 126/85 (!) 145/84   Pulse: 93 86 92 90   Resp: 18 18 18    Temp:  36.8 °C (98.2 °F)  36.5 °C (97.7 °F)   TempSrc:  Oral  Oral   Weight:       Height:                                                  BP Readings from Last 3 Encounters:   05/26/21 (!) 145/84   05/25/21 132/82   05/25/21 122/82       NPO Status:                                                                                 BMI:   Wt Readings from Last 3 Encounters:   05/26/21 269 lb (122 kg)   05/25/21 269 lb (122 kg)   05/25/21 269 lb (122 kg)     Body mass index is 43.42 kg/m².     CBC:   Lab Results   Component Value Date    WBC 11.9 05/26/2021    RBC 4.16 05/26/2021    HGB 12.3 05/26/2021    HCT 36.7 05/26/2021    MCV 88.2 05/26/2021    RDW 13.3 05/26/2021     05/26/2021       CMP:   Lab Results   Component Value Date     05/26/2021    K 4.0 05/26/2021     05/26/2021    CO2 19 05/26/2021    BUN 10 05/26/2021    CREATININE 0.37 05/26/2021    GFRAA >60 05/26/2021    LABGLOM >60 05/26/2021    GLUCOSE 98 05/26/2021    PROT 6.2 05/26/2021    CALCIUM 9.1 05/26/2021    BILITOT <0.10 05/26/2021    ALKPHOS 99 05/26/2021    AST 16 05/26/2021    ALT 17 05/26/2021       POC Tests: No results for input(s): POCGLU, POCNA, POCK, POCCL, POCBUN, POCHEMO, POCHCT in the last 72 hours. Coags: No results found for: PROTIME, INR, APTT    HCG (If Applicable):   Lab Results   Component Value Date    HCGQUANT 78,078 (H) 11/03/2020        ABGs: No results found for: PHART, PO2ART, RLC2TDO, UZW2YWW, BEART, G2KLCOIR     Type & Screen (If Applicable):  No results found for: LABABO, LABRH    Drug/Infectious Status (If Applicable):  No results found for: HIV, HEPCAB    COVID-19 Screening (If Applicable):   Lab Results   Component Value Date    COVID19 Not Detected 05/26/2021           Anesthesia Evaluation   no history of anesthetic complications:   Airway: Mallampati: II  TM distance: >3 FB   Neck ROM: limited  Mouth opening: > = 3 FB Dental: normal exam         Pulmonary:Negative Pulmonary ROS       (-) not a current smoker                           Cardiovascular:    (+) hypertension (gestational):,         Rhythm: regular  Rate: normal                    Neuro/Psych:   Negative Neuro/Psych ROS              GI/Hepatic/Renal:   (+) GERD: well controlled,           Endo/Other: Negative Endo/Other ROS                    Abdominal:   (+) obese,         Vascular: negative vascular ROS. Anesthesia Plan      epidural     ASA 2     (Platelets 917)        Anesthetic plan and risks discussed with patient.     Use of blood products discussed with patient whom consented to blood products.                    Eduar Walker, GORDON - CRNA   5/26/2021

## 2021-05-27 NOTE — FLOWSHEET NOTE
Brandon HEAD at bedside. Epidural procedure explained, risks discussed. Pt verbalizes consent for epidural.  2309 patient positioned for epidural.2310 Time out completed. 2322 catheter placed. 2323 test dose given. Epidural catheter taped and secured per anesthesia. 2327 to low fowlers with left uterine displacement. 2333 loading dose given. 2335 pump initiated. Pt tolerated procedure well.

## 2021-05-27 NOTE — ANESTHESIA PROCEDURE NOTES
Epidural Block    Patient location during procedure: OB  Start time: 5/26/2021 11:16 PM  End time: 5/26/2021 11:34 PM  Reason for block: labor epidural  Staffing  Performed: resident/CRNA   Anesthesiologist: Rachel Lopez MD  Resident/CRNA: Jerline Sicard, APRN - CRNA  Preanesthetic Checklist  Completed: patient identified, IV checked, site marked, risks and benefits discussed, surgical consent, monitors and equipment checked, pre-op evaluation, timeout performed, anesthesia consent given, oxygen available and patient being monitored  Epidural  Patient position: sitting  Prep: Betadine  Patient monitoring: continuous pulse ox and frequent blood pressure checks  Approach: midline  Location: lumbar (1-5)  Provider prep: mask and sterile gloves  Needle  Needle type: Tuohy   Needle gauge: 17 G  Needle length: 3.5 in  Needle insertion depth: 6 cm  Catheter type: side hole  Catheter size: 19 G  Catheter at skin depth: 12 cm  Test dose: negative  Assessment  Hemodynamics: stable  Attempts: 1

## 2021-05-28 VITALS
TEMPERATURE: 98.2 F | RESPIRATION RATE: 16 BRPM | DIASTOLIC BLOOD PRESSURE: 75 MMHG | SYSTOLIC BLOOD PRESSURE: 119 MMHG | BODY MASS INDEX: 43.23 KG/M2 | HEIGHT: 66 IN | HEART RATE: 86 BPM | WEIGHT: 269 LBS

## 2021-05-28 LAB — SURGICAL PATHOLOGY REPORT: NORMAL

## 2021-05-28 PROCEDURE — 6370000000 HC RX 637 (ALT 250 FOR IP): Performed by: STUDENT IN AN ORGANIZED HEALTH CARE EDUCATION/TRAINING PROGRAM

## 2021-05-28 RX ADMIN — IBUPROFEN 600 MG: 600 TABLET, FILM COATED ORAL at 08:46

## 2021-05-28 RX ADMIN — DOCUSATE SODIUM 50MG AND SENNOSIDES 8.6MG 2 TABLET: 8.6; 5 TABLET, FILM COATED ORAL at 08:46

## 2021-05-28 ASSESSMENT — PAIN SCALES - GENERAL: PAINLEVEL_OUTOF10: 4

## 2021-05-28 NOTE — PLAN OF CARE
Problem: Pain - Acute:  Goal: Pain level will decrease  Description: Pain level will decrease  5/27/2021 2229 by Gillian Staton RN  Outcome: Ongoing  5/27/2021 1853 by Brynn Pfeiffer RN  Outcome: Met This Shift     Problem: Discharge Planning:  Goal: Discharged to appropriate level of care  Description: Discharged to appropriate level of care  5/27/2021 2229 by Gillian Staton RN  Outcome: Ongoing  5/27/2021 1853 by Brynn Pfeiffer RN  Outcome: Met This Shift     Problem: Constipation:  Goal: Bowel elimination is within specified parameters  Description: Bowel elimination is within specified parameters  5/27/2021 2229 by Gillian Staton RN  Outcome: Ongoing  5/27/2021 1853 by Brynn Pfeiffer RN  Outcome: Met This Shift     Problem: Fluid Volume - Imbalance:  Goal: Absence of imbalanced fluid volume signs and symptoms  Description: Absence of imbalanced fluid volume signs and symptoms  5/27/2021 2229 by Gillian Staton RN  Outcome: Ongoing  5/27/2021 1853 by Brynn Pfeiffer RN  Outcome: Met This Shift  Goal: Absence of postpartum hemorrhage signs and symptoms  Description: Absence of postpartum hemorrhage signs and symptoms  5/27/2021 2229 by Gillian Staton RN  Outcome: Ongoing  5/27/2021 1853 by Brynn Pfeiffer RN  Outcome: Met This Shift     Problem: Infection - Risk of, Puerperal Infection:  Goal: Will show no infection signs and symptoms  Description: Will show no infection signs and symptoms  5/27/2021 2229 by Gillian Staton RN  Outcome: Ongoing  5/27/2021 1853 by Brynn Pfeiffer RN  Outcome: Met This Shift     Problem: Mood - Altered:  Goal: Mood stable  Description: Mood stable  5/27/2021 2229 by Gillian Staton RN  Outcome: Ongoing  5/27/2021 1853 by Brynn Pfeiffer RN  Outcome: Met This Shift

## 2021-05-28 NOTE — PROGRESS NOTES
POST PARTUM DAY # 1    Joann Dukes is a 24 y.o. female  This patient was seen & examined today.  21    Her pregnancy was complicated by:   Patient Active Problem List   Diagnosis    Rh negative state in antepartum period    GBS (group B Streptococcus carrier), +RV culture, currently pregnant    Lipoma of Vulva/Right Labia    ASA 81 mg/dy per MFM    Marginal insertion of umbilical cord affecting management of mother    ANA (amniotic fluid index) decreased    Fetal microcephaly    Positive Coxsackie B virus serologies    37 weeks gestation of pregnancy     21 F APG 7/9 Wt 6#3       Today she is doing well without any chief complaint. Her lochia is light. She denies chest pain, shortness of breath, headache, lightheadedness and blurred vision. She is breast feeding and she denies any breast tenderness. She is ambulating well. Her voiding pattern is normal. I reviewed signs and symptoms of post partum depression with the patient, she currently denies any of these symptoms. She is tolerating solids.      Vital Signs:  Vitals:    21 0800 21 1200 21 1600 21   BP: 116/73   111/69   Pulse: 79 84 82 94   Resp: 16 16 20 16   Temp: 98.2 °F (36.8 °C) 98.6 °F (37 °C) 98 °F (36.7 °C) 97.5 °F (36.4 °C)   TempSrc: Oral Oral Oral Oral   Weight:       Height:             Physical Exam:  General:  no apparent distress, alert and cooperative  Neurologic:  alert, oriented, normal speech, no focal findings or movement disorder noted  Lungs:  No increased work of breathing, good air exchange, clear to auscultation bilaterally, no crackles or wheezing  Heart:  Normal apical impulse, regular rate and rhythm, normal S1 and S2, no S3 or S4, and no murmur noted    Abdomen: abdomen soft, non-distended, non-tender  Fundus: non-tender, firm, below umbilicus  Extremities:  no calf tenderness, non edematous    Lab:  Lab Results   Component Value Date    HGB 12.3 2021     Lab Results Component Value Date    HCT 36.7 2021       Assessment/Plan:  1. Jamal Blanco is a Beto Solo PPD # 1 s/p    - Doing well, VSS   - female infant in [address]   - Encourage ambulation   - Postpartum Hgb/Hct if symptomatic   2. Rh negative/Rubella immune  - Rhogam not indicated  3. Breast feeding    - Denies s/s mastitis    4. cHTN (no meds)   - VSS  - Denies s/s PreE  - PreE labs wnl, P/C 0.24 ()  5. BMI 43  6. Continue post partum care    Counseling Completed:  Secondary Smoke risks and Sudden Infant Death Syndrome were reviewed with recommendations. Infant sleeping, \"back to sleep\" and avoidance of co-sleeping recommendations were reviewed. Signs and Symptoms of Post Partum Depression were reviewed. The patient is to call if any occur. Signs and symptoms of Mastitis were reviewed. The patient is to call if any occur for follow up. Discharge instructions including pelvic rest, no driving with pain medicine and office follow-up were reviewed with patient     Attending Physician: Dr. Hayde Rueda,   Ob/Gyn Resident   2021, 1:02 AM            Attending Physician Statement  I have discussed the care of Jamal Blanco, including pertinent history and exam findings,  with the resident. I have seen and examined the patient and the key elements of all parts of the encounter have been performed by me. I agree with the assessment, plan and orders as documented by the resident. (GC Modifier)    Doing well. Pain controlled. Bleeding is light. Denies CP/SOB/F/CH/N/V/HA. She is breastfeeding. Vitals:    21 162000 21 0400 21 0835   BP:  111/69 115/65 119/75   Pulse: 82 94 90 86   Resp: 20 16 16 16   Temp: 98 °F (36.7 °C) 97.5 °F (36.4 °C) 98 °F (36.7 °C) 98.2 °F (36.8 °C)   TempSrc: Oral Oral Oral Oral   Weight:       Height:         No results found for this or any previous visit (from the past 24 hour(s)).      PPD#1 , girl  Rh neg - buttocks area in warm water. You can buy a plastic sitz bath at most [employer]. It will fit on your toilet. You can also use your bathtub. Diet    Eat a well balanced, healthy diet to help your recover from childbirth. If you are breastfeeding, you will need additional calories each day. You may also be advised to avoid certain foods. Some women experience constipation after childbirth. To avoid this problem:   · Drink plenty of fluids. · Eat foods high in fiber, such as:   ¨ Whole grain cereals and breads   ¨ Fruits and vegetables   ¨ Legumes (eg, beans, lentils)   Physical Activity    Labor and delivery is tiring. Rest when you can to regain your energy. Your doctor will encourage you to exercise by walking. Ask your doctor when you will be able to return to more strenuous exercise. Your doctor may suggest you do Kegel exercises to strengthen your pelvic muscles. To do these tighten your muscles as if you were stopping your urine flow. Hold for a few seconds and then relax. Do these throughout the day. Medications    Breastfeeding can cause your uterus to contract. If painful, your doctor may recommend a pain reliever. If you are breastfeeding, it's important to ask your doctor about taking medications. You may receive from the hospital a list of medications to avoid. Once your doctor has approved your medications, it's important to:   · Take your medication as directed. Do not change the amount or the schedule. · Do not stop taking them without talking to your doctor. · Do not share them. · Know what the results and side effects may be. Report bothersome side effects to your doctor. · Some drugs can be dangerous when mixed. Talk to a doctor or pharmacist if you are taking more than one drug. This includes over-the-counter medication and herb or dietary supplements. · Plan ahead for refills so you don't run out. Lifestyle Changes    Having a baby requires significant lifestyle changes. You and your doctor will plan lifestyle changes that will help you recover. Some things to keep in mind include:   · It is important to get enough rest so you can recover. Try sleeping when the baby sleeps. · Ask your doctor when you can resume sexual relations. If you have not done so already, talk to your doctor about birth control options. · If you are breastfeeding, consider a breast pump. · Call your obstetrician and/or pediatrician for any questions that arise. · Understand the changes your body is going through as it recovers from childbirth:   ¨ Hot and cold flashes as your body adjusts to new hormone and blood flow levels   ¨ Urinary or fecal incontinence due to stretched muscles   ¨ After pains from uterine contractions as the uterus shrinks   ¨ Vaginal bleeding (called lochia), which is heavier than your period (generally stops within two months)   ¨ Baby blues, feelings of irritability, sadness, crying, or anxiety. Postpartum depression is more severe, occurring in 10%-20% of new moms. If you experience such symptoms, contact your doctor. · Consider joining a support group for new mothers. You can get encouragement and learn parenting strategies. Follow-up   Schedule a follow-up appointment as directed by your doctor. Call Your Doctor If Any of the Following Occurs   It is important for you to monitor your recovery once you leave the hospital. That way, you can alert your doctor to any problems immediately.  If any of the following occurs, call your doctor:   · Signs of infection, including fever and chills    · Increased bleeding: soaking more than one sanitary pad an hour    · Wounds that become red, swollen or drain pus    · Vaginal discharge that smells foul    · New pain, swelling, or tenderness in your legs    · Pain that you can't control with the medications you've been given    · Pain, burning, urgency or frequency of urination, or persistent bleeding in the urine    · Cough,

## 2021-05-28 NOTE — LACTATION NOTE
Showed mom how to use side lying positioning,manual expression and releasing lower lip that was uncomfortable.

## 2021-06-01 LAB
FERN TESTING (POC): ABNORMAL
NITRAZINE PH (POC): POSITIVE

## 2021-06-09 ENCOUNTER — OFFICE VISIT (OUTPATIENT)
Dept: OBGYN CLINIC | Age: 21
End: 2021-06-09

## 2021-06-09 VITALS
DIASTOLIC BLOOD PRESSURE: 72 MMHG | WEIGHT: 246 LBS | SYSTOLIC BLOOD PRESSURE: 116 MMHG | BODY MASS INDEX: 39.53 KG/M2 | HEIGHT: 66 IN

## 2021-06-09 PROBLEM — O43.199 MARGINAL INSERTION OF UMBILICAL CORD AFFECTING MANAGEMENT OF MOTHER: Status: RESOLVED | Noted: 2021-01-27 | Resolved: 2021-06-09

## 2021-06-09 PROBLEM — O28.8 AFI (AMNIOTIC FLUID INDEX) DECREASED: Status: RESOLVED | Noted: 2021-04-21 | Resolved: 2021-06-09

## 2021-06-09 PROBLEM — O35.07X0 FETAL MICROCEPHALY: Status: RESOLVED | Noted: 2021-04-21 | Resolved: 2021-06-09

## 2021-06-09 PROBLEM — Z67.91 RH NEGATIVE STATE IN ANTEPARTUM PERIOD: Status: RESOLVED | Noted: 2020-11-18 | Resolved: 2021-06-09

## 2021-06-09 PROBLEM — O26.899 RH NEGATIVE STATE IN ANTEPARTUM PERIOD: Status: RESOLVED | Noted: 2020-11-18 | Resolved: 2021-06-09

## 2021-06-09 PROBLEM — O99.820 GBS (GROUP B STREPTOCOCCUS CARRIER), +RV CULTURE, CURRENTLY PREGNANT: Status: RESOLVED | Noted: 2020-11-23 | Resolved: 2021-06-09

## 2021-06-09 PROBLEM — Z3A.37 37 WEEKS GESTATION OF PREGNANCY: Status: RESOLVED | Noted: 2021-05-26 | Resolved: 2021-06-09

## 2021-06-09 PROCEDURE — 0502F SUBSEQUENT PRENATAL CARE: CPT | Performed by: NURSE PRACTITIONER

## 2021-06-09 NOTE — PROGRESS NOTES
Charlie Welch  2021  11:02 AM        Charlie Welch is a 24 y.o. female       The patient was seen. She has no chief complaints today. She delivered vaginally on 2021. She is  breast feeding and there is not any signs or symptoms of mastitis. The patient completed the E.P.D.S. Evaluation form and scored 1. She does not have any signs or symptoms of post partum depression. She denies any suicidal thoughts with a plan, intent to harm others and delusional ideas. Today her lochia is light she denies any dizziness or shortness of breath. Her pregnancy was complicated by:  Patient Active Problem List    Diagnosis Date Noted    Positive Coxsackie B virus serologies 2021     Priority: High    Lipoma of Vulva/Right Labia 2020     Priority: High     Overview Note:     GYN ONC Referral       21 F APG 7/9 Wt 6#3 2021    ASA 81 mg/dy per MFM 2020         She does admit to having good home support. OB History    Para Term  AB Living   1 1 1 0 0 1   SAB TAB Ectopic Molar Multiple Live Births   0 0 0 0 0 1      # Outcome Date GA Lbr John/2nd Weight Sex Delivery Anes PTL Lv   1 Term 21 37w6d / 00:59 6 lb 3.5 oz (2.82 kg) F Vag-Spont EPI N OPHELIA      Name: Seema Dull: 7  Apgar5: 9       Patient Active Problem List   Diagnosis    Lipoma of Vulva/Right Labia    ASA 81 mg/dy per MFM    Positive Coxsackie B virus serologies     21 F APG 7/9 Wt 6#3       Blood pressure 116/72, height 5' 6\" (1.676 m), weight 246 lb (111.6 kg), currently breastfeeding. Abdomen: Soft and non-tender; good bowel sounds; no guarding, rebound or rigidity; no CVA tenderness bilaterally. Extremities: No calf tenderness bilaterally. DTR 2/4 bilaterally. No edema. Perineum: intact    Assessment:   Diagnosis Orders   1.  Postpartum care and examination       Chief Complaint   Patient presents with   [facility] Postpartum Care Patient Active Problem List    Diagnosis Date Noted    Positive Coxsackie B virus serologies 2021     Priority: High    Lipoma of Vulva/Right Labia 2020     Priority: High     GYN ONC Referral       21 F APG  Wt 6#3 2021    ASA 81 mg/dy per MFM 2020         EPDS Score of 1        Plan:  1. Return to the office in  3-4 weeks  2. Signs & Symptoms of mastitis reviewed; notify if occurs  3. Secondary smoke risks reviewed. Increased risks of respiratory problems, Sudden     infant death syndrome, and potential malignancies. 4. Abstinence  5. Family planning counseling and STD counseling completed  6. Return in about 4 weeks (around 2021) for 6 week pp. Patient was seen with total face to face time of 15 minutes. More than 50% of this visit was on counseling and education regarding her    Diagnosis Orders   1. Postpartum care and examination      and her options. She was also counseled on her preventative health maintenance recommendations and follow-up.

## 2021-07-07 ENCOUNTER — OFFICE VISIT (OUTPATIENT)
Dept: OBGYN CLINIC | Age: 21
End: 2021-07-07

## 2021-07-07 VITALS
HEIGHT: 66 IN | HEART RATE: 80 BPM | SYSTOLIC BLOOD PRESSURE: 120 MMHG | DIASTOLIC BLOOD PRESSURE: 82 MMHG | BODY MASS INDEX: 39.21 KG/M2 | WEIGHT: 244 LBS

## 2021-07-07 PROCEDURE — 0503F POSTPARTUM CARE VISIT: CPT | Performed by: NURSE PRACTITIONER

## 2021-07-07 SDOH — ECONOMIC STABILITY: FOOD INSECURITY: WITHIN THE PAST 12 MONTHS, YOU WORRIED THAT YOUR FOOD WOULD RUN OUT BEFORE YOU GOT MONEY TO BUY MORE.: NEVER TRUE

## 2021-07-07 SDOH — ECONOMIC STABILITY: FOOD INSECURITY: WITHIN THE PAST 12 MONTHS, THE FOOD YOU BOUGHT JUST DIDN'T LAST AND YOU DIDN'T HAVE MONEY TO GET MORE.: NEVER TRUE

## 2021-07-07 ASSESSMENT — SOCIAL DETERMINANTS OF HEALTH (SDOH): HOW HARD IS IT FOR YOU TO PAY FOR THE VERY BASICS LIKE FOOD, HOUSING, MEDICAL CARE, AND HEATING?: NOT HARD AT ALL

## 2021-07-07 ASSESSMENT — PATIENT HEALTH QUESTIONNAIRE - PHQ9
SUM OF ALL RESPONSES TO PHQ9 QUESTIONS 1 & 2: 0
SUM OF ALL RESPONSES TO PHQ QUESTIONS 1-9: 0
1. LITTLE INTEREST OR PLEASURE IN DOING THINGS: 0
2. FEELING DOWN, DEPRESSED OR HOPELESS: 0
SUM OF ALL RESPONSES TO PHQ QUESTIONS 1-9: 0
SUM OF ALL RESPONSES TO PHQ QUESTIONS 1-9: 0

## 2021-07-07 NOTE — PROGRESS NOTES
Segundo Childers is a 24 y.o. female    Delivery Information:  Delivery Date: 2021    Sex of Baby: female  Type of Delivery: Vaginal  Delivering Physician:     Post Partum Questions:  No Do you Smoke? If yes PPD is NA  Yes Do you intake caffeine? No Do you use street drugs? No Do you consume alcohol? Yes Are breast feeding? No Are you bottle feeding? No Are you having any problems with your breasts? (lumps, discharge, asymmetry, or redness)  No Are you having any problems with bowel movements or urination? No Are you having any vaginal discharge/itching/ or burning? yes Are you getting help and support with the baby? No Have you had intercourse since your delivery? No If you had intercourse did you use condoms? No Have you had a menstrual cycle since delivery? Date: NA  No Do you have any questions that need to be addressed today? How long did you bleed after your delivery? 4 Weeks  What are your plans besides condoms for family planning? Nothing. Partner considering vasectomy. Who lives at home with you and your baby? Dad and stepson    The patient was counseled on the risks of tobacco abuse and the harm it may cause to the patient and her  baby as well as others in the household from secondary smoke exposure. The patient was counseled on the risks of potential respiratory problems, cancers, and sudden infant death syndrome as well as other co-morbidities. These were discussed in detail. I reviewed cessation options and plans. The patient was counseled on smoking outdoors with appropriate covers of clothing and hair. She was counseled on hand washing prior to holding or picking up the baby. The patient had her questions answered in regards to the baby and was instructed to follow up with her pediatrician. She had reviewed with her safe sleep recommendations.     Vitals:    21 1022   BP: 120/82   Site: Right Upper Arm   Position: Sitting   Cuff Size: Small Adult   Pulse: 80   Weight: 244 lb (110.7 kg)   Height: 5' 6\" (1.676 m)        Physical Exam:  Chaperone for Intimate Exam   Chaperone was offered and accepted as part of the rooming process.  Chaperone: [facility]       General Appearance: This  is a well Developed, well Nourished, well groomed female. Her BMI was reviewed. Nutritional decision making was discussed. Skin:  There was a Normal Inspection of the skin without rashes or lesions. There were no rashes. (Papular, Maculopapular, Hives, Pustular, Macular)     There were no lesions (Ulcers, Erythema, Abn. Appearing Nevi)            Lymphatic:  No Lymph Nodes were Palpable in the neck , axilla or groin.  0 # Of Lymph Nodes; Location ; Character [Normal]  [Shotty] [Tender] [Enlarged]     Neck and EENT:  The neck was supple. There were no masses   The thyroid was not enlarged and had no masses. Perrla, EOMI B/L, TMI B/L No Abnormalities. Throat inspected-No exudates or Masses, Nares Patent No Masses        Respiratory: The lungs were auscultated and found to be clear. There were no rales, rhonchi or wheezes. There was a good respiratory effort. Cardiovascular: The heart was in a regular rate and rhythm. . No S3 or S4. There was no murmur appreciated. Location, grade, and radiation are not applicable. Extremities: The patients extremities were without calf tenderness, edema, or varicosities. There was full range of motion in all four extremities. Pulses in all four extremities were appreciated and are 2/4. Abdomen: The abdomen was soft and non-tender. There were good bowel sounds in all quadrants and there was no guarding, rebound or rigidity. On evaluation there was no evidence of hepatosplenomegaly and there was no costal vertebral trisha tenderness bilaterally. No hernias were appreciated. Abdominal Scars: intact    Psych:   The patient had a normal Orientation to: Time, Place, Person, and Situation  There is no Mood / Affect

## 2021-08-19 ENCOUNTER — TELEPHONE (OUTPATIENT)
Dept: GYNECOLOGIC ONCOLOGY | Age: 21
End: 2021-08-19

## 2021-08-19 NOTE — TELEPHONE ENCOUNTER
Called pt to see if she was on her way to appt scheduled today at 8:30am. Unable to leave message phone states person is unavailable to take calls at this time.

## 2021-09-16 ENCOUNTER — APPOINTMENT (OUTPATIENT)
Dept: CT IMAGING | Age: 21
End: 2021-09-16
Payer: COMMERCIAL

## 2021-09-16 ENCOUNTER — HOSPITAL ENCOUNTER (EMERGENCY)
Age: 21
Discharge: HOME | End: 2021-09-16
Attending: EMERGENCY MEDICINE
Payer: COMMERCIAL

## 2021-09-16 VITALS
BODY MASS INDEX: 41.78 KG/M2 | HEIGHT: 66 IN | SYSTOLIC BLOOD PRESSURE: 121 MMHG | HEART RATE: 55 BPM | WEIGHT: 260 LBS | TEMPERATURE: 98 F | OXYGEN SATURATION: 97 % | RESPIRATION RATE: 16 BRPM | DIASTOLIC BLOOD PRESSURE: 84 MMHG

## 2021-09-16 DIAGNOSIS — K81.0 ACUTE CHOLECYSTITIS: Primary | ICD-10-CM

## 2021-09-16 LAB
ABSOLUTE EOS #: 0.08 K/UL (ref 0–0.44)
ABSOLUTE IMMATURE GRANULOCYTE: 0.06 K/UL (ref 0–0.3)
ABSOLUTE LYMPH #: 2.19 K/UL (ref 1.1–3.7)
ABSOLUTE MONO #: 0.85 K/UL (ref 0.1–1.4)
ALBUMIN SERPL-MCNC: 4.5 G/DL (ref 3.5–5.2)
ALBUMIN/GLOBULIN RATIO: 2 (ref 1–2.5)
ALP BLD-CCNC: 76 U/L (ref 35–104)
ALT SERPL-CCNC: 23 U/L (ref 5–33)
ANION GAP SERPL CALCULATED.3IONS-SCNC: 12 MMOL/L (ref 9–17)
AST SERPL-CCNC: 13 U/L
BASOPHILS # BLD: 0 % (ref 0–2)
BASOPHILS ABSOLUTE: 0.04 K/UL (ref 0–0.2)
BILIRUB SERPL-MCNC: 0.32 MG/DL (ref 0.3–1.2)
BUN BLDV-MCNC: 10 MG/DL (ref 6–20)
BUN/CREAT BLD: ABNORMAL (ref 9–20)
CALCIUM SERPL-MCNC: 9.1 MG/DL (ref 8.6–10.4)
CHLORIDE BLD-SCNC: 105 MMOL/L (ref 98–107)
CO2: 22 MMOL/L (ref 20–31)
CREAT SERPL-MCNC: 0.73 MG/DL (ref 0.5–0.9)
DIFFERENTIAL TYPE: ABNORMAL
EOSINOPHILS RELATIVE PERCENT: 1 % (ref 1–4)
GFR AFRICAN AMERICAN: >60 ML/MIN
GFR NON-AFRICAN AMERICAN: >60 ML/MIN
GFR SERPL CREATININE-BSD FRML MDRD: ABNORMAL ML/MIN/{1.73_M2}
GFR SERPL CREATININE-BSD FRML MDRD: ABNORMAL ML/MIN/{1.73_M2}
GLUCOSE BLD-MCNC: 115 MG/DL (ref 70–99)
HCG QUALITATIVE: NEGATIVE
HCT VFR BLD CALC: 39.2 % (ref 36.3–47.1)
HEMOGLOBIN: 13.3 G/DL (ref 11.9–15.1)
IMMATURE GRANULOCYTES: 0 %
LIPASE: 18 U/L (ref 13–60)
LYMPHOCYTES # BLD: 16 % (ref 25–45)
MCH RBC QN AUTO: 28.3 PG (ref 25.2–33.5)
MCHC RBC AUTO-ENTMCNC: 33.9 G/DL (ref 28.4–34.8)
MCV RBC AUTO: 83.4 FL (ref 82.6–102.9)
MONOCYTES # BLD: 6 % (ref 2–8)
NRBC AUTOMATED: 0 PER 100 WBC
PDW BLD-RTO: 12.5 % (ref 11.8–14.4)
PLATELET # BLD: 247 K/UL (ref 138–453)
PLATELET ESTIMATE: ABNORMAL
PMV BLD AUTO: 11.1 FL (ref 8.1–13.5)
POTASSIUM SERPL-SCNC: 3.8 MMOL/L (ref 3.7–5.3)
RBC # BLD: 4.7 M/UL (ref 3.95–5.11)
RBC # BLD: ABNORMAL 10*6/UL
SEG NEUTROPHILS: 77 % (ref 34–64)
SEGMENTED NEUTROPHILS ABSOLUTE COUNT: 10.2 K/UL (ref 1.5–8.1)
SODIUM BLD-SCNC: 139 MMOL/L (ref 135–144)
TOTAL PROTEIN: 6.7 G/DL (ref 6.4–8.3)
WBC # BLD: 13.4 K/UL (ref 4.5–13.5)
WBC # BLD: ABNORMAL 10*3/UL

## 2021-09-16 PROCEDURE — 80053 COMPREHEN METABOLIC PANEL: CPT

## 2021-09-16 PROCEDURE — 6360000004 HC RX CONTRAST MEDICATION: Performed by: STUDENT IN AN ORGANIZED HEALTH CARE EDUCATION/TRAINING PROGRAM

## 2021-09-16 PROCEDURE — 74177 CT ABD & PELVIS W/CONTRAST: CPT

## 2021-09-16 PROCEDURE — 84703 CHORIONIC GONADOTROPIN ASSAY: CPT

## 2021-09-16 PROCEDURE — 85025 COMPLETE CBC W/AUTO DIFF WBC: CPT

## 2021-09-16 PROCEDURE — 99284 EMERGENCY DEPT VISIT MOD MDM: CPT

## 2021-09-16 PROCEDURE — 83690 ASSAY OF LIPASE: CPT

## 2021-09-16 PROCEDURE — 6360000002 HC RX W HCPCS: Performed by: STUDENT IN AN ORGANIZED HEALTH CARE EDUCATION/TRAINING PROGRAM

## 2021-09-16 PROCEDURE — 96375 TX/PRO/DX INJ NEW DRUG ADDON: CPT

## 2021-09-16 PROCEDURE — 96374 THER/PROPH/DIAG INJ IV PUSH: CPT

## 2021-09-16 RX ORDER — ONDANSETRON 2 MG/ML
4 INJECTION INTRAMUSCULAR; INTRAVENOUS ONCE
Status: COMPLETED | OUTPATIENT
Start: 2021-09-16 | End: 2021-09-16

## 2021-09-16 RX ORDER — FENTANYL CITRATE 50 UG/ML
50 INJECTION, SOLUTION INTRAMUSCULAR; INTRAVENOUS ONCE
Status: COMPLETED | OUTPATIENT
Start: 2021-09-16 | End: 2021-09-16

## 2021-09-16 RX ADMIN — IOPAMIDOL 75 ML: 755 INJECTION, SOLUTION INTRAVENOUS at 04:10

## 2021-09-16 RX ADMIN — ONDANSETRON 4 MG: 2 INJECTION INTRAMUSCULAR; INTRAVENOUS at 03:29

## 2021-09-16 RX ADMIN — FENTANYL CITRATE 50 MCG: 50 INJECTION, SOLUTION INTRAMUSCULAR; INTRAVENOUS at 03:30

## 2021-09-16 ASSESSMENT — ENCOUNTER SYMPTOMS
SHORTNESS OF BREATH: 0
ABDOMINAL PAIN: 1
VOMITING: 1
BACK PAIN: 0
SORE THROAT: 0
DIARRHEA: 0
COUGH: 0
NAUSEA: 1

## 2021-09-16 ASSESSMENT — PAIN SCALES - GENERAL: PAINLEVEL_OUTOF10: 6

## 2021-09-16 NOTE — ED PROVIDER NOTES
[address]  ED  Emergency Department Encounter  EmergencyMedicine Resident     Pt Melchor Covington  MRN: [id number]  [place] 2000  Date of evaluation: 9/16/21  PCP:  GORDON Lizarraga CNP    This patient was evaluated in the Emergency Department for symptoms described in the history of present illness. The patient was evaluated in the context of the global COVID-19 pandemic, which necessitated consideration that the patient might be at risk for infection with the SARS-CoV-2 virus that causes COVID-19. Institutional protocols and algorithms that pertain to the evaluation of patients at risk for COVID-19 are in a state of rapid change based on information released by regulatory bodies including the CDC and federal and state organizations. These policies and algorithms were followed during the patient's care in the ED. CHIEF COMPLAINT       Chief Complaint   Patient presents with    Abdominal Pain       HISTORY OF PRESENT ILLNESS  (Location/Symptom, Timing/Onset, Context/Setting, Quality, Duration, Modifying Factors, Severity.)      Magalie Johnson is a 24 y.o. female who presents with 2-day history of right upper quadrant abdominal pain, patient states that this is worse after she eats. She is also 3 months postpartum, states that she had a grossly unremarkable pregnancy with preeclampsia that has resolved after giving birth. Patient has been following up with OB/GYN and has been going well. Denies history of cholecystitis or cholelithiasis. Patient states that she has vomited twice today, is a little nauseous, and constipated. No previous abdominal surgery, birth was done vaginally. Denies dysuria, hematuria, fevers, chills, chest pain or shortness of breath at this time. Received first dose of Covid vaccine, awaiting second. Currently been breast-feeding.     PAST MEDICAL / SURGICAL / SOCIAL / FAMILY HISTORY      has a past medical history of No pertinent past medical history. has a past surgical history that includes [place] tooth extraction. Social History     Socioeconomic History    Marital status: Single     Spouse name: Not on file    Number of children: Not on file    Years of education: Not on file    Highest education level: Not on file   Occupational History    Not on file   Tobacco Use    Smoking status: Never Smoker    Smokeless tobacco: Never Used   Vaping Use    Vaping Use: Never used   Substance and Sexual Activity    Alcohol use: Not Currently    Drug use: Never    Sexual activity: Yes     Partners: Male   Other Topics Concern    Not on file   Social History Narrative    Not on file     Social Determinants of Health     Financial Resource Strain: Low Risk     Difficulty of Paying Living Expenses: Not hard at all   Food Insecurity: No Food Insecurity    Worried About Running Out of Food in the Last Year: Never true    [address] in the Last Year: Never true   Transportation Needs:     Lack of Transportation (Medical):  Lack of Transportation (Non-Medical):    Physical Activity:     Days of Exercise per Week:     Minutes of Exercise per Session:    Stress:     Feeling of Stress :    Social Connections:     Frequency of Communication with Friends and Family:     Frequency of Social Gatherings with Friends and Family:     Attends [employer] Services:     Active Member of Clubs or Organizations:     Attends Club or Organization Meetings:     Marital Status:    Intimate Partner Violence:     Fear of Current or Ex-Partner:     Emotionally Abused:     Physically Abused:     Sexually Abused:        Family History   Problem Relation Age of Onset    Diabetes Maternal Grandmother        Allergies:  Patient has no known allergies.     Home Medications:  Prior to Admission medications    Not on File       REVIEW OF SYSTEMS    (2-9 systems for level 4, 10 or more for level 5)      Review of Systems   Constitutional: Negative for chills and fever. HENT: Negative for sore throat. Eyes: Negative for visual disturbance. Respiratory: Negative for cough and shortness of breath. Cardiovascular: Negative for chest pain. Gastrointestinal: Positive for abdominal pain, nausea and vomiting. Negative for diarrhea. Endocrine: Negative for polyuria. Genitourinary: Negative for dysuria. Musculoskeletal: Negative for back pain. Neurological: Negative for light-headedness and headaches. Psychiatric/Behavioral: Negative for confusion. PHYSICAL EXAM   (up to 7 for level 4, 8 or more for level 5)      INITIAL VITALS:   /73   Pulse 55   Temp 98 °F (36.7 °C) (Oral)   Resp 16   Ht 5' 6\" (1.676 m)   Wt 260 lb (117.9 kg)   SpO2 96%   BMI 41.97 kg/m²     Physical Exam  Constitutional:       Appearance: She is well-developed. HENT:      Head: Normocephalic. Mouth/Throat:      Mouth: Mucous membranes are moist.   Eyes:      Extraocular Movements: Extraocular movements intact. Pupils: Pupils are equal, round, and reactive to light. Cardiovascular:      Rate and Rhythm: Normal rate and regular rhythm. Pulmonary:      Effort: Pulmonary effort is normal.      Breath sounds: Normal breath sounds. Abdominal:      General: Abdomen is protuberant. Bowel sounds are normal. There is no distension. Palpations: Abdomen is soft. Tenderness: There is abdominal tenderness in the right upper quadrant. There is no right CVA tenderness or left CVA tenderness. Positive signs include Lara's sign. Hernia: No hernia is present. Skin:     General: Skin is warm. Capillary Refill: Capillary refill takes less than 2 seconds. Neurological:      General: No focal deficit present. Mental Status: She is alert and oriented to person, place, and time.    Psychiatric:         Mood and Affect: Mood normal.       DIFFERENTIAL  DIAGNOSIS     PLAN (LABS / IMAGING / EKG):  Orders Placed This Encounter   Procedures    CT ABDOMEN PELVIS W IV CONTRAST Additional Contrast? None    CBC WITH AUTO DIFFERENTIAL    COMPREHENSIVE METABOLIC PANEL    LIPASE    HCG Qualitative, Serum       MEDICATIONS ORDERED:  Orders Placed This Encounter   Medications    fentaNYL (SUBLIMAZE) injection 50 mcg    ondansetron (ZOFRAN) injection 4 mg    iopamidol (ISOVUE-370) 76 % injection 75 mL       DDX: Cholecystitis, cholelithiasis, ascending cholangitis, acute pancreatitis, constipation    DIAGNOSTIC RESULTS / EMERGENCY DEPARTMENT COURSE / MDM   LAB RESULTS:  Results for orders placed or performed during the hospital encounter of 09/16/21   CBC WITH AUTO DIFFERENTIAL   Result Value Ref Range    WBC 13.4 4.5 - 13.5 k/uL    RBC 4.70 3.95 - 5.11 m/uL    Hemoglobin 13.3 11.9 - 15.1 g/dL    Hematocrit 39.2 36.3 - 47.1 %    MCV 83.4 82.6 - 102.9 fL    MCH 28.3 25.2 - 33.5 pg    MCHC 33.9 28.4 - 34.8 g/dL    RDW 12.5 11.8 - 14.4 %    Platelets 624 563 - 457 k/uL    MPV 11.1 8.1 - 13.5 fL    NRBC Automated 0.0 0.0 per 100 WBC    Differential Type NOT REPORTED     Seg Neutrophils 77 (H) 34 - 64 %    Lymphocytes 16 (L) 25 - 45 %    Monocytes 6 2 - 8 %    Eosinophils % 1 1 - 4 %    Basophils 0 0 - 2 %    Immature Granulocytes 0 0 %    Segs Absolute 10.20 (H) 1.50 - 8.10 k/uL    Absolute Lymph # 2.19 1.10 - 3.70 k/uL    Absolute Mono # 0.85 0.10 - 1.40 k/uL    Absolute Eos # 0.08 0.00 - 0.44 k/uL    Basophils Absolute 0.04 0.00 - 0.20 k/uL    Absolute Immature Granulocyte 0.06 0.00 - 0.30 k/uL    WBC Morphology NOT REPORTED     RBC Morphology NOT REPORTED     Platelet Estimate NOT REPORTED    COMPREHENSIVE METABOLIC PANEL   Result Value Ref Range    Glucose 115 (H) 70 - 99 mg/dL    BUN 10 6 - 20 mg/dL    CREATININE 0.73 0.50 - 0.90 mg/dL    Bun/Cre Ratio NOT REPORTED 9 - 20    Calcium 9.1 8.6 - 10.4 mg/dL    Sodium 139 135 - 144 mmol/L    Potassium 3.8 3.7 - 5.3 mmol/L    Chloride 105 98 - 107 mmol/L    CO2 22 20 - 31 mmol/L    Anion Gap 12 9 - 17 mmol/L Alkaline Phosphatase 76 35 - 104 U/L    ALT 23 5 - 33 U/L    AST 13 <32 U/L    Total Bilirubin 0.32 0.3 - 1.2 mg/dL    Total Protein 6.7 6.4 - 8.3 g/dL    Albumin 4.5 3.5 - 5.2 g/dL    Albumin/Globulin Ratio 2.0 1.0 - 2.5    GFR Non-African American >60 >60 mL/min    GFR African American >60 >60 mL/min    GFR Comment          GFR Staging NOT REPORTED    LIPASE   Result Value Ref Range    Lipase 18 13 - 60 U/L   HCG Qualitative, Serum   Result Value Ref Range    hCG Qual NEGATIVE NEGATIVE     IMPRESSION: 42-year-old lady presents to the emergency department with 2-day history of right upper quadrant abdominal pain that is worsened after she eats, with 2 episodes of nonbloody emesis today. Patient is 3 months postpartum from an uncomplicated vaginal delivery with an uncomplicated pregnancy and currently breast-feeding. Physical exam does show right upper quadrant tenderness with mild Lara sign, otherwise grossly unremarkable. Labs grossly unremarkable, CT Abdo pelvis showing acute cholecystitis. Shared decision with patient with regards to follow-up with general surgery outpatient for elective intervention if necessary. Discussed with patient with regards to follow-up with primary care doctor and for strict return precautions, patient verbalized agreement understanding. Stable for discharge. RADIOLOGY:  See radiology report    EMERGENCY DEPARTMENT COURSE:  ED Course as of Sep 16 0526   Thu Sep 16, 2021   0509 CT abdo pelvis  Findings consistent with acute cholecystitis. If further evaluation is  necessary, ultrasound could be performed.     Unchanged incidental right labia majora lipoma. [EM]   1611 Discussed with patient with regards to acute cholecystitis diagnosis and general surgery follow-up. Patient states that this time she would rather follow-up as outpatient and schedule her own appointment instead of waiting to see surgeons in the emergency department. Patient has decision-making capacity. [EM]      ED Course User Index  [EM] Peter Ramírez MD      PROCEDURES:  None    CONSULTS:  None    FINAL IMPRESSION      1.  Acute cholecystitis          DISPOSITION / PLAN     DISPOSITION      PATIENT REFERRED TO:  GORDON Pickering CNP  [address] [phone]    Schedule an appointment as soon as possible for a visit   For follow up    [facility]  [address] [address]  [phone]  Go to   As needed    [address] [facility] [address] [place] [id number]  Schedule an appointment as soon as possible for a visit   For follow up      DISCHARGE MEDICATIONS:  New Prescriptions    No medications on file       Peter Ramírez MD  Emergency Medicine Resident    (Please note that portions of thisnote were completed with a voice recognition program.  Efforts were made to edit the dictations but occasionally words are mis-transcribed.)       Peter Ramírez MD  Resident  09/16/21 5226

## 2021-09-16 NOTE — ED PROVIDER NOTES
[facility]     Emergency Department     Faculty Attestation    I performed a history and physical examination of the patient and discussed management with the resident. I have reviewed and agree with the residents findings including all diagnostic interpretations, and treatment plans as written. Any areas of disagreement are noted on the chart. I was personally present for the key portions of any procedures. I have documented in the chart those procedures where I was not present during the key portions. I have reviewed the emergency nurses triage note. I agree with the chief complaint, past medical history, past surgical history, allergies, medications, social and family history as documented unless otherwise noted below. Documentation of the HPI, Physical Exam and Medical Decision Making performed by scribalexandria is based on my personal performance of the HPI, PE and MDM. For Physician Assistant/ Nurse Practitioner cases/documentation I have personally evaluated this patient and have completed at least one if not all key elements of the E/M (history, physical exam, and MDM). Additional findings are as noted. 23 yo F ruq abdominal pain worse with food, no fever, no injury,   pe vss :Olivia Pino RN escort for exam:  Tender ruq, guarding, no rebound, no mass, no distension,     Lab stable, ct > Ac Cholecystitis, pt not wishing to stay, s/s improved, pt wishing to follow as out pt, pt tolerating liquids,   Pt will follow with surgery clinic,     EKG Interpretation    Interpreted by me      CRITICAL CARE: There was a high probability of clinically significant/life threatening deterioration in this patient's condition which required my urgent intervention. Total critical care time was 5 minutes. This excludes any time for separately reportable procedures.        [facility] Hospital[facility]os[facility] [address], DO  09/16/21 8279       [facility],   09/16/21 [address] [place] [address] Lul Jhaveri DO  09/16/21 1874

## 2021-09-28 ENCOUNTER — HOSPITAL ENCOUNTER (INPATIENT)
Age: 21
LOS: 2 days | Discharge: HOME | DRG: 419 | End: 2021-10-01
Attending: EMERGENCY MEDICINE | Admitting: SURGERY
Payer: COMMERCIAL

## 2021-09-28 DIAGNOSIS — K81.0 ACUTE CHOLECYSTITIS: Primary | ICD-10-CM

## 2021-09-28 LAB
ABSOLUTE EOS #: 0 K/UL (ref 0–0.4)
ABSOLUTE IMMATURE GRANULOCYTE: 0 K/UL (ref 0–0.3)
ABSOLUTE LYMPH #: 0.52 K/UL (ref 1–4.8)
ABSOLUTE MONO #: 0.33 K/UL (ref 0.1–1.4)
ALBUMIN SERPL-MCNC: 4.8 G/DL (ref 3.5–5.2)
ALBUMIN/GLOBULIN RATIO: 2.1 (ref 1–2.5)
ALP BLD-CCNC: 166 U/L (ref 35–104)
ALT SERPL-CCNC: 720 U/L (ref 5–33)
ANION GAP SERPL CALCULATED.3IONS-SCNC: 14 MMOL/L (ref 9–17)
AST SERPL-CCNC: 576 U/L
BASOPHILS # BLD: 1 % (ref 0–2)
BASOPHILS ABSOLUTE: 0.07 K/UL (ref 0–0.2)
BILIRUB SERPL-MCNC: 3.71 MG/DL (ref 0.3–1.2)
BUN BLDV-MCNC: 9 MG/DL (ref 6–20)
BUN/CREAT BLD: ABNORMAL (ref 9–20)
CALCIUM SERPL-MCNC: 9.5 MG/DL (ref 8.6–10.4)
CHLORIDE BLD-SCNC: 104 MMOL/L (ref 98–107)
CO2: 21 MMOL/L (ref 20–31)
CREAT SERPL-MCNC: 0.6 MG/DL (ref 0.5–0.9)
DIFFERENTIAL TYPE: ABNORMAL
EOSINOPHILS RELATIVE PERCENT: 0 % (ref 1–4)
GFR AFRICAN AMERICAN: >60 ML/MIN
GFR NON-AFRICAN AMERICAN: >60 ML/MIN
GFR SERPL CREATININE-BSD FRML MDRD: ABNORMAL ML/MIN/{1.73_M2}
GFR SERPL CREATININE-BSD FRML MDRD: ABNORMAL ML/MIN/{1.73_M2}
GLUCOSE BLD-MCNC: 118 MG/DL (ref 70–99)
HCT VFR BLD CALC: 41.2 % (ref 36.3–47.1)
HEMOGLOBIN: 13.9 G/DL (ref 11.9–15.1)
IMMATURE GRANULOCYTES: 0 %
LIPASE: 29 U/L (ref 13–60)
LYMPHOCYTES # BLD: 8 % (ref 25–45)
MCH RBC QN AUTO: 28.7 PG (ref 25.2–33.5)
MCHC RBC AUTO-ENTMCNC: 33.7 G/DL (ref 28.4–34.8)
MCV RBC AUTO: 84.9 FL (ref 82.6–102.9)
MONOCYTES # BLD: 5 % (ref 2–8)
MORPHOLOGY: NORMAL
NRBC AUTOMATED: 0 PER 100 WBC
PDW BLD-RTO: 12.4 % (ref 11.8–14.4)
PLATELET # BLD: 242 K/UL (ref 138–453)
PLATELET ESTIMATE: ABNORMAL
PMV BLD AUTO: 11.6 FL (ref 8.1–13.5)
POTASSIUM SERPL-SCNC: 3.9 MMOL/L (ref 3.7–5.3)
RBC # BLD: 4.85 M/UL (ref 3.95–5.11)
RBC # BLD: ABNORMAL 10*6/UL
SEG NEUTROPHILS: 86 % (ref 34–64)
SEGMENTED NEUTROPHILS ABSOLUTE COUNT: 5.58 K/UL (ref 1.8–7.7)
SODIUM BLD-SCNC: 139 MMOL/L (ref 135–144)
TOTAL PROTEIN: 7.1 G/DL (ref 6.4–8.3)
WBC # BLD: 6.5 K/UL (ref 4.5–13.5)
WBC # BLD: ABNORMAL 10*3/UL

## 2021-09-28 PROCEDURE — 96375 TX/PRO/DX INJ NEW DRUG ADDON: CPT

## 2021-09-28 PROCEDURE — 99284 EMERGENCY DEPT VISIT MOD MDM: CPT

## 2021-09-28 PROCEDURE — 83690 ASSAY OF LIPASE: CPT

## 2021-09-28 PROCEDURE — 85025 COMPLETE CBC W/AUTO DIFF WBC: CPT

## 2021-09-28 PROCEDURE — 84703 CHORIONIC GONADOTROPIN ASSAY: CPT

## 2021-09-28 PROCEDURE — 6360000002 HC RX W HCPCS: Performed by: HEALTH CARE PROVIDER

## 2021-09-28 PROCEDURE — 80053 COMPREHEN METABOLIC PANEL: CPT

## 2021-09-28 PROCEDURE — 6360000002 HC RX W HCPCS: Performed by: EMERGENCY MEDICINE

## 2021-09-28 PROCEDURE — 96374 THER/PROPH/DIAG INJ IV PUSH: CPT

## 2021-09-28 RX ORDER — ONDANSETRON 2 MG/ML
4 INJECTION INTRAMUSCULAR; INTRAVENOUS ONCE
Status: COMPLETED | OUTPATIENT
Start: 2021-09-28 | End: 2021-09-28

## 2021-09-28 RX ORDER — FENTANYL CITRATE 50 UG/ML
100 INJECTION, SOLUTION INTRAMUSCULAR; INTRAVENOUS ONCE
Status: COMPLETED | OUTPATIENT
Start: 2021-09-28 | End: 2021-09-28

## 2021-09-28 RX ADMIN — FENTANYL CITRATE 100 MCG: 50 INJECTION, SOLUTION INTRAMUSCULAR; INTRAVENOUS at 22:57

## 2021-09-28 RX ADMIN — ONDANSETRON 4 MG: 2 INJECTION, SOLUTION INTRAMUSCULAR; INTRAVENOUS at 22:59

## 2021-09-28 ASSESSMENT — ENCOUNTER SYMPTOMS
ABDOMINAL PAIN: 1
VOMITING: 0
NAUSEA: 0
BACK PAIN: 0
SHORTNESS OF BREATH: 0
TROUBLE SWALLOWING: 0

## 2021-09-28 ASSESSMENT — PAIN DESCRIPTION - LOCATION
LOCATION: ABDOMEN
LOCATION: ABDOMEN

## 2021-09-28 ASSESSMENT — PAIN SCALES - GENERAL
PAINLEVEL_OUTOF10: 9

## 2021-09-28 ASSESSMENT — PAIN DESCRIPTION - ORIENTATION: ORIENTATION: RIGHT;UPPER

## 2021-09-28 ASSESSMENT — PAIN DESCRIPTION - PAIN TYPE: TYPE: ACUTE PAIN;CHRONIC PAIN

## 2021-09-28 ASSESSMENT — PAIN DESCRIPTION - DESCRIPTORS: DESCRIPTORS: SHARP;CONSTANT

## 2021-09-28 ASSESSMENT — PAIN DESCRIPTION - FREQUENCY: FREQUENCY: CONTINUOUS

## 2021-09-29 ENCOUNTER — APPOINTMENT (OUTPATIENT)
Dept: MRI IMAGING | Age: 21
DRG: 419 | End: 2021-09-29
Payer: COMMERCIAL

## 2021-09-29 ENCOUNTER — APPOINTMENT (OUTPATIENT)
Dept: ULTRASOUND IMAGING | Age: 21
DRG: 419 | End: 2021-09-29
Payer: COMMERCIAL

## 2021-09-29 PROBLEM — K81.9 CHOLECYSTITIS: Status: ACTIVE | Noted: 2021-09-29

## 2021-09-29 LAB
ABSOLUTE EOS #: 0.21 K/UL (ref 0–0.44)
ABSOLUTE IMMATURE GRANULOCYTE: <0.03 K/UL (ref 0–0.3)
ABSOLUTE LYMPH #: 1.24 K/UL (ref 1.1–3.7)
ABSOLUTE MONO #: 0.52 K/UL (ref 0.1–1.4)
ALBUMIN SERPL-MCNC: 4.3 G/DL (ref 3.5–5.2)
ALBUMIN/GLOBULIN RATIO: 2.3 (ref 1–2.5)
ALP BLD-CCNC: 160 U/L (ref 35–104)
ALT SERPL-CCNC: 627 U/L (ref 5–33)
ANION GAP SERPL CALCULATED.3IONS-SCNC: 12 MMOL/L (ref 9–17)
AST SERPL-CCNC: 393 U/L
BASOPHILS # BLD: 1 % (ref 0–2)
BASOPHILS ABSOLUTE: 0.04 K/UL (ref 0–0.2)
BILIRUB SERPL-MCNC: 3.73 MG/DL (ref 0.3–1.2)
BILIRUBIN DIRECT: 3.14 MG/DL
BILIRUBIN, INDIRECT: 0.59 MG/DL (ref 0–1)
BUN BLDV-MCNC: 8 MG/DL (ref 6–20)
BUN/CREAT BLD: ABNORMAL (ref 9–20)
CALCIUM SERPL-MCNC: 9.2 MG/DL (ref 8.6–10.4)
CHLORIDE BLD-SCNC: 106 MMOL/L (ref 98–107)
CO2: 22 MMOL/L (ref 20–31)
CREAT SERPL-MCNC: 0.57 MG/DL (ref 0.5–0.9)
DIFFERENTIAL TYPE: ABNORMAL
EOSINOPHILS RELATIVE PERCENT: 4 % (ref 1–4)
GFR AFRICAN AMERICAN: >60 ML/MIN
GFR NON-AFRICAN AMERICAN: >60 ML/MIN
GFR SERPL CREATININE-BSD FRML MDRD: ABNORMAL ML/MIN/{1.73_M2}
GFR SERPL CREATININE-BSD FRML MDRD: ABNORMAL ML/MIN/{1.73_M2}
GLOBULIN: ABNORMAL G/DL (ref 1.5–3.8)
GLUCOSE BLD-MCNC: 102 MG/DL (ref 70–99)
HCG QUALITATIVE: NEGATIVE
HCT VFR BLD CALC: 40.7 % (ref 36.3–47.1)
HEMOGLOBIN: 13.4 G/DL (ref 11.9–15.1)
IMMATURE GRANULOCYTES: 0 %
LIPASE: 21 U/L (ref 13–60)
LYMPHOCYTES # BLD: 24 % (ref 25–45)
MCH RBC QN AUTO: 28.3 PG (ref 25.2–33.5)
MCHC RBC AUTO-ENTMCNC: 32.9 G/DL (ref 28.4–34.8)
MCV RBC AUTO: 85.9 FL (ref 82.6–102.9)
MONOCYTES # BLD: 10 % (ref 2–8)
NRBC AUTOMATED: 0 PER 100 WBC
PDW BLD-RTO: 12.4 % (ref 11.8–14.4)
PLATELET # BLD: 213 K/UL (ref 138–453)
PLATELET ESTIMATE: ABNORMAL
PMV BLD AUTO: 12 FL (ref 8.1–13.5)
POTASSIUM SERPL-SCNC: 3.9 MMOL/L (ref 3.7–5.3)
RBC # BLD: 4.74 M/UL (ref 3.95–5.11)
RBC # BLD: ABNORMAL 10*6/UL
SARS-COV-2, RAPID: NOT DETECTED
SEG NEUTROPHILS: 61 % (ref 34–64)
SEGMENTED NEUTROPHILS ABSOLUTE COUNT: 3.2 K/UL (ref 1.5–8.1)
SODIUM BLD-SCNC: 140 MMOL/L (ref 135–144)
SPECIMEN DESCRIPTION: NORMAL
TOTAL PROTEIN: 6.2 G/DL (ref 6.4–8.3)
WBC # BLD: 5.2 K/UL (ref 4.5–13.5)
WBC # BLD: ABNORMAL 10*3/UL

## 2021-09-29 PROCEDURE — 96376 TX/PRO/DX INJ SAME DRUG ADON: CPT

## 2021-09-29 PROCEDURE — G0378 HOSPITAL OBSERVATION PER HR: HCPCS

## 2021-09-29 PROCEDURE — 80048 BASIC METABOLIC PNL TOTAL CA: CPT

## 2021-09-29 PROCEDURE — 85025 COMPLETE CBC W/AUTO DIFF WBC: CPT

## 2021-09-29 PROCEDURE — 6360000002 HC RX W HCPCS: Performed by: STUDENT IN AN ORGANIZED HEALTH CARE EDUCATION/TRAINING PROGRAM

## 2021-09-29 PROCEDURE — 1200000000 HC SEMI PRIVATE

## 2021-09-29 PROCEDURE — 83690 ASSAY OF LIPASE: CPT

## 2021-09-29 PROCEDURE — 6370000000 HC RX 637 (ALT 250 FOR IP): Performed by: STUDENT IN AN ORGANIZED HEALTH CARE EDUCATION/TRAINING PROGRAM

## 2021-09-29 PROCEDURE — 80076 HEPATIC FUNCTION PANEL: CPT

## 2021-09-29 PROCEDURE — 2500000003 HC RX 250 WO HCPCS: Performed by: STUDENT IN AN ORGANIZED HEALTH CARE EDUCATION/TRAINING PROGRAM

## 2021-09-29 PROCEDURE — 74181 MRI ABDOMEN W/O CONTRAST: CPT

## 2021-09-29 PROCEDURE — 76705 ECHO EXAM OF ABDOMEN: CPT

## 2021-09-29 PROCEDURE — 87635 SARS-COV-2 COVID-19 AMP PRB: CPT

## 2021-09-29 RX ORDER — SODIUM CHLORIDE 0.9 % (FLUSH) 0.9 %
5-40 SYRINGE (ML) INJECTION EVERY 12 HOURS SCHEDULED
Status: DISCONTINUED | OUTPATIENT
Start: 2021-09-29 | End: 2021-10-01 | Stop reason: HOSPADM

## 2021-09-29 RX ORDER — CIPROFLOXACIN 2 MG/ML
400 INJECTION, SOLUTION INTRAVENOUS EVERY 12 HOURS
Status: DISCONTINUED | OUTPATIENT
Start: 2021-09-29 | End: 2021-10-01 | Stop reason: HOSPADM

## 2021-09-29 RX ORDER — ONDANSETRON 4 MG/1
4 TABLET, ORALLY DISINTEGRATING ORAL EVERY 8 HOURS PRN
Status: DISCONTINUED | OUTPATIENT
Start: 2021-09-29 | End: 2021-10-01 | Stop reason: HOSPADM

## 2021-09-29 RX ORDER — SODIUM CHLORIDE 0.9 % (FLUSH) 0.9 %
5-40 SYRINGE (ML) INJECTION PRN
Status: DISCONTINUED | OUTPATIENT
Start: 2021-09-29 | End: 2021-10-01 | Stop reason: HOSPADM

## 2021-09-29 RX ORDER — SODIUM CHLORIDE, SODIUM LACTATE, POTASSIUM CHLORIDE, CALCIUM CHLORIDE 600; 310; 30; 20 MG/100ML; MG/100ML; MG/100ML; MG/100ML
INJECTION, SOLUTION INTRAVENOUS CONTINUOUS
Status: DISCONTINUED | OUTPATIENT
Start: 2021-09-29 | End: 2021-09-30

## 2021-09-29 RX ORDER — SODIUM CHLORIDE 9 MG/ML
25 INJECTION, SOLUTION INTRAVENOUS PRN
Status: DISCONTINUED | OUTPATIENT
Start: 2021-09-29 | End: 2021-10-01 | Stop reason: HOSPADM

## 2021-09-29 RX ORDER — OXYCODONE HYDROCHLORIDE 5 MG/1
5 TABLET ORAL EVERY 4 HOURS PRN
Status: DISCONTINUED | OUTPATIENT
Start: 2021-09-29 | End: 2021-09-30

## 2021-09-29 RX ORDER — FENTANYL CITRATE 50 UG/ML
50 INJECTION, SOLUTION INTRAMUSCULAR; INTRAVENOUS
Status: DISCONTINUED | OUTPATIENT
Start: 2021-09-29 | End: 2021-09-29

## 2021-09-29 RX ORDER — ONDANSETRON 2 MG/ML
4 INJECTION INTRAMUSCULAR; INTRAVENOUS EVERY 6 HOURS PRN
Status: DISCONTINUED | OUTPATIENT
Start: 2021-09-29 | End: 2021-10-01 | Stop reason: HOSPADM

## 2021-09-29 RX ADMIN — CIPROFLOXACIN 400 MG: 2 INJECTION, SOLUTION INTRAVENOUS at 05:28

## 2021-09-29 RX ADMIN — ONDANSETRON 4 MG: 2 INJECTION INTRAMUSCULAR; INTRAVENOUS at 05:29

## 2021-09-29 RX ADMIN — CIPROFLOXACIN 400 MG: 2 INJECTION, SOLUTION INTRAVENOUS at 18:26

## 2021-09-29 RX ADMIN — METRONIDAZOLE 500 MG: 500 INJECTION, SOLUTION INTRAVENOUS at 14:47

## 2021-09-29 RX ADMIN — ONDANSETRON 4 MG: 2 INJECTION INTRAMUSCULAR; INTRAVENOUS at 12:13

## 2021-09-29 RX ADMIN — OXYCODONE 5 MG: 5 TABLET ORAL at 05:28

## 2021-09-29 RX ADMIN — METRONIDAZOLE 500 MG: 500 INJECTION, SOLUTION INTRAVENOUS at 20:19

## 2021-09-29 ASSESSMENT — PAIN SCALES - GENERAL: PAINLEVEL_OUTOF10: 8

## 2021-09-29 NOTE — ED PROVIDER NOTES
[facility]   Emergency Department  Emergency Medicine Attending Sign-out     Care of Misa Felix was assumed from previous attending Dr. Jerrald Kawasaki and is being seen for Abdominal Pain (gallbladder pain)  . The patient's initial evaluation and plan have been discussed with the prior provider who initially evaluated the patient. Attestation  I was available and discussed any additional care issues that arose and coordinated the management plans with the resident(s) caring for the patient during my duty period. Any areas of disagreement with resident's documentation of care or procedures are noted on the chart. I was personally present for the key portions of any/all procedures, during my duty period. I have documented in the chart those procedures where I was not present during the key portions. BRIEF PATIENT SUMMARY/MDM COURSE PER INITIAL PROVIDER:   RECENT VITALS:     Temp: 97.8 °F (36.6 °C),  Pulse: 81, Resp: 18, BP: 125/80, SpO2: 95 %    This patient is a 24 y.o. Female with right upper quadrant pain. Has a history of cholecystitis on prior CT scan but has been having improving pain and therefore been referred to outpatient surgery. Now having worsening pain. Bedside ultrasound shows stone in the gallbladder. Awaiting labs surgery consult.     DIAGNOSTICS/MEDICATIONS:     MEDICATIONS GIVEN:  ED Medication Orders (From admission, onward)    Start Ordered     Status Ordering Provider    09/28/21 2300 09/28/21 2247  fentaNYL (SUBLIMAZE) injection 100 mcg  ONCE      Last MAR action: Given - by PAMELA Santana on 09/28/21 at [facility]    09/28/21 2300 09/28/21 2250  ondansetron (ZOFRAN) injection 4 mg  ONCE      Last MAR action: Given - by PAMELA MAGANA on 09/28/21 at [address] [facility] [facility]          LABS    Labs Reviewed   COMPREHENSIVE METABOLIC PANEL W/ REFLEX TO MG FOR LOW K - Abnormal; Notable for the following components:       Result Value    Glucose 118 (*)     Alkaline Phosphatase 166 (*)      (*)      (*)     Total Bilirubin 3.71 (*)     All other components within normal limits   CBC WITH AUTO DIFFERENTIAL - Abnormal; Notable for the following components:    Seg Neutrophils 86 (*)     Lymphocytes 8 (*)     Eosinophils % 0 (*)     Absolute Lymph # 0.52 (*)     All other components within normal limits   LIPASE   HCG, SERUM, QUALITATIVE       RADIOLOGY  No results found. OUTSTANDING TASKS / ADDITIONAL COMMENTS   1. Labs  2.  Surgery consult    Val Hampton MD  Emergency Medicine Attending  [facility]        Rosanne Champion MD  09/28/21 2517

## 2021-09-29 NOTE — ED NOTES
Bed: 21  Expected date:   Expected time:   Means of arrival:   Comments:     [address] RN  09/28/21 5633

## 2021-09-29 NOTE — H&P
Consult - General Surgery    PATIENT NAME: Sylvia Briceño  AGE: 24 y.o. MEDICAL RECORD NO. [id number]  DATE: 9/29/2021  SURGEON: RONN   PRIMARY CARE PHYSICIAN: Braulio Rodriguez, GORDON - CNP    Patient evaluated at the request of  Dr. Carri Conner   Reason for evaluation: Cholelithiasis, cholecystitis    Patient information was obtained from patient. History/Exam limitations: none. Patient presented to the Emergency Department by private vehicle. IMPRESSION:   24 y.o. female with cholelithiasis and cholecystitis    MEDICAL DECISION MAKING AND PLAN:       Discussed the patient, history, physical, labs, imaging with the on-call attending. Clinical picture consistent with cholecystitis. Patient with CT evidence of cholecystitis with pericholecystic fluid, wall thickening, and with cholelithiasis visualized. LFTs now elevated when compared to normal labs on prior visit on 9/16: Bilirubin 3.71 from 0.32, AST//720 from 13/23  Without leukocytosis. Afebrile, hemodynamically stable  Will need OR for cholecystectomy during this visit  Ordered RUQ US. Based on these findings, may need ERCP and MRCP with GI  N.p.o., IVF  ABx: Cipro, Flagyl  Pain: MMT  Admit to [facility]      HISTORY:   History of Chief Complaint:    Sylvia Briceño is a 24 y.o. female who is 4 months postpartum who presents with 2 days of right upper quadrant crampy abdominal pain. Patient was also recently in the ED on 9/16 for similar symptoms at which time she got a CT abdomen/pelvis which showed a distended gallbladder with pericholecystic fluid, gallbladder wall thickening and cholelithiasis but with normal LFTs. Patient was sent home to follow-up as an outpatient for elective cholecystectomy. Patient reports the pain came back 2 days ago and has been constant ever since. Patient does report that the pain is worse with eating high-fat diet. Admits to nausea without emesis. Denies CP, S OB, F/C, dysuria.  Continues to have regular bowel movements every other day. Has not noted changes to the color of her stool or urine. Patient denies smoking tobacco, does report she drinks rarely, and denies illicit or recreational drugs including marijuana. Does not take any blood thinners including aspirin. Has never had any abdominal surgeries. Last meal was at 6 PM on 9/28. Past Medical History   has a past medical history of No pertinent past medical history. Past Surgical History   has a past surgical history that includes [place] tooth extraction. Medications  Prior to Admission medications    Not on File    Scheduled Meds:  Continuous Infusions:  PRN Meds:. Allergies  has No Known Allergies. Family History  family history includes Diabetes in her maternal grandmother. Social History   reports that she has never smoked. She has never used smokeless tobacco.   reports previous alcohol use. reports no history of drug use. Review of Systems  General Denies any fever or chills  HEENT Denies any diplopia, tinnitus or vertigo  Resp Denies any shortness of breath, cough or wheezing  Cardiac Denies any chest pain, palpitations, claudication or edema  GI Denies any melena, hematochezia, hematemesis or pyrosis   Denies any frequency, urgency, hesitancy or incontinence  Heme Denies bruising or bleeding easily  Endocrine Denies any history of diabetes or thyroid disease  Neuro Denies any focal motor or sensory deficits    PHYSICAL:   VITALS:  height is 5' 6\" (1.676 m) and weight is 220 lb (99.8 kg). Her infrared temperature is 97.8 °F (36.6 °C). Her blood pressure is 125/80 and her pulse is 81. Her respiration is 18 and oxygen saturation is 95%. CONSTITUTIONAL: Alert and oriented times 3, no acute distress and cooperative to examination. HEENT: Head is normocephalic, atraumatic.  EOMI, PERRLA  NECK: Soft, trachea midline and straight  LUNGS: Unlabored breathing on RA  CARDIOVASCULAR: RRR  ABDOMEN: Obese, soft, nondistended, moderate TTP RUQ and epigastric region with positive Lara sign. Without rigidity/rebound/peritoneal signs  NEUROLOGIC: CN II-XII are grossly intact.  There are no focalizing motor or sensory deficits  EXTREMITIES: no cyanosis, clubbing or edema    LABS:     Recent Labs     09/28/21 2255   WBC 6.5   HGB 13.9   HCT 41.2         K 3.9      CO2 21   BUN 9   CREATININE 0.60   CALCIUM 9.5   *   *   BILITOT 3.71*     Recent Labs     09/28/21  2255   ALKPHOS 166*   *   *   BILITOT 3.71*   LABALBU 4.8   LIPASE 29     CBC with Differential:    Lab Results   Component Value Date    WBC 6.5 09/28/2021    RBC 4.85 09/28/2021    HGB 13.9 09/28/2021    HCT 41.2 09/28/2021     09/28/2021    MCV 84.9 09/28/2021    MCH 28.7 09/28/2021    MCHC 33.7 09/28/2021    RDW 12.4 09/28/2021    LYMPHOPCT 8 09/28/2021    MONOPCT 5 09/28/2021    BASOPCT 1 09/28/2021    MONOSABS 0.33 09/28/2021    LYMPHSABS 0.52 09/28/2021    EOSABS 0.00 09/28/2021    BASOSABS 0.07 09/28/2021    DIFFTYPE NOT REPORTED 09/28/2021     CMP:    Lab Results   Component Value Date     09/28/2021    K 3.9 09/28/2021     09/28/2021    CO2 21 09/28/2021    BUN 9 09/28/2021    CREATININE 0.60 09/28/2021    GFRAA >60 09/28/2021    LABGLOM >60 09/28/2021    GLUCOSE 118 09/28/2021    PROT 7.1 09/28/2021    LABALBU 4.8 09/28/2021    CALCIUM 9.5 09/28/2021    BILITOT 3.71 09/28/2021    ALKPHOS 166 09/28/2021     09/28/2021     09/28/2021     BMP:    Lab Results   Component Value Date     09/28/2021    K 3.9 09/28/2021     09/28/2021    CO2 21 09/28/2021    BUN 9 09/28/2021    LABALBU 4.8 09/28/2021    CREATININE 0.60 09/28/2021    CALCIUM 9.5 09/28/2021    GFRAA >60 09/28/2021    LABGLOM >60 09/28/2021    GLUCOSE 118 09/28/2021     Hepatic Function Panel:    Lab Results   Component Value Date    ALKPHOS 166 09/28/2021     09/28/2021     09/28/2021    PROT 7.1 09/28/2021    BILITOT 3.71 09/28/2021 LABALBU 4.8 09/28/2021     AMYLASE:  No results found for: AMYLASE  LIPASE:    Lab Results   Component Value Date    LIPASE 29 09/28/2021     Urine Culture:  No components found for: CURINE  Blood Culture:  No components found for: CBLOOD, CFUNGUSBL    RADIOLOGY:   CT abdomen/pelvis with IV contrast 9/16/2021     Impression   Findings consistent with acute cholecystitis. If further evaluation is   necessary, ultrasound could be performed. Unchanged incidental right labia majora lipoma. Maya Duarte DO  9/29/21, 1:59 AM        Attending Note    Patient seen as a general surgery consult in ED 21 for recurrent biliary colic, possible cholecystitis. This admission demonstrates, which previous evaluation did not, an elevated alt and total bili. Will order U/S and possible MRCP  I have reviewed the above TECSS note(s) and I either performed the key elements of the medical history and physical exam or was present with the resident when the key elements of the medical history and physical exam were performed. I have discussed the findings, established the care plan and recommendations with Resident Jean-Claude Villa.     Kaitlynn Anaya MD  9/29/2021  10:34 AM

## 2021-09-29 NOTE — ED PROVIDER NOTES
Smokeless tobacco: Never Used   Vaping Use    Vaping Use: Never used   Substance and Sexual Activity    Alcohol use: Not Currently    Drug use: Never    Sexual activity: Yes     Partners: Male   Other Topics Concern    Not on file   Social History Narrative    Not on file     Social Determinants of Health     Financial Resource Strain: Low Risk     Difficulty of Paying Living Expenses: Not hard at all   Food Insecurity: No Food Insecurity    Worried About Running Out of Food in the Last Year: Never true    [address] in the Last Year: Never true   Transportation Needs:     Lack of Transportation (Medical):  Lack of Transportation (Non-Medical):    Physical Activity:     Days of Exercise per Week:     Minutes of Exercise per Session:    Stress:     Feeling of Stress :    Social Connections:     Frequency of Communication with Friends and Family:     Frequency of Social Gatherings with Friends and Family:     Attends Sikhism Services:     Active Member of Clubs or Organizations:     Attends Club or Organization Meetings:     Marital Status:    Intimate Partner Violence:     Fear of Current or Ex-Partner:     Emotionally Abused:     Physically Abused:     Sexually Abused:        Family History   Problem Relation Age of Onset    Diabetes Maternal Grandmother         Allergies:  Patient has no known allergies. Home Medications:  Prior to Admission medications    Not on File       REVIEW OFSYSTEMS    (2-9 systems for level 4, 10 or more for level 5)      Review of Systems   Constitutional: Negative for chills and fever. HENT: Negative for trouble swallowing. Respiratory: Negative for shortness of breath. Cardiovascular: Negative for chest pain. Gastrointestinal: Positive for abdominal pain. Negative for nausea and vomiting. Genitourinary: Negative for dysuria, flank pain and vaginal bleeding. Musculoskeletal: Negative for back pain. Skin: Negative for pallor. Allergic/Immunologic: Negative for immunocompromised state. Neurological: Negative for light-headedness. Hematological: Does not bruise/bleed easily. PHYSICAL EXAM   (up to 7 for level 4, 8 or more forlevel 5)      INITIAL VITALS:   ED Triage Vitals [09/28/21 2113]   BP Temp Temp Source Pulse Resp SpO2 Height Weight   125/80 97.8 °F (36.6 °C) Infrared 81 18 95 % 5' 6\" (1.676 m) 220 lb (99.8 kg)       Physical Exam  Vitals reviewed. Constitutional:       General: She is not in acute distress. Appearance: She is obese. She is not ill-appearing. HENT:      Head: Normocephalic and atraumatic. Mouth/Throat:      Mouth: Mucous membranes are moist.      Pharynx: Oropharynx is clear. Eyes:      General: No scleral icterus. Extraocular Movements: Extraocular movements intact. Cardiovascular:      Rate and Rhythm: Normal rate and regular rhythm. Heart sounds: Normal heart sounds. No murmur heard. No friction rub. No gallop. Pulmonary:      Effort: Pulmonary effort is normal.      Breath sounds: Normal breath sounds. Abdominal:      General: There is no distension. Palpations: Abdomen is soft. Tenderness: There is abdominal tenderness in the right upper quadrant. Positive signs include Lara's sign. Skin:     General: Skin is warm and dry. Capillary Refill: Capillary refill takes less than 2 seconds. Neurological:      General: No focal deficit present. Mental Status: She is oriented to person, place, and time.    Psychiatric:         Mood and Affect: Mood normal.         Behavior: Behavior normal.         DIFFERENTIAL  DIAGNOSIS     PLAN (LABS / IMAGING / EKG):  Orders Placed This Encounter   Procedures    COVID-19, Rapid    US GALLBLADDER RUQ    Lipase    Comprehensive Metabolic Panel w/ Reflex to MG    CBC Auto Differential    HCG Qualitative, Serum    Basic Metabolic Panel w/ Reflex to MG    Hepatic function panel    Lipase    CBC auto differential    Diet NPO Exceptions are: Sips of Water with Meds    Vital signs per unit routine    Up as tolerated    Daily weights    Intake and output    Full Code    Initiate Oxygen Therapy Protocol    PATIENT STATUS (FROM ED OR OR/PROCEDURAL) Inpatient       MEDICATIONS ORDERED:  Orders Placed This Encounter   Medications    fentaNYL (SUBLIMAZE) injection 100 mcg    ondansetron (ZOFRAN) injection 4 mg    sodium chloride flush 0.9 % injection 5-40 mL    sodium chloride flush 0.9 % injection 5-40 mL    0.9 % sodium chloride infusion    OR Linked Order Group     ondansetron (ZOFRAN-ODT) disintegrating tablet 4 mg     ondansetron (ZOFRAN) injection 4 mg    lactated ringers infusion    ciprofloxacin (CIPRO) IVPB 400 mg     Order Specific Question:   Antimicrobial Indications     Answer:   Intra-Abdominal Infection    metronidazole (FLAGYL) 500 mg in NaCl 100 mL IVPB premix     Order Specific Question:   Antimicrobial Indications     Answer:   Intra-Abdominal Infection    oxyCODONE (ROXICODONE) immediate release tablet 5 mg    fentaNYL (SUBLIMAZE) injection 50 mcg       DDX: Acute cholecystitis, biliary colic, choledocholithiasis, gallstone pancreatitis    Initial MDM/Plan: 24 y.o. female who presents with recurrent and worsening right upper quadrant pain. Highly suspect acute cholecystitis, but may also be choledocho lithiasis, or gallstone pancreatitis. I will obtain belly labs, CBC and performed point of care ultrasound at bedside. Likely surgical consultation and admission.     DIAGNOSTIC RESULTS / EMERGENCYDEPARTMENT COURSE / MDM     LABS:  Labs Reviewed   COMPREHENSIVE METABOLIC PANEL W/ REFLEX TO MG FOR LOW K - Abnormal; Notable for the following components:       Result Value    Glucose 118 (*)     Alkaline Phosphatase 166 (*)      (*)      (*)     Total Bilirubin 3.71 (*)     All other components within normal limits   CBC WITH AUTO DIFFERENTIAL - Abnormal; Notable for the following components:    Seg Neutrophils 86 (*)     Lymphocytes 8 (*)     Eosinophils % 0 (*)     Absolute Lymph # 0.52 (*)     All other components within normal limits   COVID-19, RAPID   LIPASE   HCG, SERUM, QUALITATIVE   BASIC METABOLIC PANEL W/ REFLEX TO MG FOR LOW K   HEPATIC FUNCTION PANEL   LIPASE   CBC WITH AUTO DIFFERENTIAL         RADIOLOGY:  No results found. EMERGENCY DEPARTMENT COURSE:  ED Course as of Sep 29 0516   Wed Sep 29, 2021   0004 Bedside ultrasound performed. Distended gallbladder with large obstructing stone at the gallbladder neck. No wall thickening or pericholecystic fluid. Positive sonographic Lara sign. Laboratory work-up significant for elevated LFTs and bilirubin 3.71. Will consult general surgery for patient admission. [GG]   0230 Patient seen and evaluated by surgery at bedside. They will admit the patient for further imaging and likely laparoscopic cholecystectomy tomorrow morning. [GG]      ED Course User Index  [GG] Diane Moise MD         PROCEDURES:  None    CONSULTS:  None    CRITICAL CARE  Please see attending note    FINAL IMPRESSION      1. Acute cholecystitis          DISPOSITION / PLAN     DISPOSITION Admitted 09/29/2021 02:30:53 AM      PATIENT REFERRED TO:  No follow-up provider specified.     DISCHARGE MEDICATIONS:  New Prescriptions    No medications on file       Diane Moise MD  Emergency Medicine Resident    (Please note that portions of this note were completed with a voice recognition program.Efforts were made to edit the dictations but occasionally words are mis-transcribed.)       Diane Moise MD  Resident  09/29/21 1001

## 2021-09-29 NOTE — ED PROVIDER NOTES
[facility] ED     Emergency Department     Faculty Attestation    I performed a history and physical examination of the patient and discussed management with the resident. I reviewed the residents note and agree with the documented findings and plan of care. Any areas of disagreement are noted on the chart. I was personally present for the key portions of any procedures. I have documented in the chart those procedures where I was not present during the key portions. I have reviewed the emergency nurses triage note. I agree with the chief complaint, past medical history, past surgical history, allergies, medications, social and family history as documented unless otherwise noted below. For Physician Assistant/ Nurse Practitioner cases/documentation I have personally evaluated this patient and have completed at least one if not all key elements of the E/M (history, physical exam, and MDM). Additional findings are as noted. Presents with right upper quadrant abdominal pain that she has had for the past couple of weeks. Patient was seen here and had a CT scan on the 16th that showed acute cholecystitis. Patient was feeling better after the scan and was discharged home with outpatient follow-up to surgery. Patient says she was feeling well up until 2 days ago when the pain returned. She says she has been nauseous as well. She denies fever, chest pain, shortness of breath. Patient is 4 months postpartum. She denies any previous abdominal surgeries. She is currently breast-feeding. On exam, patient is resting comfortably in the bed. Lungs clear to auscultation bilaterally heart sounds are normal.  Abdomen is soft with moderate right upper quadrant tenderness. No rebound or guarding is present. Will check labs and plan to speak with general surgery.       Sami Martinez MD  Attending Emergency  Physician              Ross Felipe MD  09/28/21 8285

## 2021-09-29 NOTE — ED NOTES
Report given to RN taking care of pt, no further questions with understanding of pt.      De Mcardle, RN  09/29/21 3175

## 2021-09-29 NOTE — ED NOTES
Pt resting in bed, NAD noted rr even and non labored. Bed locked in lowest position, environment free of clutter. Call light within reach, will continue to monitor.        Marleny Ross RN  09/29/21 5762

## 2021-09-30 ENCOUNTER — ANESTHESIA (OUTPATIENT)
Dept: OPERATING ROOM | Age: 21
DRG: 419 | End: 2021-09-30
Payer: COMMERCIAL

## 2021-09-30 ENCOUNTER — ANESTHESIA EVENT (OUTPATIENT)
Dept: OPERATING ROOM | Age: 21
DRG: 419 | End: 2021-09-30
Payer: COMMERCIAL

## 2021-09-30 VITALS — TEMPERATURE: 96.1 F | SYSTOLIC BLOOD PRESSURE: 153 MMHG | OXYGEN SATURATION: 100 % | DIASTOLIC BLOOD PRESSURE: 97 MMHG

## 2021-09-30 LAB
ABSOLUTE EOS #: 0.17 K/UL (ref 0–0.44)
ABSOLUTE IMMATURE GRANULOCYTE: <0.03 K/UL (ref 0–0.3)
ABSOLUTE LYMPH #: 1.14 K/UL (ref 1.1–3.7)
ABSOLUTE MONO #: 0.5 K/UL (ref 0.1–1.4)
ALBUMIN SERPL-MCNC: 3.8 G/DL (ref 3.5–5.2)
ALBUMIN/GLOBULIN RATIO: 2 (ref 1–2.5)
ALP BLD-CCNC: 142 U/L (ref 35–104)
ALT SERPL-CCNC: 411 U/L (ref 5–33)
ANION GAP SERPL CALCULATED.3IONS-SCNC: 13 MMOL/L (ref 9–17)
AST SERPL-CCNC: 138 U/L
BASOPHILS # BLD: 0 % (ref 0–2)
BASOPHILS ABSOLUTE: <0.03 K/UL (ref 0–0.2)
BILIRUB SERPL-MCNC: 3.18 MG/DL (ref 0.3–1.2)
BILIRUBIN DIRECT: 2.9 MG/DL
BILIRUBIN, INDIRECT: 0.28 MG/DL (ref 0–1)
BUN BLDV-MCNC: 5 MG/DL (ref 6–20)
CALCIUM SERPL-MCNC: 9.1 MG/DL (ref 8.6–10.4)
CHLORIDE BLD-SCNC: 108 MMOL/L (ref 98–107)
CO2: 18 MMOL/L (ref 20–31)
CREAT SERPL-MCNC: 0.61 MG/DL (ref 0.5–0.9)
DIFFERENTIAL TYPE: ABNORMAL
EOSINOPHILS RELATIVE PERCENT: 4 % (ref 1–4)
GFR AFRICAN AMERICAN: >60 ML/MIN
GFR NON-AFRICAN AMERICAN: >60 ML/MIN
GFR SERPL CREATININE-BSD FRML MDRD: ABNORMAL ML/MIN/{1.73_M2}
GFR SERPL CREATININE-BSD FRML MDRD: ABNORMAL ML/MIN/{1.73_M2}
GLUCOSE BLD-MCNC: 120 MG/DL (ref 70–99)
HCT VFR BLD CALC: 38.9 % (ref 36.3–47.1)
HEMOGLOBIN: 12.6 G/DL (ref 11.9–15.1)
IMMATURE GRANULOCYTES: 1 %
LYMPHOCYTES # BLD: 26 % (ref 25–45)
MCH RBC QN AUTO: 28.8 PG (ref 25.2–33.5)
MCHC RBC AUTO-ENTMCNC: 32.4 G/DL (ref 28.4–34.8)
MCV RBC AUTO: 88.8 FL (ref 82.6–102.9)
MONOCYTES # BLD: 11 % (ref 2–8)
NRBC AUTOMATED: 0 PER 100 WBC
PDW BLD-RTO: 12.7 % (ref 11.8–14.4)
PLATELET # BLD: 189 K/UL (ref 138–453)
PLATELET ESTIMATE: ABNORMAL
PMV BLD AUTO: 11.9 FL (ref 8.1–13.5)
POTASSIUM SERPL-SCNC: 3.6 MMOL/L (ref 3.7–5.3)
RBC # BLD: 4.38 M/UL (ref 3.95–5.11)
RBC # BLD: ABNORMAL 10*6/UL
SEG NEUTROPHILS: 58 % (ref 34–64)
SEGMENTED NEUTROPHILS ABSOLUTE COUNT: 2.55 K/UL (ref 1.5–8.1)
SODIUM BLD-SCNC: 139 MMOL/L (ref 135–144)
TOTAL PROTEIN: 5.7 G/DL (ref 6.4–8.3)
WBC # BLD: 4.4 K/UL (ref 4.5–13.5)
WBC # BLD: ABNORMAL 10*3/UL

## 2021-09-30 PROCEDURE — 7100000000 HC PACU RECOVERY - FIRST 15 MIN: Performed by: SURGERY

## 2021-09-30 PROCEDURE — 6360000002 HC RX W HCPCS: Performed by: ANESTHESIOLOGY

## 2021-09-30 PROCEDURE — 0FT44ZZ RESECTION OF GALLBLADDER, PERCUTANEOUS ENDOSCOPIC APPROACH: ICD-10-PCS | Performed by: SURGERY

## 2021-09-30 PROCEDURE — 2580000003 HC RX 258

## 2021-09-30 PROCEDURE — 85025 COMPLETE CBC W/AUTO DIFF WBC: CPT

## 2021-09-30 PROCEDURE — 2709999900 HC NON-CHARGEABLE SUPPLY: Performed by: SURGERY

## 2021-09-30 PROCEDURE — 7100000001 HC PACU RECOVERY - ADDTL 15 MIN: Performed by: SURGERY

## 2021-09-30 PROCEDURE — 3700000001 HC ADD 15 MINUTES (ANESTHESIA): Performed by: SURGERY

## 2021-09-30 PROCEDURE — 6360000002 HC RX W HCPCS: Performed by: STUDENT IN AN ORGANIZED HEALTH CARE EDUCATION/TRAINING PROGRAM

## 2021-09-30 PROCEDURE — 80053 COMPREHEN METABOLIC PANEL: CPT

## 2021-09-30 PROCEDURE — 6360000002 HC RX W HCPCS: Performed by: NURSE ANESTHETIST, CERTIFIED REGISTERED

## 2021-09-30 PROCEDURE — 2580000003 HC RX 258: Performed by: EMERGENCY MEDICINE

## 2021-09-30 PROCEDURE — 1200000000 HC SEMI PRIVATE

## 2021-09-30 PROCEDURE — 3600000004 HC SURGERY LEVEL 4 BASE: Performed by: SURGERY

## 2021-09-30 PROCEDURE — G0378 HOSPITAL OBSERVATION PER HR: HCPCS

## 2021-09-30 PROCEDURE — 82248 BILIRUBIN DIRECT: CPT

## 2021-09-30 PROCEDURE — 3700000000 HC ANESTHESIA ATTENDED CARE: Performed by: SURGERY

## 2021-09-30 PROCEDURE — 2500000003 HC RX 250 WO HCPCS: Performed by: ANESTHESIOLOGY

## 2021-09-30 PROCEDURE — 2580000003 HC RX 258: Performed by: STUDENT IN AN ORGANIZED HEALTH CARE EDUCATION/TRAINING PROGRAM

## 2021-09-30 PROCEDURE — 3600000014 HC SURGERY LEVEL 4 ADDTL 15MIN: Performed by: SURGERY

## 2021-09-30 PROCEDURE — 6370000000 HC RX 637 (ALT 250 FOR IP): Performed by: EMERGENCY MEDICINE

## 2021-09-30 PROCEDURE — 36415 COLL VENOUS BLD VENIPUNCTURE: CPT

## 2021-09-30 PROCEDURE — 2500000003 HC RX 250 WO HCPCS: Performed by: STUDENT IN AN ORGANIZED HEALTH CARE EDUCATION/TRAINING PROGRAM

## 2021-09-30 PROCEDURE — 6370000000 HC RX 637 (ALT 250 FOR IP): Performed by: STUDENT IN AN ORGANIZED HEALTH CARE EDUCATION/TRAINING PROGRAM

## 2021-09-30 PROCEDURE — 2580000003 HC RX 258: Performed by: SURGERY

## 2021-09-30 PROCEDURE — 88304 TISSUE EXAM BY PATHOLOGIST: CPT

## 2021-09-30 PROCEDURE — 2500000003 HC RX 250 WO HCPCS: Performed by: SURGERY

## 2021-09-30 RX ORDER — NEOSTIGMINE METHYLSULFATE 5 MG/5 ML
SYRINGE (ML) INTRAVENOUS PRN
Status: DISCONTINUED | OUTPATIENT
Start: 2021-09-30 | End: 2021-09-30 | Stop reason: SDUPTHER

## 2021-09-30 RX ORDER — LIDOCAINE HYDROCHLORIDE 10 MG/ML
INJECTION, SOLUTION EPIDURAL; INFILTRATION; INTRACAUDAL; PERINEURAL PRN
Status: DISCONTINUED | OUTPATIENT
Start: 2021-09-30 | End: 2021-09-30 | Stop reason: SDUPTHER

## 2021-09-30 RX ORDER — SODIUM CHLORIDE, SODIUM LACTATE, POTASSIUM CHLORIDE, CALCIUM CHLORIDE 600; 310; 30; 20 MG/100ML; MG/100ML; MG/100ML; MG/100ML
INJECTION, SOLUTION INTRAVENOUS CONTINUOUS PRN
Status: DISCONTINUED | OUTPATIENT
Start: 2021-09-30 | End: 2021-09-30 | Stop reason: SDUPTHER

## 2021-09-30 RX ORDER — GABAPENTIN 600 MG/1
300 TABLET ORAL 3 TIMES DAILY
Status: DISCONTINUED | OUTPATIENT
Start: 2021-09-30 | End: 2021-10-01 | Stop reason: HOSPADM

## 2021-09-30 RX ORDER — GLYCOPYRROLATE 1 MG/5 ML
SYRINGE (ML) INTRAVENOUS PRN
Status: DISCONTINUED | OUTPATIENT
Start: 2021-09-30 | End: 2021-09-30 | Stop reason: SDUPTHER

## 2021-09-30 RX ORDER — SODIUM CHLORIDE 0.9 % (FLUSH) 0.9 %
5-40 SYRINGE (ML) INJECTION EVERY 12 HOURS SCHEDULED
Status: DISCONTINUED | OUTPATIENT
Start: 2021-09-30 | End: 2021-09-30 | Stop reason: HOSPADM

## 2021-09-30 RX ORDER — OXYCODONE HYDROCHLORIDE AND ACETAMINOPHEN 5; 325 MG/1; MG/1
1 TABLET ORAL PRN
Status: DISCONTINUED | OUTPATIENT
Start: 2021-09-30 | End: 2021-09-30 | Stop reason: HOSPADM

## 2021-09-30 RX ORDER — SODIUM CHLORIDE 9 MG/ML
25 INJECTION, SOLUTION INTRAVENOUS PRN
Status: DISCONTINUED | OUTPATIENT
Start: 2021-09-30 | End: 2021-09-30 | Stop reason: HOSPADM

## 2021-09-30 RX ORDER — METHOCARBAMOL 750 MG/1
750 TABLET, FILM COATED ORAL 4 TIMES DAILY
Status: DISCONTINUED | OUTPATIENT
Start: 2021-09-30 | End: 2021-10-01 | Stop reason: HOSPADM

## 2021-09-30 RX ORDER — SODIUM CHLORIDE, SODIUM LACTATE, POTASSIUM CHLORIDE, CALCIUM CHLORIDE 600; 310; 30; 20 MG/100ML; MG/100ML; MG/100ML; MG/100ML
INJECTION, SOLUTION INTRAVENOUS CONTINUOUS
Status: DISCONTINUED | OUTPATIENT
Start: 2021-09-30 | End: 2021-09-30

## 2021-09-30 RX ORDER — FENTANYL CITRATE 50 UG/ML
25 INJECTION, SOLUTION INTRAMUSCULAR; INTRAVENOUS EVERY 5 MIN PRN
Status: DISCONTINUED | OUTPATIENT
Start: 2021-09-30 | End: 2021-09-30 | Stop reason: HOSPADM

## 2021-09-30 RX ORDER — IBUPROFEN 400 MG/1
400 TABLET ORAL EVERY 4 HOURS
Status: DISCONTINUED | OUTPATIENT
Start: 2021-09-30 | End: 2021-10-01 | Stop reason: HOSPADM

## 2021-09-30 RX ORDER — LIDOCAINE HYDROCHLORIDE 10 MG/ML
1 INJECTION, SOLUTION EPIDURAL; INFILTRATION; INTRACAUDAL; PERINEURAL
Status: DISCONTINUED | OUTPATIENT
Start: 2021-09-30 | End: 2021-09-30 | Stop reason: HOSPADM

## 2021-09-30 RX ORDER — ONDANSETRON 2 MG/ML
4 INJECTION INTRAMUSCULAR; INTRAVENOUS
Status: COMPLETED | OUTPATIENT
Start: 2021-09-30 | End: 2021-09-30

## 2021-09-30 RX ORDER — DEXAMETHASONE SODIUM PHOSPHATE 10 MG/ML
INJECTION INTRAMUSCULAR; INTRAVENOUS PRN
Status: DISCONTINUED | OUTPATIENT
Start: 2021-09-30 | End: 2021-09-30 | Stop reason: SDUPTHER

## 2021-09-30 RX ORDER — ROCURONIUM BROMIDE 10 MG/ML
INJECTION, SOLUTION INTRAVENOUS PRN
Status: DISCONTINUED | OUTPATIENT
Start: 2021-09-30 | End: 2021-09-30 | Stop reason: SDUPTHER

## 2021-09-30 RX ORDER — LABETALOL HYDROCHLORIDE 5 MG/ML
5 INJECTION, SOLUTION INTRAVENOUS EVERY 10 MIN PRN
Status: DISCONTINUED | OUTPATIENT
Start: 2021-09-30 | End: 2021-09-30 | Stop reason: HOSPADM

## 2021-09-30 RX ORDER — CEFAZOLIN SODIUM 1 G/3ML
INJECTION, POWDER, FOR SOLUTION INTRAMUSCULAR; INTRAVENOUS PRN
Status: DISCONTINUED | OUTPATIENT
Start: 2021-09-30 | End: 2021-09-30 | Stop reason: SDUPTHER

## 2021-09-30 RX ORDER — ACETAMINOPHEN 500 MG
1000 TABLET ORAL EVERY 8 HOURS SCHEDULED
Status: DISCONTINUED | OUTPATIENT
Start: 2021-09-30 | End: 2021-10-01 | Stop reason: HOSPADM

## 2021-09-30 RX ORDER — OXYCODONE HYDROCHLORIDE 5 MG/1
5 TABLET ORAL EVERY 6 HOURS PRN
Status: DISCONTINUED | OUTPATIENT
Start: 2021-09-30 | End: 2021-10-01 | Stop reason: HOSPADM

## 2021-09-30 RX ORDER — ONDANSETRON 2 MG/ML
INJECTION INTRAMUSCULAR; INTRAVENOUS PRN
Status: DISCONTINUED | OUTPATIENT
Start: 2021-09-30 | End: 2021-09-30 | Stop reason: SDUPTHER

## 2021-09-30 RX ORDER — OXYCODONE HYDROCHLORIDE AND ACETAMINOPHEN 5; 325 MG/1; MG/1
2 TABLET ORAL PRN
Status: DISCONTINUED | OUTPATIENT
Start: 2021-09-30 | End: 2021-09-30 | Stop reason: HOSPADM

## 2021-09-30 RX ORDER — KETOROLAC TROMETHAMINE 30 MG/ML
INJECTION, SOLUTION INTRAMUSCULAR; INTRAVENOUS PRN
Status: DISCONTINUED | OUTPATIENT
Start: 2021-09-30 | End: 2021-09-30 | Stop reason: SDUPTHER

## 2021-09-30 RX ORDER — DIPHENHYDRAMINE HYDROCHLORIDE 50 MG/ML
12.5 INJECTION INTRAMUSCULAR; INTRAVENOUS
Status: DISCONTINUED | OUTPATIENT
Start: 2021-09-30 | End: 2021-09-30 | Stop reason: HOSPADM

## 2021-09-30 RX ORDER — FENTANYL CITRATE 50 UG/ML
INJECTION, SOLUTION INTRAMUSCULAR; INTRAVENOUS PRN
Status: DISCONTINUED | OUTPATIENT
Start: 2021-09-30 | End: 2021-09-30 | Stop reason: SDUPTHER

## 2021-09-30 RX ORDER — DEXTROSE AND SODIUM CHLORIDE 5; .45 G/100ML; G/100ML
INJECTION, SOLUTION INTRAVENOUS CONTINUOUS
Status: DISCONTINUED | OUTPATIENT
Start: 2021-09-30 | End: 2021-10-01 | Stop reason: HOSPADM

## 2021-09-30 RX ORDER — SODIUM CHLORIDE 0.9 % (FLUSH) 0.9 %
5-40 SYRINGE (ML) INJECTION PRN
Status: DISCONTINUED | OUTPATIENT
Start: 2021-09-30 | End: 2021-09-30 | Stop reason: HOSPADM

## 2021-09-30 RX ORDER — MORPHINE SULFATE 2 MG/ML
2 INJECTION, SOLUTION INTRAMUSCULAR; INTRAVENOUS EVERY 5 MIN PRN
Status: DISCONTINUED | OUTPATIENT
Start: 2021-09-30 | End: 2021-09-30 | Stop reason: HOSPADM

## 2021-09-30 RX ORDER — MAGNESIUM HYDROXIDE 1200 MG/15ML
LIQUID ORAL CONTINUOUS PRN
Status: COMPLETED | OUTPATIENT
Start: 2021-09-30 | End: 2021-09-30

## 2021-09-30 RX ORDER — PROPOFOL 10 MG/ML
INJECTION, EMULSION INTRAVENOUS PRN
Status: DISCONTINUED | OUTPATIENT
Start: 2021-09-30 | End: 2021-09-30 | Stop reason: SDUPTHER

## 2021-09-30 RX ORDER — BUPIVACAINE HYDROCHLORIDE 5 MG/ML
INJECTION, SOLUTION EPIDURAL; INTRACAUDAL PRN
Status: DISCONTINUED | OUTPATIENT
Start: 2021-09-30 | End: 2021-09-30 | Stop reason: ALTCHOICE

## 2021-09-30 RX ORDER — MIDAZOLAM HYDROCHLORIDE 2 MG/2ML
1 INJECTION, SOLUTION INTRAMUSCULAR; INTRAVENOUS EVERY 10 MIN PRN
Status: DISCONTINUED | OUTPATIENT
Start: 2021-09-30 | End: 2021-09-30 | Stop reason: HOSPADM

## 2021-09-30 RX ADMIN — MORPHINE SULFATE 2 MG: 2 INJECTION, SOLUTION INTRAMUSCULAR; INTRAVENOUS at 18:47

## 2021-09-30 RX ADMIN — CIPROFLOXACIN 400 MG: 2 INJECTION, SOLUTION INTRAVENOUS at 06:10

## 2021-09-30 RX ADMIN — ROCURONIUM BROMIDE 40 MG: 10 INJECTION INTRAVENOUS at 16:24

## 2021-09-30 RX ADMIN — Medication 4 MG: at 17:58

## 2021-09-30 RX ADMIN — FENTANYL CITRATE 25 MCG: 50 INJECTION, SOLUTION INTRAMUSCULAR; INTRAVENOUS at 17:27

## 2021-09-30 RX ADMIN — METRONIDAZOLE 500 MG: 500 INJECTION, SOLUTION INTRAVENOUS at 12:26

## 2021-09-30 RX ADMIN — PROPOFOL 200 MG: 10 INJECTION, EMULSION INTRAVENOUS at 16:24

## 2021-09-30 RX ADMIN — FENTANYL CITRATE 50 MCG: 50 INJECTION, SOLUTION INTRAMUSCULAR; INTRAVENOUS at 16:50

## 2021-09-30 RX ADMIN — FENTANYL CITRATE 25 MCG: 50 INJECTION, SOLUTION INTRAMUSCULAR; INTRAVENOUS at 17:12

## 2021-09-30 RX ADMIN — GABAPENTIN 300 MG: 600 TABLET ORAL at 13:44

## 2021-09-30 RX ADMIN — IBUPROFEN 400 MG: 400 TABLET, FILM COATED ORAL at 20:18

## 2021-09-30 RX ADMIN — IBUPROFEN 400 MG: 400 TABLET, FILM COATED ORAL at 12:32

## 2021-09-30 RX ADMIN — Medication 0.8 MG: at 17:58

## 2021-09-30 RX ADMIN — MORPHINE SULFATE 2 MG: 2 INJECTION, SOLUTION INTRAMUSCULAR; INTRAVENOUS at 18:41

## 2021-09-30 RX ADMIN — FENTANYL CITRATE 25 MCG: 50 INJECTION, SOLUTION INTRAMUSCULAR; INTRAVENOUS at 17:58

## 2021-09-30 RX ADMIN — SODIUM CHLORIDE, POTASSIUM CHLORIDE, SODIUM LACTATE AND CALCIUM CHLORIDE: 600; 310; 30; 20 INJECTION, SOLUTION INTRAVENOUS at 16:17

## 2021-09-30 RX ADMIN — ACETAMINOPHEN 1000 MG: 500 TABLET ORAL at 09:53

## 2021-09-30 RX ADMIN — ROCURONIUM BROMIDE 10 MG: 10 INJECTION INTRAVENOUS at 16:58

## 2021-09-30 RX ADMIN — Medication 0.2 MG: at 16:21

## 2021-09-30 RX ADMIN — IBUPROFEN 400 MG: 400 TABLET, FILM COATED ORAL at 09:53

## 2021-09-30 RX ADMIN — DEXTROSE AND SODIUM CHLORIDE: 5; 450 INJECTION, SOLUTION INTRAVENOUS at 09:55

## 2021-09-30 RX ADMIN — LIDOCAINE HYDROCHLORIDE 50 MG: 10 INJECTION, SOLUTION EPIDURAL; INFILTRATION; INTRACAUDAL; PERINEURAL at 16:24

## 2021-09-30 RX ADMIN — PROPOFOL 100 MG: 10 INJECTION, EMULSION INTRAVENOUS at 16:27

## 2021-09-30 RX ADMIN — SODIUM CHLORIDE, PRESERVATIVE FREE 10 ML: 5 INJECTION INTRAVENOUS at 20:18

## 2021-09-30 RX ADMIN — ONDANSETRON 4 MG: 2 INJECTION INTRAMUSCULAR; INTRAVENOUS at 19:37

## 2021-09-30 RX ADMIN — FENTANYL CITRATE 50 MCG: 50 INJECTION, SOLUTION INTRAMUSCULAR; INTRAVENOUS at 17:04

## 2021-09-30 RX ADMIN — ROCURONIUM BROMIDE 10 MG: 10 INJECTION INTRAVENOUS at 17:26

## 2021-09-30 RX ADMIN — FENTANYL CITRATE 50 MCG: 50 INJECTION, SOLUTION INTRAMUSCULAR; INTRAVENOUS at 16:24

## 2021-09-30 RX ADMIN — ACETAMINOPHEN 1000 MG: 500 TABLET ORAL at 13:44

## 2021-09-30 RX ADMIN — MORPHINE SULFATE 2 MG: 2 INJECTION, SOLUTION INTRAMUSCULAR; INTRAVENOUS at 18:29

## 2021-09-30 RX ADMIN — ONDANSETRON 4 MG: 2 INJECTION INTRAMUSCULAR; INTRAVENOUS at 18:32

## 2021-09-30 RX ADMIN — FENTANYL CITRATE 25 MCG: 50 INJECTION, SOLUTION INTRAMUSCULAR; INTRAVENOUS at 18:02

## 2021-09-30 RX ADMIN — CEFAZOLIN 2000 MG: 1 INJECTION, POWDER, FOR SOLUTION INTRAMUSCULAR; INTRAVENOUS at 16:52

## 2021-09-30 RX ADMIN — SODIUM CHLORIDE, POTASSIUM CHLORIDE, SODIUM LACTATE AND CALCIUM CHLORIDE: 600; 310; 30; 20 INJECTION, SOLUTION INTRAVENOUS at 17:25

## 2021-09-30 RX ADMIN — KETOROLAC TROMETHAMINE 30 MG: 30 INJECTION, SOLUTION INTRAMUSCULAR at 17:28

## 2021-09-30 RX ADMIN — DEXAMETHASONE SODIUM PHOSPHATE 10 MG: 10 INJECTION INTRAMUSCULAR; INTRAVENOUS at 16:37

## 2021-09-30 RX ADMIN — ACETAMINOPHEN 1000 MG: 500 TABLET ORAL at 22:23

## 2021-09-30 RX ADMIN — ONDANSETRON 4 MG: 2 INJECTION, SOLUTION INTRAMUSCULAR; INTRAVENOUS at 17:19

## 2021-09-30 RX ADMIN — FENTANYL CITRATE 50 MCG: 50 INJECTION, SOLUTION INTRAMUSCULAR; INTRAVENOUS at 16:58

## 2021-09-30 RX ADMIN — METRONIDAZOLE 500 MG: 500 INJECTION, SOLUTION INTRAVENOUS at 03:02

## 2021-09-30 ASSESSMENT — PAIN DESCRIPTION - FREQUENCY
FREQUENCY: CONTINUOUS
FREQUENCY: CONTINUOUS

## 2021-09-30 ASSESSMENT — PAIN SCALES - GENERAL
PAINLEVEL_OUTOF10: 8
PAINLEVEL_OUTOF10: 7
PAINLEVEL_OUTOF10: 7
PAINLEVEL_OUTOF10: 3
PAINLEVEL_OUTOF10: 7
PAINLEVEL_OUTOF10: 5
PAINLEVEL_OUTOF10: 2
PAINLEVEL_OUTOF10: 1
PAINLEVEL_OUTOF10: 5
PAINLEVEL_OUTOF10: 8
PAINLEVEL_OUTOF10: 1

## 2021-09-30 ASSESSMENT — PAIN DESCRIPTION - PAIN TYPE
TYPE: SURGICAL PAIN

## 2021-09-30 ASSESSMENT — PULMONARY FUNCTION TESTS
PIF_VALUE: 24
PIF_VALUE: 18
PIF_VALUE: 5
PIF_VALUE: 22
PIF_VALUE: 18
PIF_VALUE: 24
PIF_VALUE: 16
PIF_VALUE: 24
PIF_VALUE: 19
PIF_VALUE: 14
PIF_VALUE: 18
PIF_VALUE: 19
PIF_VALUE: 14
PIF_VALUE: 24
PIF_VALUE: 18
PIF_VALUE: 18
PIF_VALUE: 22
PIF_VALUE: 2
PIF_VALUE: 18
PIF_VALUE: 2
PIF_VALUE: 24
PIF_VALUE: 22
PIF_VALUE: 15
PIF_VALUE: 14
PIF_VALUE: 19
PIF_VALUE: 18
PIF_VALUE: 18
PIF_VALUE: 24
PIF_VALUE: 24
PIF_VALUE: 18
PIF_VALUE: 23
PIF_VALUE: 18
PIF_VALUE: 22
PIF_VALUE: 22
PIF_VALUE: 0
PIF_VALUE: 18
PIF_VALUE: 18
PIF_VALUE: 22
PIF_VALUE: 18
PIF_VALUE: 24
PIF_VALUE: 23
PIF_VALUE: 4
PIF_VALUE: 18
PIF_VALUE: 18
PIF_VALUE: 22
PIF_VALUE: 3
PIF_VALUE: 1
PIF_VALUE: 0
PIF_VALUE: 24
PIF_VALUE: 22
PIF_VALUE: 23
PIF_VALUE: 2
PIF_VALUE: 18
PIF_VALUE: 21
PIF_VALUE: 24
PIF_VALUE: 18
PIF_VALUE: 2
PIF_VALUE: 23
PIF_VALUE: 22
PIF_VALUE: 24
PIF_VALUE: 19
PIF_VALUE: 14
PIF_VALUE: 21
PIF_VALUE: 18
PIF_VALUE: 0
PIF_VALUE: 18
PIF_VALUE: 18
PIF_VALUE: 14
PIF_VALUE: 24
PIF_VALUE: 1
PIF_VALUE: 3
PIF_VALUE: 22
PIF_VALUE: 2
PIF_VALUE: 22
PIF_VALUE: 1
PIF_VALUE: 23
PIF_VALUE: 18
PIF_VALUE: 18
PIF_VALUE: 24
PIF_VALUE: 20
PIF_VALUE: 22
PIF_VALUE: 22
PIF_VALUE: 21
PIF_VALUE: 20
PIF_VALUE: 19
PIF_VALUE: 18
PIF_VALUE: 2
PIF_VALUE: 18
PIF_VALUE: 22
PIF_VALUE: 24
PIF_VALUE: 0
PIF_VALUE: 0
PIF_VALUE: 2
PIF_VALUE: 18
PIF_VALUE: 23
PIF_VALUE: 22
PIF_VALUE: 18
PIF_VALUE: 17
PIF_VALUE: 24
PIF_VALUE: 23
PIF_VALUE: 2
PIF_VALUE: 18
PIF_VALUE: 22
PIF_VALUE: 17
PIF_VALUE: 24
PIF_VALUE: 0
PIF_VALUE: 22

## 2021-09-30 ASSESSMENT — PAIN DESCRIPTION - LOCATION
LOCATION: ABDOMEN
LOCATION: ABDOMEN

## 2021-09-30 ASSESSMENT — PAIN - FUNCTIONAL ASSESSMENT: PAIN_FUNCTIONAL_ASSESSMENT: 0-10

## 2021-09-30 ASSESSMENT — PAIN DESCRIPTION - DESCRIPTORS
DESCRIPTORS: SHARP
DESCRIPTORS: SHARP

## 2021-09-30 NOTE — BRIEF OP NOTE
Brief Postoperative Note      Patient: Lcai Gr  YOB: 2000  MRN: [id number]    Date of Procedure: 9/30/2021    Pre-Op Diagnosis: SYMPTOMATIC CHOLECYSTITIS    Post-Op Diagnosis: Same       Procedure(s):  LAPAROSCOPIC CHOLECYSTECTOMY    Surgeon(s):  Andreea Keith MD    Assistant:  Resident: Joaquin Knox DO; Emir Michael DO    Anesthesia: General    Estimated Blood Loss (mL): Minimal    Complications: None    Specimens:   ID Type Source Tests Collected by Time Destination   A : GALLBLADDER AND CONTENTS Tissue Gallbladder SURGICAL PATHOLOGY Andreea Keith MD 9/30/2021 1745        Implants:  * No implants in log *      Drains: * No LDAs found *    Findings: as above    Electronically signed by Emir Michael DO on 9/30/2021 at [address] PM        Attending Note      I have reviewed the above TECSS note and I either performed the key elements of the procedure or was present with the resident when the key elements of the procedure were performed. I have discussed the findings, established the care plan and recommendations with resident.     Andreea eKith MD  9/30/2021  9:57 PM

## 2021-09-30 NOTE — OP NOTE
Operative Note      Patient: Jeovanny Small  YOB: 2000  MRN: [id number]    Date of Procedure: 9/30/2021    Pre-Op Diagnosis: SYMPTOMATIC CHOLECYSTITIS    Post-Op Diagnosis: Same       Procedure(s):  LAPAROSCOPIC CHOLECYSTECTOMY    Surgeon(s):  Duncan Bridges MD    Assistant:   Resident: Ranulfo Reyes DO; Payam Arndt DO    Anesthesia: General    Estimated Blood Loss (mL): Minimal    Complications: None    Specimens:   ID Type Source Tests Collected by Time Destination   A : GALLBLADDER AND CONTENTS Tissue Gallbladder SURGICAL PATHOLOGY Duncan Bridges MD 9/30/2021 1745        Implants:  * No implants in log *      Drains: * No LDAs found *    Findings: as above    Detailed Description of Procedure: The patient was seen again in the Holding Room. The risks, benefits, complications, treatment options, and expected outcomes were discussed with the patient. The possibilities of reaction to medication, pulmonary aspiration, perforation of viscus, bleeding, recurrent infection, finding a normal gallbladder, the need for additional procedures, failure to diagnose a condition, the possible need to convert to an open procedure, and creating a complication requiring transfusion or operation were discussed with the patient. The patient and/or family concurred with the proposed plan, giving informed consent. The site of surgery properly noted/marked. The patient was taken to Operating Room, identified as Jeovanny Small and the procedure verified as Laparoscopic Cholecystectomy possible open. A Time Out was held and the above information confirmed. Prior to the induction of general anesthesia, antibiotic prophylaxis was administered. General endotracheal anesthesia was then administered and tolerated well. After the induction, the abdomen was prepped in the usual sterile fashion.  The patient was positioned in the supine position with the left arm comfortably tucked, along with some reverse Trendelenburg. Local anesthetic agent was injected into the skin near the umbilicus and an incision made. incision was made infraumbilical region for 10 mm heredia cutdown port. The  Peritoneal cavity was entered under direct visualization. Pneumoperitoneum was obtained to 15mmHg. No bowel injuries/bleeding was noted once the camera was inserted. Additional trocars were introduced under direct vision, 11 mm port in subxiphoid followed by two 5mm ports in right subcostal regions. All skin incisions were infiltrated with a local anesthetic agent before making the incision and placing the trocars. The gallbladder was identified, the fundus grasped and retracted cephalad. Adhesions were lysed bluntly and with the electrocautery where indicated, taking care not to injure any adjacent organs or viscus. The infundibulum was grasped and retracted laterally, exposing the peritoneum overlying the triangle of Calot. This was then divided and exposed in a blunt fashion. The cystic duct was clearly identified and bluntly dissected circumferentially. The junctions of the gallbladder, cystic duct and common bile duct were clearly identified prior to the division of any linear structure. The cystic duct was then doubly ligated with surgical clips and was divided using laparoscopic scissors. The cystic artery was identified, doubly ligated with surgical clips and was divided using laparoscopic scissors. The gallbladder was dissected from the liver bed in retrograde fashion with the electrocautery. The gallbladder was removed. The liver bed was irrigated and inspected. Hemostasis was achieved with the electrocautery. Copious irrigation was utilized and was repeatedly aspirated until clear all particulate matter. Pneumoperitoneum was completely reduced after viewing removal of the trocars under direct vision. The gallbladder was removed. 0 Vicryl was used to close the fascia on heredia port site.  . Skin was closed using 4-0 Monocryl. Skin glue was applied. Instrument, sponge, and needle counts were correct at closure and at the conclusion of the case. Dr. Una Kuo was present for the entirety of case. Electronically signed by Giulia Hancock DO on 9/30/2021 at 6:20 PM        Attending Note      I have reviewed the above TECSS note and I either performed the key elements of the procedure or was present with the resident when the key elements of the procedure were performed. I have discussed the findings, established the care plan and recommendations with resident.     Trudie Severe, MD  9/30/2021  9:57 PM

## 2021-09-30 NOTE — CARE COORDINATION
Case Management Initial Discharge Plan  Emma Atkinson,             Met with:patient to discuss discharge plans. Information verified: address, contacts, phone number, , insurance Yes  Insurance Provider: [facility]    Emergency Contact/Next of Kin name & number: demetrio Coleman [phone]  Who are involved in patient's support system? boyfriend    PCP: GORDON Garrison - CNP  Date of last visit: 3 yrs ago      Discharge Planning    Living Arrangements:  Spouse/Significant Other     Home has 1 stories  4 stairs to climb to get into front door, 0 stairs to climb to reach second floor  Location of bedroom/bathroom in home 1st floor    Patient able to perform ADL's:Independent    Current Services (outpatient & in home) n/a  DME equipment: n/a  DME provider: n/a    Is patient receiving oral anticoagulation therapy? No    If indicated:   Physician managing anticoagulation treatment:   Where does patient obtain lab work for ATC treatment? Potential Assistance Needed:  N/A     Evaluation: n/a    Expected Discharge date:       Patient expects to be discharged to:   home    If home: is the family and/or caregiver wiling & able to provide support at home? yes  Who will be providing this support? [email or web address]    Follow Up Appointment: Best Day/ Time:      Transportation provider: demetrio  Transportation arrangements needed for discharge: No    Readmission Risk              Risk of Unplanned Readmission:  5             Does patient have a readmission risk score greater than 14?: No  If yes, follow-up appointment must be made within 7 days of discharge. Goals of Care: safety      Educated patient on transitional options, provided freedom of choice and are agreeable with plan      Discharge Plan: home independent. Denies need for further services. Has not seen PCP in 3 yrs.           Electronically signed by Kirill Vargas RN on 21 at 12:52 PM EDT

## 2021-09-30 NOTE — ANESTHESIA PRE PROCEDURE
Department of Anesthesiology  Preprocedure Note       Name:  Ellen Joshi   Age:  24 y.o.  :  2000                                          MRN:  [id number]         Date:  2021      Surgeon: Fady Dias):  Tim Lowe MD    Procedure: Procedure(s):  **ADD-ON**  LAPAROSCOPIC CHOLECYSTECTOMY    Medications prior to admission:   Prior to Admission medications    Not on File       Current medications:    Current Facility-Administered Medications   Medication Dose Route Frequency Provider Last Rate Last Admin    [facility] Hold] dextrose 5 % and 0.45 % sodium chloride infusion   IntraVENous Continuous Mame Blue,  mL/hr at 21 0955 New Bag at 21 0955    [MAR Hold] acetaminophen (TYLENOL) tablet 1,000 mg  1,000 mg Oral 3 times per day Mame Blue, DO   1,000 mg at 21 1344    [MAR Hold] ibuprofen (ADVIL;MOTRIN) tablet 400 mg  400 mg Oral Q4H Mame Blue, DO   400 mg at 21 1232    [MAR Hold] gabapentin (NEURONTIN) tablet 300 mg  300 mg Oral TID Mame Blue, DO   300 mg at 21 1344    [MAR Hold] methocarbamol (ROBAXIN) tablet 750 mg  750 mg Oral 4x Daily Mame Blue, DO        [facility] Hold] oxyCODONE (ROXICODONE) immediate release tablet 5 mg  5 mg Oral Q6H PRN Svetlana Haste, APRN - CNP        [facility] Hold] sodium chloride flush 0.9 % injection 5-40 mL  5-40 mL IntraVENous 2 times per day Tomasa Katz, DO        [facility] Hold] sodium chloride flush 0.9 % injection 5-40 mL  5-40 mL IntraVENous PRN [place] Katz, DO        [facility] Hold] 0.9 % sodium chloride infusion  25 mL IntraVENous PRN [place] Katz, DO        [facility] Hold] ondansetron (ZOFRAN-ODT) disintegrating tablet 4 mg  4 mg Oral Q8H PRN Tomasa Katz, DO        Or    [MAR Hold] 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    ASA 81 mg/dy per MFM R89.9    Positive Coxsackie B virus serologies R89.9     21 F APG 7/9 Wt 6#3 O80    Cholecystitis K81.9       Past Medical History:        Diagnosis Date    No pertinent past medical history        Past Surgical History:        Procedure Laterality Date    WISDOM TOOTH EXTRACTION         Social History:    Social History     Tobacco Use    Smoking status: Never Smoker    Smokeless tobacco: Never Used   Substance Use Topics    Alcohol use: Not Currently                                Counseling given: Not Answered      Vital Signs (Current):   Vitals:    21 1639 21 1946 21 0815 21 1558   BP: 112/69 113/72 111/85 124/79   Pulse: 75 78 72 57   Resp: 18 18 18 16   Temp: 98 °F (36.7 °C) 98.5 °F (36.9 °C) 97.6 °F (36.4 °C) 96.6 °F (35.9 °C)   TempSrc: Oral Oral Oral Temporal   SpO2: 98% 98% 98% 98%   Weight:       Height:                                                  BP Readings from Last 3 Encounters:   21 124/79   21 121/84   21 120/82       NPO Status: Time of last liquid consumption:                         Time of last solid consumption:                         Date of last liquid consumption: 21                        Date of last solid food consumption: 21    BMI:   Wt Readings from Last 3 Encounters:   21 220 lb (99.8 kg)   21 260 lb (117.9 kg)   21 244 lb (110.7 kg)     Body mass index is 35.51 kg/m².     CBC:   Lab Results   Component Value Date    WBC 4.4 2021    RBC 4.38 2021    HGB 12.6 2021    HCT 38.9 2021    MCV 88.8 2021    RDW 12.7 2021     09/30/2021       CMP:   Lab Results   Component Value Date     09/30/2021    K 3.6 09/30/2021     09/30/2021    CO2 18 09/30/2021    BUN 5 09/30/2021    CREATININE 0.61 09/30/2021    GFRAA >60 09/30/2021    LABGLOM >60 09/30/2021    GLUCOSE 120 09/30/2021    PROT 5.7 09/30/2021    CALCIUM 9.1 09/30/2021    BILITOT 3.18 09/30/2021    ALKPHOS 142 09/30/2021     09/30/2021     09/30/2021       POC Tests: No results for input(s): POCGLU, POCNA, POCK, POCCL, POCBUN, POCHEMO, POCHCT in the last 72 hours. Coags: No results found for: PROTIME, INR, APTT    HCG (If Applicable):   Lab Results   Component Value Date    HCGQUANT 17,717 (H) 11/03/2020        ABGs: No results found for: PHART, PO2ART, ZKY8PRQ, HLW2JLZ, BEART, J6JTQBTM     Type & Screen (If Applicable):  No results found for: LABABO, LABRH    Drug/Infectious Status (If Applicable):  No results found for: HIV, HEPCAB    COVID-19 Screening (If Applicable):   Lab Results   Component Value Date    COVID19 Not Detected 09/29/2021           Anesthesia Evaluation  Patient summary reviewed no history of anesthetic complications:   Airway: Mallampati: II  TM distance: >3 FB   Neck ROM: full  Mouth opening: > = 3 FB Dental:          Pulmonary:Negative Pulmonary ROS and normal exam                               Cardiovascular:Negative CV ROS            Rhythm: regular  Rate: normal                    Neuro/Psych:   Negative Neuro/Psych ROS              GI/Hepatic/Renal: Neg GI/Hepatic/Renal ROS            Endo/Other: Negative Endo/Other ROS                    Abdominal:   (+) obese,           Vascular: negative vascular ROS. Other Findings:             Anesthesia Plan      general     ASA 2       Induction: intravenous. Anesthetic plan and risks discussed with patient. Plan discussed with CRNA.                   Jamison Willis MD   9/30/2021

## 2021-09-30 NOTE — ANESTHESIA POSTPROCEDURE EVALUATION
Department of Anesthesiology  Postprocedure Note    Patient: Lorraine Molina  MRN: [id number]  YOB: 2000  Date of evaluation: 9/30/2021  Time:  6:55 PM     Procedure Summary     Date: 09/30/21 Room / Location: [address]    Anesthesia Start: 8933 Anesthesia Stop: 1817    Procedure: LAPAROSCOPIC CHOLECYSTECTOMY (N/A ) Diagnosis: (SYMPTOMATIC CHOLECYSTITIS)    Surgeons: Ventura Arndt MD Responsible Provider: Dayne Roy MD    Anesthesia Type: general ASA Status: 2          Anesthesia Type: general    Lisa Phase I: Lisa Score: 10    Lisa Phase II:      Last vitals: Reviewed and per EMR flowsheets.        Anesthesia Post Evaluation    Patient location during evaluation: PACU  Patient participation: complete - patient participated  Level of consciousness: awake and alert  Pain score: 4  Nausea & Vomiting: no nausea  Cardiovascular status: hemodynamically stable  Respiratory status: room air

## 2021-09-30 NOTE — PROGRESS NOTES
PROGRESS NOTE          PATIENT NAME: Blanca Vargas RECORD NO. [id number]  DATE: 2021  PRIMARY CARE PHYSICIAN: GORDON Jackson - CNP    HD: # [address] Problems           Last Modified POA    Cholecystitis 2021 Yes            MEDICAL DECISION MAKING AND PLAN    Neuro: MMPR: tylenol, motrin, robaxin, gabapentin, moises 5mg q4h PRN   CV: RRR  Heme: Hb 12.6. VSS  Resp: Encourage deep breathing and IS  GI:  RUQ US with cholelithiasis/sludge, no biliary duct dilation or evidence of acute cholecystitis  MRCP: CBD 6mm, cholelithiasis, trace pericholecystic fluid  LFTs remain elevated,  (160), ALT//138 (627/393), Tbili 3.18 (3.7, Dbili 2.9 (3.14)   Consented for OR today for lap harley due to symptomatic cholelithiasis   : No Celeste, Cr   MSK: No acute issues   ID: Afebrile, WBC 4.4. Continue Cipro and flagyl IV for now  Endo: No glycemic issues. Daily BMP  DVT: Restart Lovenox post op      SUBJECTIVE    Langston Macario is unchanged since yesterday. Denies abdominal pain this MA, no nausea or vomiting. Passing flatus, no BM. NPO since midnight. VSS, labs reviewed. OBJECTIVE  VITALS: Temp: Temp: 98.5 °F (36.9 °C)Temp  Av °F (36.7 °C)  Min: 97.5 °F (36.4 °C)  Max: 98.5 °F (29.1 °C) BP Systolic (17FBU), HND:628 , Min:112 , ZDX:736   Diastolic (88WZS), XCX:88, Min:64, Max:72   Pulse Pulse  Av.3  Min: 52  Max: 78 Resp Resp  Av  Min: 18  Max: 18 Pulse ox SpO2  Av %  Min: 98 %  Max: 98 %  GENERAL: alert, no distress  NEURO: positive findings: none  HEENT: normocephalic, atraumatic   : deferred  LUNGS: no increased work of breathing   HEART: normal rate and regular rhythm  ABDOMEN: soft, non-tender, non-distended and no guarding or peritoneal signs present  EXTERMITY: no cyanosis, clubbing or edema    I/O last 3 completed shifts:   In: 2991 [I.V.:2610]  Out: -     Drain/tube output:  In: 7852 [I.V.:2610]  Out: -     LAB:  CBC:   Recent Labs 09/28/21 2255 09/29/21  0526 09/30/21  0653   WBC 6.5 5.2 4.4*   HGB 13.9 13.4 12.6   HCT 41.2 40.7 38.9   MCV 84.9 85.9 88.8    213 189     BMP:   Recent Labs     09/28/21 2255 09/29/21  0526    140   K 3.9 3.9    106   CO2 21 22   BUN 9 8   CREATININE 0.60 0.57   GLUCOSE 118* 102*     COAGS:   Recent Labs     09/28/21 2255 09/29/21  0526   PROT 7.1 6.2*       RADIOLOGY:  CXR: no new images       Hailey Hope,   9/30/21, 7:49 AM        Attending Note      I have reviewed the above [facility] note(s) and I either performed the key elements of the medical history and physical exam or was present with the resident when the key elements of the medical history and physical exam were performed. I have discussed the findings, established the care plan and recommendations with Resident, TECSS RN, bedside nurse.     Ava Cornejo MD  9/30/2021  10:04 AM

## 2021-09-30 NOTE — PLAN OF CARE
Problem: Activity:  Goal: Risk for activity intolerance will decrease  Description: Risk for activity intolerance will decrease  9/30/2021 0635 by Pratima Garcia RN  Outcome: Ongoing  9/29/2021 1702 by Dominique Harding RN  Outcome: Ongoing     Problem:  Bowel/Gastric:  Goal: Bowel function will improve  Description: Bowel function will improve  9/30/2021 0635 by Pratima Garcia RN  Outcome: Ongoing  9/29/2021 1702 by Dominique Harding RN  Outcome: Ongoing  Goal: Diagnostic test results will improve  Description: Diagnostic test results will improve  9/30/2021 0635 by Pratima Garcia RN  Outcome: Ongoing  9/29/2021 1702 by Dominique Harding RN  Outcome: Ongoing  Goal: Occurrences of nausea will decrease  Description: Occurrences of nausea will decrease  9/30/2021 0635 by Pratima Garcia RN  Outcome: Ongoing  9/29/2021 1702 by Dominique Harding RN  Outcome: Ongoing  Goal: Occurrences of vomiting will decrease  Description: Occurrences of vomiting will decrease  9/30/2021 0635 by Pratima Garcia RN  Outcome: Ongoing  9/29/2021 1702 by Dominique Harding RN  Outcome: Ongoing     Problem: Fluid Volume:  Goal: Maintenance of adequate hydration will improve  Description: Maintenance of adequate hydration will improve  9/30/2021 0635 by Pratima Garcia RN  Outcome: Ongoing  9/29/2021 1702 by Dominique Harding RN  Outcome: Ongoing     Problem: Health Behavior:  Goal: Ability to state signs and symptoms to report to health care provider will improve  Description: Ability to state signs and symptoms to report to health care provider will improve  9/30/2021 0635 by Pratima Garcia RN  Outcome: Ongoing  9/29/2021 1702 by Dominique Harding RN  Outcome: Ongoing     Problem: Physical Regulation:  Goal: Complications related to the disease process, condition or treatment will be avoided or minimized  Description: Complications related to the disease process, condition or treatment will be avoided or minimized  9/30/2021 0635 by Pratima Garcia RN  Outcome: Ongoing  9/29/2021 1702 by Bill Cazares RN  Outcome: Ongoing  Goal: Ability to maintain clinical measurements within normal limits will improve  Description: Ability to maintain clinical measurements within normal limits will improve  9/30/2021 0635 by Dory Ewing RN  Outcome: Ongoing  9/29/2021 1702 by Bill Cazares RN  Outcome: Ongoing     Problem: Sensory:  Goal: Ability to identify factors that increase the pain will improve  Description: Ability to identify factors that increase the pain will improve  9/30/2021 0635 by Dory Ewing RN  Outcome: Ongoing  9/29/2021 1702 by Bill Cazares RN  Outcome: Ongoing  Goal: Ability to notify healthcare provider of pain before it becomes unmanageable or unbearable will improve  Description: Ability to notify healthcare provider of pain before it becomes unmanageable or unbearable will improve  9/30/2021 0635 by Dory Ewing RN  Outcome: Ongoing  9/29/2021 1702 by Bill Cazares RN  Outcome: Ongoing  Goal: Pain level will decrease  Description: Pain level will decrease  9/30/2021 0635 by Dory Ewing RN  Outcome: Ongoing  9/29/2021 1702 by Bill Cazares RN  Outcome: Ongoing

## 2021-10-01 VITALS
HEIGHT: 66 IN | SYSTOLIC BLOOD PRESSURE: 128 MMHG | HEART RATE: 50 BPM | DIASTOLIC BLOOD PRESSURE: 81 MMHG | RESPIRATION RATE: 17 BRPM | TEMPERATURE: 97.8 F | WEIGHT: 220 LBS | BODY MASS INDEX: 35.36 KG/M2 | OXYGEN SATURATION: 99 %

## 2021-10-01 LAB
ABSOLUTE EOS #: <0.03 K/UL (ref 0–0.44)
ABSOLUTE IMMATURE GRANULOCYTE: 0.05 K/UL (ref 0–0.3)
ABSOLUTE LYMPH #: 0.96 K/UL (ref 1.1–3.7)
ABSOLUTE MONO #: 0.62 K/UL (ref 0.1–1.4)
ANION GAP SERPL CALCULATED.3IONS-SCNC: 14 MMOL/L (ref 9–17)
BASOPHILS # BLD: 0 % (ref 0–2)
BASOPHILS ABSOLUTE: <0.03 K/UL (ref 0–0.2)
BUN BLDV-MCNC: 5 MG/DL (ref 6–20)
BUN/CREAT BLD: ABNORMAL (ref 9–20)
CALCIUM SERPL-MCNC: 9 MG/DL (ref 8.6–10.4)
CHLORIDE BLD-SCNC: 104 MMOL/L (ref 98–107)
CO2: 16 MMOL/L (ref 20–31)
CREAT SERPL-MCNC: 0.42 MG/DL (ref 0.5–0.9)
DIFFERENTIAL TYPE: ABNORMAL
EOSINOPHILS RELATIVE PERCENT: 0 % (ref 1–4)
GFR AFRICAN AMERICAN: >60 ML/MIN
GFR NON-AFRICAN AMERICAN: >60 ML/MIN
GFR SERPL CREATININE-BSD FRML MDRD: ABNORMAL ML/MIN/{1.73_M2}
GFR SERPL CREATININE-BSD FRML MDRD: ABNORMAL ML/MIN/{1.73_M2}
GLUCOSE BLD-MCNC: 128 MG/DL (ref 70–99)
HCT VFR BLD CALC: 42.5 % (ref 36.3–47.1)
HEMOGLOBIN: 13.6 G/DL (ref 11.9–15.1)
IMMATURE GRANULOCYTES: 1 %
LYMPHOCYTES # BLD: 9 % (ref 25–45)
MCH RBC QN AUTO: 28.3 PG (ref 25.2–33.5)
MCHC RBC AUTO-ENTMCNC: 32 G/DL (ref 28.4–34.8)
MCV RBC AUTO: 88.5 FL (ref 82.6–102.9)
MONOCYTES # BLD: 6 % (ref 2–8)
NRBC AUTOMATED: 0 PER 100 WBC
PDW BLD-RTO: 12.7 % (ref 11.8–14.4)
PLATELET # BLD: 224 K/UL (ref 138–453)
PLATELET ESTIMATE: ABNORMAL
PMV BLD AUTO: 11.8 FL (ref 8.1–13.5)
POTASSIUM SERPL-SCNC: 4.6 MMOL/L (ref 3.7–5.3)
RBC # BLD: 4.8 M/UL (ref 3.95–5.11)
RBC # BLD: ABNORMAL 10*6/UL
SEG NEUTROPHILS: 84 % (ref 34–64)
SEGMENTED NEUTROPHILS ABSOLUTE COUNT: 8.71 K/UL (ref 1.5–8.1)
SODIUM BLD-SCNC: 134 MMOL/L (ref 135–144)
WBC # BLD: 10.4 K/UL (ref 4.5–13.5)
WBC # BLD: ABNORMAL 10*3/UL

## 2021-10-01 PROCEDURE — 80048 BASIC METABOLIC PNL TOTAL CA: CPT

## 2021-10-01 PROCEDURE — 36415 COLL VENOUS BLD VENIPUNCTURE: CPT

## 2021-10-01 PROCEDURE — 6370000000 HC RX 637 (ALT 250 FOR IP): Performed by: STUDENT IN AN ORGANIZED HEALTH CARE EDUCATION/TRAINING PROGRAM

## 2021-10-01 PROCEDURE — 2580000003 HC RX 258: Performed by: STUDENT IN AN ORGANIZED HEALTH CARE EDUCATION/TRAINING PROGRAM

## 2021-10-01 PROCEDURE — 85025 COMPLETE CBC W/AUTO DIFF WBC: CPT

## 2021-10-01 PROCEDURE — G0378 HOSPITAL OBSERVATION PER HR: HCPCS

## 2021-10-01 PROCEDURE — 2500000003 HC RX 250 WO HCPCS: Performed by: STUDENT IN AN ORGANIZED HEALTH CARE EDUCATION/TRAINING PROGRAM

## 2021-10-01 PROCEDURE — 6360000002 HC RX W HCPCS: Performed by: STUDENT IN AN ORGANIZED HEALTH CARE EDUCATION/TRAINING PROGRAM

## 2021-10-01 RX ORDER — OXYCODONE HYDROCHLORIDE 5 MG/1
5 TABLET ORAL EVERY 6 HOURS PRN
Qty: 5 TABLET | Refills: 0 | Status: SHIPPED | OUTPATIENT
Start: 2021-10-01 | End: 2021-10-06

## 2021-10-01 RX ADMIN — METRONIDAZOLE 500 MG: 500 INJECTION, SOLUTION INTRAVENOUS at 02:23

## 2021-10-01 RX ADMIN — SODIUM CHLORIDE, PRESERVATIVE FREE 10 ML: 5 INJECTION INTRAVENOUS at 08:30

## 2021-10-01 RX ADMIN — CIPROFLOXACIN 400 MG: 2 INJECTION, SOLUTION INTRAVENOUS at 06:50

## 2021-10-01 RX ADMIN — IBUPROFEN 400 MG: 400 TABLET, FILM COATED ORAL at 00:41

## 2021-10-01 RX ADMIN — GABAPENTIN 300 MG: 600 TABLET ORAL at 08:30

## 2021-10-01 RX ADMIN — ACETAMINOPHEN 1000 MG: 500 TABLET ORAL at 07:34

## 2021-10-01 RX ADMIN — IBUPROFEN 400 MG: 400 TABLET, FILM COATED ORAL at 08:29

## 2021-10-01 RX ADMIN — IBUPROFEN 400 MG: 400 TABLET, FILM COATED ORAL at 05:21

## 2021-10-01 ASSESSMENT — PAIN SCALES - GENERAL
PAINLEVEL_OUTOF10: 4
PAINLEVEL_OUTOF10: 0
PAINLEVEL_OUTOF10: 3
PAINLEVEL_OUTOF10: 5
PAINLEVEL_OUTOF10: 5
PAINLEVEL_OUTOF10: 8
PAINLEVEL_OUTOF10: 4

## 2021-10-01 NOTE — PROGRESS NOTES
PROGRESS NOTE          PATIENT NAME: Blanca Vargas RECORD NO. [id number]  DATE: 10/1/2021  SURGEON: Dr. Afia Romero: GORDON Mosley CNP    HD: # 2    ASSESSMENT    Patient Active Problem List   Diagnosis    Lipoma of Vulva/Right Labia    ASA 81 mg/dy per MFM    Positive Coxsackie B virus serologies     21 F APG 7/9 Wt 6#3    Cholecystitis       MEDICAL DECISION MAKING AND PLAN  44-year-old female postop day 1 status post lap cholecystectomy    Patient seen examined at bedside this morning. General diet as tolerated  Pain control-Multimodal  We will plan for discharge home later today. Discussed postoperative instructions with patient, all questions and concerns answered. SUBJECTIVE    Jarett Vang was seen and examined at bedside this morning. No acute events overnight. Afebrile. Tolerating general diet. Out of bed ambulating. Pain well controlled. Incisions intact. OBJECTIVE  VITALS: Temp: Temp: 97.8 °F (36.6 °C)Temp  Av.9 °F (35.5 °C)  Min: 95.1 °F (35.1 °C)  Max: 98.1 °F (33.4 °C) BP Systolic (01UUU), TDO:944 , Min:87 , RMD:315   Diastolic (83LYN), BIS:61, Min:55, Max:97   Pulse Pulse  Av.5  Min: 50  Max: 72 Resp Resp  Av.9  Min: 0  Max: 26 Pulse ox SpO2  Av %  Min: 96 %  Max: 100 %  GENERAL: alert, no distress  NEURO: Cranial nerves II through XII intact  HEENT: Neck supple, trachea midline  LUNGS: clear to ausculation, without wheezes, rales or rhonci  HEART: normal rate and regular rhythm  ABDOMEN: soft, non-tender, non-distended, port site incisions healing well, no bleeding or drainage noted  EXTREMITY: no cyanosis, clubbing or edema    I/O last 3 completed shifts: In: 1600 [P.O.:400; I.V.:1200]  Out: 500 [Urine:500]    Drain/tube output: In: 1600 [P.O.:400;  I.V.:1200]  Out: 500 [Urine:500]    LAB:  CBC:   Recent Labs     21  0526 21  0653 10/01/21  0504   WBC 5.2 4.4* 10.4   HGB 13.4 12.6 13.6 HCT 40.7 38.9 42.5   MCV 85.9 88.8 88.5    189 224     BMP:   Recent Labs     09/29/21  0526 09/30/21  0653 10/01/21  0504    139 134*   K 3.9 3.6* 4.6    108* 104   CO2 22 18* 16*   BUN 8 5* 5*   CREATININE 0.57 0.61 0.42*   GLUCOSE 102* 120* 128*     COAGS:   Recent Labs     09/28/21  2255 09/29/21  0526 09/30/21  0653   PROT 7.1 6.2* 5.7*         Corie Langley DO  10/1/21, 10:20 AM        Attending Note    Discharge home  I have reviewed the above TEC note(s) and I either performed the key elements of the medical history and physical exam or was present with the resident when the key elements of the medical history and physical exam were performed. I have discussed the findings, established the care plan and recommendations with Resident, TECSS RN, bedside nurse.     William Burnett MD  10/1/2021  10:32 AM

## 2021-10-01 NOTE — PROGRESS NOTES
Pt's IV removed, no complications. Pt given discharge instructions, verbalized understanding. Pt refused script for pain medication due to fact that she is breast feeding.  Pt discharged to home for self care

## 2021-10-01 NOTE — PROGRESS NOTES
POST OP NOTE    SUBJECTIVE  Pt s/p laparoscopic cholecystectomy. Pain controlled at 4/10, resting comfortably. Not passing gas yet, tolerating PO. Notes some initial nausea post op, now improved, no emesis. Hitting 1500 on IS. No other complaints at this time. OBJECTIVE  VITALS:  /86   Pulse 50   Temp 97.3 °F (36.3 °C) (Axillary)   Resp 18   Ht 5' 6\" (1.676 m)   Wt 220 lb (99.8 kg)   SpO2 99%   BMI 35.51 kg/m²         GENERAL:  awake and alert. No acute distress  CARDIOVASCULAR:  regular rate and rhythm   LUNGS:  CTA Bilaterally  ABDOMEN:   Appropriately tender s/p post-op, soft  INCISION: Incision clean/dry/intact    ASSESSMENT  1. POD# 0 s/p laparoscopic cholecystectomy    PLAN  1. Pain management-Tylenol, Neurontin,Ibuprofen, Robaxin  2. DVT proph- SCD's, resume POD#1  3. Regular diet  4. Continue IS  5.  F/u AM Hgb      Therese Chawla MD  Trauma/Surgery Service  10/1/2021 at 4:14 AM

## 2021-10-01 NOTE — DISCHARGE SUMMARY
DISCHARGE SUMMARY:    PATIENT NAME:  Cady Bradley  YOB: 2000  MEDICAL RECORD NO. [id number]  DATE: 10/01/21  PRIMARY CARE PHYSICIAN: GORDON Laurent CNP  ADMIT DATE:  2021    DISCHARGE DATE:  10/1/2021  DISPOSITION:  home  ADMITTING DIAGNOSIS:   cholecystitis    DIAGNOSIS:   Patient Active Problem List   Diagnosis    Lipoma of Vulva/Right Labia    ASA 81 mg/dy per MFM    Positive Coxsackie B virus serologies     21 F APG 7/9 Wt 6#3    Cholecystitis       CONSULTANTS:  None    PROCEDURES:   : OR-lap harley    HOSPITAL COURSE:   Cady Bradley is a 24 y.o. female who was admitted on 2021  Hospital Course:  C/s cholecystitis.  CT A/P with cholecystitis and cholelithiasis. Returned for increased pain. : increased LFTs and bili 3.71. MRCP: Cholelithiasis, no acute cholecystitis, CBD 6mm  : OR lap cholecystectomy    Labs and imaging were followed daily. On day of discharge Cady Bradley  was tolerating a regular diet  had adequate analgeia on oral medications  had no signs of complication. She was deemed medically stable for discharged to Home        PHYSICAL EXAMINATION:        Discharge Vitals:  height is 5' 6\" (1.676 m) and weight is 220 lb (99.8 kg). Her oral temperature is 97.8 °F (36.6 °C). Her blood pressure is 128/81 and her pulse is 50. Her respiration is 17 and oxygen saturation is 99%.    Exam on day of discharge:  GENERAL: alert, no distress  NEURO: Cranial nerves II through XII intact  HEENT: Neck supple, trachea midline  LUNGS: clear to ausculation, without wheezes, rales or rhonci  HEART: normal rate and regular rhythm  ABDOMEN: soft, non-tender, non-distended, port site incisions healing well, no bleeding or drainage noted  EXTREMITY: no cyanosis, clubbing or edema    LABS:     Recent Labs     21  0526 21  0653 10/01/21  0504   WBC 5.2 4.4* 10.4   HGB 13.4 12.6 13.6   HCT 40.7 38.9 42.5    189 224    139 134*   K 3.9 3.6* 4.6    108* 104   CO2 22 18* 16*   BUN 8 5* 5*   CREATININE 0.57 0.61 0.42*       DIAGNOSTIC TESTS:    US GALLBLADDER RUQ    Result Date: 9/29/2021  EXAMINATION: RIGHT UPPER QUADRANT ULTRASOUND 9/29/2021 7:39 am COMPARISON: CT abdomen and pelvis September 16, 2021. HISTORY: ORDERING SYSTEM PROVIDED HISTORY: r/o choledocholithiasis TECHNOLOGIST PROVIDED HISTORY: r/o choledocholithiasis FINDINGS: LIVER:  The liver demonstrates normal echogenicity without evidence of intrahepatic biliary ductal dilatation. BILIARY SYSTEM:  Stones and sludge seen within the gallbladder lumen. No gallbladder wall thickening or pericholecystic fluid. Negative Lara's sign. Common bile duct is within normal limits measuring 6.2 mm. RIGHT KIDNEY: The right kidney is grossly unremarkable without evidence of hydronephrosis. PANCREAS:  Not visualized. OTHER: No evidence of right upper quadrant ascites. 1. Cholelithiasis/sludge seen within the gallbladder lumen. No ultrasound evidence of acute cholecystitis. No biliary dilatation is seen to suggest choledocholithiasis. MRI ABDOMEN WO CONTRAST MRCP    Result Date: 9/29/2021  EXAMINATION: MRI OF THE ABDOMEN WITHOUT CONTRAST AND MRCP 9/29/2021 12:27 pm TECHNIQUE: Multiplanar multisequence MRI of the abdomen was performed without the administration of intravenous contrast.  After initial T2 axial and coronal images, thick slab, thin slab and 3D coronal MRCP sequences were obtained without the administration of intravenous contrast.  MIP images are provided for review. COMPARISON: 09/16/2021 CT and 09/29/2021 ultrasound HISTORY: ORDERING SYSTEM PROVIDED HISTORY: r/o choledocho TECHNOLOGIST PROVIDED HISTORY: r/o choledocho Is the patient pregnant?->No Reason for Exam: rule out choledocho FINDINGS: Gallbladder: Multiple stones are observed in the gallbladder lumen with a dominant stone near the neck measuring 17 mm. No gallbladder mucosal thickening.   Trace amount of pericholecystic fluid. Bile Ducts: The intrahepatic biliary ducts are normal.  The cystic duct and common hepatic duct are normal.  The common bile duct is also normal measuring 6 mm. No intraluminal filling defect. Pancreatic Duct: Pancreatic duct is normal. Other:  No other significant finding in the abdomen. 1. Cholelithiasis with no evidence of acute cholecystitis. 2. No biliary dilatation. 3. Trace amount of pericholecystic fluid is nonspecific. DISCHARGE INSTRUCTIONS     Discharge Medications:        Medication List        START taking these medications      oxyCODONE 5 MG immediate release tablet  Commonly known as: ROXICODONE  Take 1 tablet by mouth every 6 hours as needed for Pain for up to 5 days. Notes to patient: As needed               Where to Get Your Medications        These medications were sent to [facility] [address] [facility], [address] [facility]  [address], [address] [address]      Phone: [phone]   oxyCODONE 5 MG immediate release tablet       Diet: ADULT DIET; Regular diet as tolerated  Activity: As instructed WEIGHT BEARING STATUS: Weight bearing as tolerated  Wound Care: Daily and as needed. DISPOSITION: Home    Follow-up:  GORDON Alexander CNP  [address] [facility] [address]  [address] [facility]  [address] [facility]  [address] [facility] [address] [place] [address]  [phone]  On 10/12/2021  For wound re-check post op. [address] [facility]  [phone]    Call  As needed        SIGNED:  GORDON Webster CNP   10/1/2021, 1:29 PM  Time Spent for discharge: 30 minutes        Attending Note      I have reviewed the above TECSS note(s) and I either performed the key elements of the medical history and physical exam or was present with the resident when the key elements of the medical history and physical exam were performed. I have discussed the findings, established the care plan and recommendations with Resident, TECSS RN, bedside nurse.     William Burnett MD  10/4/2021  11:06 AM

## 2021-10-04 ENCOUNTER — CARE COORDINATION (OUTPATIENT)
Dept: CASE MANAGEMENT | Age: 21
End: 2021-10-04

## 2021-10-04 LAB — SURGICAL PATHOLOGY REPORT: NORMAL

## 2021-10-04 NOTE — CARE COORDINATION
[facility] Transitions Initial Follow Up Call    Call within 2 business days of discharge: Yes    Patient: Kiera Licea Patient : 2000   MRN: <[id number]>  Reason for Admission: abdominal pain  Discharge Date: 10/1/21 RARS: Readmission Risk Score: 5      Last Discharge [facility]       Complaint Diagnosis Description Type Department Provider    21 Abdominal Pain Acute cholecystitis ED to Hosp-Admission (Discharged) (ADMITTED) EMI 4C ONC Sanket Rivera MD; Maggie Else. .. Spoke with: 24 HOUR INITIAL CALL. 1st attempt. No answer. Left HIPPA compliant message to return call to this writer. Call back from Selvin. Selvin states she is doing fine. No fever, no chills. Denies pain. Appetite is \"better now\" bowel and bladder WNL . laparoscopic incisions are healing well with no redness and small amt of drainage. Reviewed medications. Pt. Declined to  Roxicodone because she is breast feeding. Non [facility] provider. Pt states she will make follow up appt for surgeon. Phone number provided to [address] [place] [address] trauma clinic. Maury Birmingham denies need for [facility] or [facility] at this time. Final call. Transitions of Care Initial Call    Was this an external facility discharge? No Discharge Facility: n/a    Challenges to be reviewed by the provider   Additional needs identified to be addressed with provider: No  none             Method of communication with provider : none      Advance Care Planning:   Does patient have an Advance Directive: reviewed and current. Was this a readmission? No  Patient stated reason for admission: abdominal pain  Patients top risk factors for readmission: medical condition-abdominal pain  cholecystitis    Care Transition Nurse (CTN) contacted the patient by telephone to perform post hospital discharge assessment. Verified name and  with patient as identifiers. Provided introduction to self, and explanation of the CTN role.      CTN reviewed discharge instructions, medical action plan and red flags with patient who verbalized understanding. Patient given an opportunity to ask questions and does not have any further questions or concerns at this time. Were discharge instructions available to patient? Yes. Reviewed appropriate site of care based on symptoms and resources available to patient including: PCP, Specialist, When to call 911 and [address]. The patient agrees to contact the PCP office for questions related to their healthcare. Medication reconciliation was performed with patient, who verbalizes understanding of administration of home medications. Advised obtaining a 90-day supply of all daily and as-needed medications. Covid Risk Education     Educated patient about risk for severe COVID-19 due to risk factors according to CDC guidelines. LPN CC reviewed discharge instructions, medical action plan and red flag symptoms with the patient who verbalized understanding. Discussed COVID vaccination status: Yes. Education provided on COVID-19 vaccination as appropriate. Discussed exposure protocols and quarantine with CDC Guidelines. Patient was given an opportunity to verbalize any questions and concerns and agrees to contact LPN CC or health care provider for questions related to their healthcare. Reviewed and educated patient on any new and changed medications related to discharge diagnosis. Was patient discharged with a pulse oximeter? No Discussed and confirmed pulse oximeter discharge instructions and when to notify provider or seek emergency care. LPN CC provided contact information. No further follow-up call indicated based on severity of symptoms and risk factors.   Plan for next call: n/a        Facility: [address] [place] [address]    Non-face-to-face services provided:  Obtained and reviewed discharge summary and/or continuity of care documents    Care Transitions 24 Hour Call    Care Transitions Interventions         Follow Up  Future Appointments   Date Time Provider Judy Bautista   10/8/2021  9:30 AM Lesley Curry [address], [address] [facility] Care Transitions Nurse   [phone]

## 2021-10-08 ENCOUNTER — OFFICE VISIT (OUTPATIENT)
Dept: OBGYN CLINIC | Age: 21
End: 2021-10-08
Payer: COMMERCIAL

## 2021-10-08 VITALS
HEART RATE: 87 BPM | HEIGHT: 66 IN | WEIGHT: 230 LBS | SYSTOLIC BLOOD PRESSURE: 120 MMHG | DIASTOLIC BLOOD PRESSURE: 66 MMHG | BODY MASS INDEX: 36.96 KG/M2

## 2021-10-08 DIAGNOSIS — D17.9 LIPOMA, UNSPECIFIED SITE: ICD-10-CM

## 2021-10-08 DIAGNOSIS — Z01.419 WOMEN'S ANNUAL ROUTINE GYNECOLOGICAL EXAMINATION: Primary | ICD-10-CM

## 2021-10-08 DIAGNOSIS — D17.30 LIPOMA OF SKIN AND SUBCUTANEOUS TISSUE: Primary | ICD-10-CM

## 2021-10-08 PROCEDURE — 99395 PREV VISIT EST AGE 18-39: CPT | Performed by: NURSE PRACTITIONER

## 2021-10-08 ASSESSMENT — PATIENT HEALTH QUESTIONNAIRE - PHQ9
SUM OF ALL RESPONSES TO PHQ QUESTIONS 1-9: 0
2. FEELING DOWN, DEPRESSED OR HOPELESS: 0
SUM OF ALL RESPONSES TO PHQ QUESTIONS 1-9: 0
1. LITTLE INTEREST OR PLEASURE IN DOING THINGS: 0
SUM OF ALL RESPONSES TO PHQ QUESTIONS 1-9: 0
SUM OF ALL RESPONSES TO PHQ9 QUESTIONS 1 & 2: 0

## 2021-10-08 NOTE — PROGRESS NOTES
History and Physical  [address], [address] [facility] [address], [address]. [phone]   Fax [phone]  Marianela Deras  10/8/2021              21 y.o. Chief Complaint   Patient presents with    Gynecologic Exam       No LMP recorded. Primary Care Physician: GORDON García CNP    The patient was seen and examined. She has no chief complaint today and is here for her annual exam.  Her bowels are regular. There are no voiding complaints. She denies any bloating. She denies vaginal discharge and was counseled on STD's and the need for barrier contraception. HPI : Marianela Deras is a 24 y.o. female     Annual exam  Hx of cholecystectomy 1 week ago.   No chief complaint  Currently breastfeeding her 2 month old daughter.  ________________________________________________________________________  Sha Cuco History    Para Term  AB Living   1 1 1 0 0 1   SAB TAB Ectopic Molar Multiple Live Births   0 0 0 0 0 1      # Outcome Date GA Lbr John/2nd Weight Sex Delivery Anes PTL Lv   1 Term 21 37w6d / 00:59 6 lb 3.5 oz (2.82 kg) F Vag-Spont EPI N OPHELIA      Name: Trinity Ji: 7  Apgar5: 9     Past Medical History:   Diagnosis Date    No pertinent past medical history                                                                    Past Surgical History:   Procedure Laterality Date    CHOLECYSTECTOMY, LAPAROSCOPIC  2021    LAPAROSCOPIC CHOLECYSTECTOMY     CHOLECYSTECTOMY, LAPAROSCOPIC N/A 2021    LAPAROSCOPIC CHOLECYSTECTOMY performed by Mo [facility] at [facility] EXTRACTION       Family History   Problem Relation Age of Onset    Diabetes Maternal Grandmother      Social History     Socioeconomic History    Marital status: Single     Spouse name: Not on file    Number of children: Not on file    Years of education: Not on file    Highest education level: Not on file Occupational History    Not on file   Tobacco Use    Smoking status: Never Smoker    Smokeless tobacco: Never Used   Vaping Use    Vaping Use: Never used   Substance and Sexual Activity    Alcohol use: Not Currently    Drug use: Never    Sexual activity: Yes     Partners: Male   Other Topics Concern    Not on file   Social History Narrative    Not on file     Social Determinants of Health     Financial Resource Strain: Low Risk     Difficulty of Paying Living Expenses: Not hard at all   Food Insecurity: No Food Insecurity    Worried About [address] in the Last Year: Never true    [address] [facility] [address] in the Last Year: Never true   Transportation Needs:     Lack of Transportation (Medical):  Lack of Transportation (Non-Medical):    Physical Activity:     Days of Exercise per Week:     Minutes of Exercise per Session:    Stress:     Feeling of Stress :    Social Connections:     Frequency of Communication with Friends and Family:     Frequency of Social Gatherings with Friends and Family:     Attends Uatsdin Services:     Active Member of Clubs or Organizations:     Attends Club or Organization Meetings:     Marital Status:    Intimate Partner Violence:     Fear of Current or Ex-Partner:     Emotionally Abused:     Physically Abused:     Sexually Abused:        MEDICATIONS:  No current outpatient medications on file. No current facility-administered medications for this visit. ALLERGIES:  Allergies as of 10/08/2021    (No Known Allergies)       Symptoms of decreased mood absent  Symptoms of anhedonia absent    **If either question is answered in a  positive fashion then complete the PHQ9 Scoring Evaluation and make the appropriate referral**      Immunization status: stated as current, but no records available. Gynecologic History:  Menarche: 15 yo  Menopause at [age] yo     No LMP recorded.     Sexually Active: Yes    STD History: Yes chlamydia     Permanent ROS: No Rash, Itching, Hives, Mole Changes or Cancer                                                                                                                                                                                                                                  PHYSICAL Exam:     Constitutional:  Vitals:    10/08/21 0945   BP: 120/66   Site: Right Upper Arm   Position: Sitting   Cuff Size: Medium Adult   Pulse: 87   Weight: 230 lb (104.3 kg)   Height: 5' 6\" (1.676 m)       Chaperone for Intimate Exam   Chaperone was offered and accepted as part of the rooming process.  Chaperone: Charlotte          General Appearance: This  is a well Developed, well Nourished, well groomed female. Her BMI was reviewed. Nutritional decision making was discussed. Skin:  There was a Normal Inspection of the skin without rashes or lesions. There were no rashes. (Papular, Maculopapular, Hives, Pustular, Macular)     There were no lesions (Ulcers, Erythema, Abn. Appearing Nevi)            Lymphatic:  No Lymph Nodes were Palpable in the neck , axilla or groin.  0 # Of Lymph Nodes; Location ; Character [Normal]  [Shotty] [Tender] [Enlarged]     Neck and EENT:  The neck was supple. There were no masses   The thyroid was not enlarged and had no masses. Perrla, EOMI B/L, TMI B/L No Abnormalities. Throat inspected-No exudates or Masses, Nares Patent No Masses        Respiratory: The lungs were auscultated and found to be clear. There were no rales, rhonchi or wheezes. There was a good respiratory effort. Cardiovascular: The heart was in a regular rate and rhythm. . No S3 or S4. There was no murmur appreciated. Location, grade, and radiation are not applicable. Extremities: The patients extremities were without calf tenderness, edema, or varicosities. There was full range of motion in all four extremities. Pulses in all four extremities were appreciated and are 2/4. Abdomen:   The abdomen was soft and non-tender. There were good bowel sounds in all quadrants and there was no guarding, rebound or rigidity. On evaluation there was no evidence of hepatosplenomegaly and there was no costal vertebral trisha tenderness bilaterally. No hernias were appreciated. Abdominal Scars: cholecystectomy scar    Psych: The patient had a normal Orientation to: Time, Place, Person, and Situation  There is no Mood / Affect changes    Breast:  (Chest)  normal appearance, no masses or tenderness  Self breast exams were reviewed in detail. Literature was given. Pelvic Exam:  Vulva and vagina appear normal, bimanual exam reveals normal uterus and adnexa. Right labia enlarged- soft, nontender to palpation. No solid mass noted. ? Lipoma. Rectal Exam:  exam declined by patient          Musculosk:  Normal Gait and station was noted. Digits were evaluated without abnormal findings. Range of motion, stability and strength were evaluated and found to be appropriate for the patients age. ASSESSMENT:      24 y.o. Annual   Diagnosis Orders   1. Women's annual routine gynecological examination  PAP SMEAR   2. Lipoma, unspecified site            Chief Complaint   Patient presents with    Gynecologic Exam          Past Medical History:   Diagnosis Date    No pertinent past medical history          Patient Active Problem List    Diagnosis Date Noted    Positive Coxsackie B virus serologies 2021     Priority: High    Lipoma of Vulva/Right Labia 2020     Priority: High     GYN ONC Referral      Cholecystitis 2021     21 F APG 7/9 Wt 6#3 2021    ASA 81 mg/dy per MFM 2020          Hereditary Breast, Ovarian, Colon and Uterine Cancer screening Done. Tobacco & Secondary smoke risks reviewed; instructed on cessation and avoidance      Counseling Completed:  Preventative Health Recommendations and Follow up.     The patient was informed of the recommended preventative health recommendations. 1. Annuals every year; Cytology collections per prevailing guidelines. 2. Mammograms begin every year at 37 yo if no abnormalities are found and no family history. 3. Bone density studies every 2-3 years. Begin at 71 yo. If no fracture history or osteoporosis family history. (significant). 4. Colonoscopy begin at 38 yo. Repeat every ten years if negative and no family history. 5. Calcium of 9472-6894 mg/day in split dosing  6. Vitamin D 400-800 IU/day  7. All other preventative health recommendations will be managed by the patients Primary care physician. PLAN:  Return in about 1 year (around 10/8/2022) for annual.   Pap smear obtained. Referral to DR Gwenevere Fleischer- evaluation of right labia-?lipoma. Repeat Annual every 1 year  Cervical Cytology Evaluation begins at 24years old. If Negative Cytology, Follow-up screening per current guidelines. Mammograms every 1 year. If 37 yo and last mammogram was negative. Calcium and Vitamin D dosing reviewed. Colonoscopy screening reviewed as well as onset for bone density testing. Birth control and barrier recommendations discussed. STD counseling and prevention reviewed. Gardisil counseling completed for all patients 10-37 yo. Routine health maintenance per patients PCP. Orders Placed This Encounter   Procedures    PAP SMEAR     Standing Status:   Future     Standing Expiration Date:   10/7/2022     Order Specific Question:   Collection Type     Answer: Thin Prep     Order Specific Question:   Prior Abnormal Pap Test     Answer:   No     Order Specific Question:   Screening or Diagnostic     Answer:   Screening     Order Specific Question:   HPV Requested? Answer:   N/A     Order Specific Question:   High Risk Patient     Answer:   N/A           The patient, Yusef Marie is a 24 y.o. female, was seen with a total time spent of 20 minutes for the visit on this date of service by the E/M provider.  The time component had both face to

## 2021-10-19 ENCOUNTER — OFFICE VISIT (OUTPATIENT)
Dept: SURGERY | Age: 21
End: 2021-10-19
Payer: COMMERCIAL

## 2021-10-19 VITALS — DIASTOLIC BLOOD PRESSURE: 79 MMHG | SYSTOLIC BLOOD PRESSURE: 122 MMHG | WEIGHT: 233 LBS | BODY MASS INDEX: 37.61 KG/M2

## 2021-10-19 DIAGNOSIS — K81.9 CHOLECYSTITIS: Primary | ICD-10-CM

## 2021-10-19 PROCEDURE — 99024 POSTOP FOLLOW-UP VISIT: CPT | Performed by: SPECIALIST

## 2021-10-19 PROCEDURE — 99202 OFFICE O/P NEW SF 15 MIN: CPT | Performed by: SPECIALIST

## 2021-10-19 ASSESSMENT — ENCOUNTER SYMPTOMS
CONSTIPATION: 0
NAUSEA: 0
DIARRHEA: 0
ABDOMINAL PAIN: 0
VOMITING: 0

## 2021-10-19 NOTE — PROGRESS NOTES
[facility] New Patient Visit      CHIEF COMPLAINT:    Chief Complaint   Patient presents with    Wound Check     here for wound check       HISTORY OF PRESENT ILLNESS:      The patient is a 24 y.o. female who is being seen for consultation and evaluation of post op cholecystectomy 9/30. Patient reports very pleased with results; eating and moving bowels well; no fevers or abdominal pain. Past Medical History:    Past Medical History:   Diagnosis Date    No pertinent past medical history        Past SurgicalHistory:    Past Surgical History:   Procedure Laterality Date    CHOLECYSTECTOMY, LAPAROSCOPIC  09/30/2021    LAPAROSCOPIC CHOLECYSTECTOMY     CHOLECYSTECTOMY, LAPAROSCOPIC N/A 9/30/2021    LAPAROSCOPIC CHOLECYSTECTOMY performed by Lisa Nuñez MD at [facility]         Current Medications:   No current outpatient medications on file. No current facility-administered medications for this visit. Allergies:    Patient has no known allergies.     Social History:   Social History     Socioeconomic History    Marital status: Single     Spouse name: Not on file    Number of children: Not on file    Years of education: Not on file    Highest education level: Not on file   Occupational History    Not on file   Tobacco Use    Smoking status: Never Smoker    Smokeless tobacco: Never Used   Vaping Use    Vaping Use: Never used   Substance and Sexual Activity    Alcohol use: Not Currently    Drug use: Never    Sexual activity: Yes     Partners: Male   Other Topics Concern    Not on file   Social History Narrative    Not on file     Social Determinants of Health     Financial Resource Strain: Low Risk     Difficulty of Paying Living Expenses: Not hard at all   Food Insecurity: No Food Insecurity    Worried About Running Out of Food in the Last Year: Never true    Jer of Food in the Last Year: Never true   Transportation Needs:     Lack of Transportation (Medical):  Lack of Transportation (Non-Medical):    Physical Activity:     Days of Exercise per Week:     Minutes of Exercise per Session:    Stress:     Feeling of Stress :    Social Connections:     Frequency of Communication with Friends and Family:     Frequency of Social Gatherings with Friends and Family:     Attends Confucianism Services:     Active Member of Clubs or Organizations:     Attends Club or Organization Meetings:     Marital Status:    Intimate Partner Violence:     Fear of Current or Ex-Partner:     Emotionally Abused:     Physically Abused:     Sexually Abused:        Family History:  Family History   Problem Relation Age of Onset    Diabetes Maternal Grandmother        Review of Systems   Constitutional: Negative for chills and fever. Gastrointestinal: Negative for abdominal pain, constipation, diarrhea, nausea and vomiting. PHYSICAL EXAM:  /79   Wt 233 lb (105.7 kg)   BMI 37.61 kg/m²   CONSTITUTIONAL: awake, alert, cooperative, no apparent distress  Physical Exam  Vitals and nursing note reviewed. Constitutional:       General: She is not in acute distress. Appearance: She is well-developed. HENT:      Head: Normocephalic and atraumatic. Cardiovascular:      Rate and Rhythm: Normal rate. Pulmonary:      Effort: Pulmonary effort is normal. No respiratory distress. Abdominal:      Palpations: Abdomen is soft. Tenderness: There is no abdominal tenderness. There is no guarding or rebound. Musculoskeletal:         General: Normal range of motion. Cervical back: Normal range of motion and neck supple. Skin:     General: Skin is warm and dry. Capillary Refill: Capillary refill takes less than 2 seconds. Comments: Surgical sites have healed very well   Neurological:      General: No focal deficit present. Mental Status: She is alert and oriented to person, place, and time.    Psychiatric:         Mood and Affect: Mood normal. Behavior: Behavior normal.         Thought Content: Thought content normal.         Judgment: Judgment normal.         Radiology:       ASSESSMENT:     1. Cholecystitis         PLAN:  -f/u only as needed      Mikki Gomez, [facility], [place], [place]   1:48 PM 10/19/2021     Trauma, Emergency and Critical Surgical Services  Attending Progress Note   I have discussed the care of this patient including pertinent history and exam findings,  with the CNP. . I have seen and examined the patient and the key elements of all parts of the encounter have been performed by me. I agree with the assessment, plan and orders as documented by the CNP.      Electronically signed by Gavi Santiago MD on 10/19/2021 at 2:52 PM

## 2021-10-21 ENCOUNTER — OFFICE VISIT (OUTPATIENT)
Dept: GYNECOLOGIC ONCOLOGY | Age: 21
End: 2021-10-21
Payer: COMMERCIAL

## 2021-10-21 VITALS
SYSTOLIC BLOOD PRESSURE: 135 MMHG | HEART RATE: 77 BPM | TEMPERATURE: 98.1 F | OXYGEN SATURATION: 96 % | DIASTOLIC BLOOD PRESSURE: 86 MMHG | HEIGHT: 66 IN | WEIGHT: 232.6 LBS | BODY MASS INDEX: 37.38 KG/M2

## 2021-10-21 DIAGNOSIS — D17.30 LIPOMA OF SKIN AND SUBCUTANEOUS TISSUE: Primary | ICD-10-CM

## 2021-10-21 PROCEDURE — 99203 OFFICE O/P NEW LOW 30 MIN: CPT | Performed by: OBSTETRICS & GYNECOLOGY

## 2021-10-21 ASSESSMENT — ENCOUNTER SYMPTOMS
EYES NEGATIVE: 1
CONSTIPATION: 1
ABDOMINAL PAIN: 1
RESPIRATORY NEGATIVE: 1

## 2021-10-21 NOTE — PROGRESS NOTES
[address] [place] [address], [facility], [address] [address]      I am seeing Yusef Marie for New Patient at the request of Dr. Lawanda Tejeda, OB/GYN, [address] Chief Complaint: Lipoma of vulva    HPI  She is a 24 y.o.  1 para 3 female who is being referred for a vulvar lipoma. The patient states that she noticed swelling in the right vulva about 2 years ago. It was not bothering her. She stated that it was noticed by her OB/GYN when she came in for obstetrical care. She states that she does not believe it has grown in size. The patient did have a vaginal childbirth in May. She is currently breast-feeding. She states that the swelling from the right vulvar lipoma has never bothered her. She states that she does wish to have it removed, however. She recently had a cholecystectomy for acute cholecystitis. Prior to the surgery an abdominal pelvic CT scan and MRI were performed. The CT scan covered the pelvis and noted the \"right vulvar lipoma \". No other pathology was noted. Patient states that she has no other medical problems. She is on no medications. Review of Systems  I have personally reviewed and agree with the review of systems done by my ancillary staff in the [facility] documentation. OBJECTIVE:  Vitals:    10/21/21 1504 10/21/21 1508   BP: (!) 144/85 135/86   Site: Left Upper Arm    Position: Sitting    Cuff Size: Medium Adult    Pulse: 77    Temp: 98.1 °F (36.7 °C)    SpO2: 96%    Weight: 232 lb 9.6 oz (105.5 kg)    Height: 5' 6\" (1.676 m)        General: Alert and oriented x3, no acute distress    HEENT:  EOM intact, NOLBERTO, y. Lower Extremities:  No lymphedema, no calf tenderness, no musculoskeletal deformities. Pelvic Exam: There is a bulge in the mid right vulva. There are no other lesions on the vulva. There is no evidence of cellulitis. Inguinal: No adenopathy in either groin.     Palpation of the left vulva reveals no evidence of any lesions. Palpation of the right vulva reveals a mobile mass in the mid vulva measuring approximately 6 to 7 cm in AP diameter and approximately 3-1/2 cm in lateral dimension. It is nontender to palpation. It is not fixed to underlying tissue. Family History   Problem Relation Age of Onset    Diabetes Maternal Grandmother        Assessment:  1. Lipoma of Vulva/Right Labia        Discussion: A discussion was held with the patient regarding management options. 1 option would be to observe. I do feel however that eventually the mass will enlarge to the point where it is causing symptoms. It is not currently causing symptoms however. The other option would be to proceed with removal.  The patient desires this option. The risks of the procedure are as delineated below. The primary risk would be postoperative infection. The benefits of the procedure would be to remove the mass prior to it causing problems. There is a remote possibility of malignancy along with its consequences. Plan: Excision of right labial lipomatis mass. Follow Up: Postoperatively      INFORMED CONSENT:  We discussed options including but not limited to observation, and surgical removal.  Patient desires surgical removal..  Benefits of the procedure(s) include but not limited to treatment, possible staging of precancerous/cancerous lesions and/or improvement in systems and quality of life. The procedure(s) were explained in great detail along with all the possible risks and complications including, but not limited to blood loss requiring transfusions, DVT requiring blood thinning agents, injury to surrounding structures including bladder, bowel ureters, etc., as well as the possibility of infection requiring prophylactic antibiotics.       I spent 35 minutes providing care to the patient and >50% of the time was spent counseling and conoordinating care, discussing the patient's current

## 2021-10-27 ENCOUNTER — TELEPHONE (OUTPATIENT)
Dept: GYNECOLOGIC ONCOLOGY | Age: 21
End: 2021-10-27

## 2021-10-27 NOTE — TELEPHONE ENCOUNTER
Lvm to return call. Calling to notify surgery is scheduled on 11/22/21 at 11:00am;arrive 9:00am. PAT same day.  Post op 12/3/21 at 1:00pm

## 2021-10-29 ENCOUNTER — PREP FOR PROCEDURE (OUTPATIENT)
Dept: GYNECOLOGIC ONCOLOGY | Age: 21
End: 2021-10-29

## 2021-10-29 RX ORDER — SODIUM CHLORIDE 0.9 % (FLUSH) 0.9 %
10 SYRINGE (ML) INJECTION EVERY 12 HOURS SCHEDULED
Status: CANCELLED | OUTPATIENT
Start: 2021-10-29

## 2021-10-29 RX ORDER — SODIUM CHLORIDE 9 MG/ML
INJECTION, SOLUTION INTRAVENOUS CONTINUOUS
Status: CANCELLED | OUTPATIENT
Start: 2021-10-29

## 2021-10-29 RX ORDER — SODIUM CHLORIDE 0.9 % (FLUSH) 0.9 %
10 SYRINGE (ML) INJECTION PRN
Status: CANCELLED | OUTPATIENT
Start: 2021-10-29

## 2021-10-29 RX ORDER — SODIUM CHLORIDE 9 MG/ML
25 INJECTION, SOLUTION INTRAVENOUS PRN
Status: CANCELLED | OUTPATIENT
Start: 2021-10-29

## 2021-10-29 NOTE — TELEPHONE ENCOUNTER
Pt returned call and would like to reschedule surgery until after [place]. She states she can not take any more time off work. Lvm that surgery changed has been changed to 1/3/22 at [place] at 12:10pm;arrive 10:00am.Post op is 1/14/22 at 9:30am..Asked that she return call to confirm she received this information.

## 2021-11-04 NOTE — TELEPHONE ENCOUNTER
Left  for patient letting her know time and date of surgery and post-op appt. I asked her to return call to office to let us know that she has received this information.

## 2022-01-03 ENCOUNTER — ANESTHESIA EVENT (OUTPATIENT)
Dept: OPERATING ROOM | Age: 22
End: 2022-01-03
Payer: COMMERCIAL

## 2022-01-03 ENCOUNTER — HOSPITAL ENCOUNTER (OUTPATIENT)
Age: 22
Setting detail: OUTPATIENT SURGERY
Discharge: HOME | End: 2022-01-03
Attending: OBSTETRICS & GYNECOLOGY | Admitting: OBSTETRICS & GYNECOLOGY
Payer: COMMERCIAL

## 2022-01-03 ENCOUNTER — ANESTHESIA (OUTPATIENT)
Dept: OPERATING ROOM | Age: 22
End: 2022-01-03
Payer: COMMERCIAL

## 2022-01-03 VITALS — DIASTOLIC BLOOD PRESSURE: 63 MMHG | OXYGEN SATURATION: 99 % | SYSTOLIC BLOOD PRESSURE: 106 MMHG | TEMPERATURE: 97.3 F

## 2022-01-03 VITALS
RESPIRATION RATE: 14 BRPM | TEMPERATURE: 97.2 F | HEART RATE: 68 BPM | SYSTOLIC BLOOD PRESSURE: 132 MMHG | OXYGEN SATURATION: 99 % | DIASTOLIC BLOOD PRESSURE: 74 MMHG

## 2022-01-03 DIAGNOSIS — G89.18 POST-OP PAIN: Primary | ICD-10-CM

## 2022-01-03 PROBLEM — Z98.890 S/P EXCISION OF LIPOMA: Status: ACTIVE | Noted: 2022-01-03

## 2022-01-03 PROBLEM — Z86.018 S/P EXCISION OF LIPOMA: Status: ACTIVE | Noted: 2022-01-03

## 2022-01-03 LAB
ABSOLUTE EOS #: 0.12 K/UL (ref 0–0.44)
ABSOLUTE IMMATURE GRANULOCYTE: <0.03 K/UL (ref 0–0.3)
ABSOLUTE LYMPH #: 1.8 K/UL (ref 1.1–3.7)
ABSOLUTE MONO #: 0.44 K/UL (ref 0.1–1.4)
ANION GAP SERPL CALCULATED.3IONS-SCNC: 14 MMOL/L (ref 9–17)
BASOPHILS # BLD: 1 % (ref 0–2)
BASOPHILS ABSOLUTE: 0.03 K/UL (ref 0–0.2)
BUN BLDV-MCNC: 13 MG/DL (ref 6–20)
BUN/CREAT BLD: ABNORMAL (ref 9–20)
CALCIUM SERPL-MCNC: 9.2 MG/DL (ref 8.6–10.4)
CHLORIDE BLD-SCNC: 106 MMOL/L (ref 98–107)
CO2: 19 MMOL/L (ref 20–31)
CREAT SERPL-MCNC: 0.6 MG/DL (ref 0.5–0.9)
DIFFERENTIAL TYPE: NORMAL
EOSINOPHILS RELATIVE PERCENT: 2 % (ref 1–4)
GFR AFRICAN AMERICAN: >60 ML/MIN
GFR NON-AFRICAN AMERICAN: >60 ML/MIN
GFR SERPL CREATININE-BSD FRML MDRD: ABNORMAL ML/MIN/{1.73_M2}
GFR SERPL CREATININE-BSD FRML MDRD: ABNORMAL ML/MIN/{1.73_M2}
GLUCOSE BLD-MCNC: 88 MG/DL (ref 70–99)
HCG QUALITATIVE: NEGATIVE
HCT VFR BLD CALC: 42.2 % (ref 36.3–47.1)
HEMOGLOBIN: 14.6 G/DL (ref 11.9–15.1)
IMMATURE GRANULOCYTES: 0 %
LYMPHOCYTES # BLD: 31 % (ref 25–45)
MCH RBC QN AUTO: 29.2 PG (ref 25.2–33.5)
MCHC RBC AUTO-ENTMCNC: 34.6 G/DL (ref 28.4–34.8)
MCV RBC AUTO: 84.4 FL (ref 82.6–102.9)
MONOCYTES # BLD: 8 % (ref 2–8)
NRBC AUTOMATED: 0 PER 100 WBC
PDW BLD-RTO: 12.2 % (ref 11.8–14.4)
PLATELET # BLD: 232 K/UL (ref 138–453)
PLATELET ESTIMATE: NORMAL
PMV BLD AUTO: 11.8 FL (ref 8.1–13.5)
POTASSIUM SERPL-SCNC: 4.4 MMOL/L (ref 3.7–5.3)
RBC # BLD: 5 M/UL (ref 3.95–5.11)
RBC # BLD: NORMAL 10*6/UL
SEG NEUTROPHILS: 58 % (ref 34–64)
SEGMENTED NEUTROPHILS ABSOLUTE COUNT: 3.42 K/UL (ref 1.5–8.1)
SODIUM BLD-SCNC: 139 MMOL/L (ref 135–144)
WBC # BLD: 5.8 K/UL (ref 4.5–13.5)
WBC # BLD: NORMAL 10*3/UL

## 2022-01-03 PROCEDURE — 88377 M/PHMTRC ALYS ISHQUANT/SEMIQ: CPT

## 2022-01-03 PROCEDURE — 85025 COMPLETE CBC W/AUTO DIFF WBC: CPT

## 2022-01-03 PROCEDURE — 7100000000 HC PACU RECOVERY - FIRST 15 MIN: Performed by: OBSTETRICS & GYNECOLOGY

## 2022-01-03 PROCEDURE — 2500000003 HC RX 250 WO HCPCS: Performed by: NURSE ANESTHETIST, CERTIFIED REGISTERED

## 2022-01-03 PROCEDURE — 3600000015 HC SURGERY LEVEL 5 ADDTL 15MIN: Performed by: OBSTETRICS & GYNECOLOGY

## 2022-01-03 PROCEDURE — 3600000005 HC SURGERY LEVEL 5 BASE: Performed by: OBSTETRICS & GYNECOLOGY

## 2022-01-03 PROCEDURE — 7100000040 HC SPAR PHASE II RECOVERY - FIRST 15 MIN: Performed by: OBSTETRICS & GYNECOLOGY

## 2022-01-03 PROCEDURE — 2500000003 HC RX 250 WO HCPCS: Performed by: OBSTETRICS & GYNECOLOGY

## 2022-01-03 PROCEDURE — 80048 BASIC METABOLIC PNL TOTAL CA: CPT

## 2022-01-03 PROCEDURE — 6370000000 HC RX 637 (ALT 250 FOR IP): Performed by: OBSTETRICS & GYNECOLOGY

## 2022-01-03 PROCEDURE — 2709999900 HC NON-CHARGEABLE SUPPLY: Performed by: OBSTETRICS & GYNECOLOGY

## 2022-01-03 PROCEDURE — 6370000000 HC RX 637 (ALT 250 FOR IP): Performed by: ANESTHESIOLOGY

## 2022-01-03 PROCEDURE — 88307 TISSUE EXAM BY PATHOLOGIST: CPT

## 2022-01-03 PROCEDURE — 3700000001 HC ADD 15 MINUTES (ANESTHESIA): Performed by: OBSTETRICS & GYNECOLOGY

## 2022-01-03 PROCEDURE — 56620 VULVECTOMY SIMPLE PARTIAL: CPT | Performed by: OBSTETRICS & GYNECOLOGY

## 2022-01-03 PROCEDURE — 3700000000 HC ANESTHESIA ATTENDED CARE: Performed by: OBSTETRICS & GYNECOLOGY

## 2022-01-03 PROCEDURE — 7100000001 HC PACU RECOVERY - ADDTL 15 MIN: Performed by: OBSTETRICS & GYNECOLOGY

## 2022-01-03 PROCEDURE — 2580000003 HC RX 258: Performed by: OBSTETRICS & GYNECOLOGY

## 2022-01-03 PROCEDURE — 2580000003 HC RX 258: Performed by: NURSE ANESTHETIST, CERTIFIED REGISTERED

## 2022-01-03 PROCEDURE — 84703 CHORIONIC GONADOTROPIN ASSAY: CPT

## 2022-01-03 PROCEDURE — 6360000002 HC RX W HCPCS: Performed by: NURSE ANESTHETIST, CERTIFIED REGISTERED

## 2022-01-03 RX ORDER — ACETAMINOPHEN 500 MG
1000 TABLET ORAL 3 TIMES DAILY PRN
Qty: 40 TABLET | Refills: 0 | Status: SHIPPED | OUTPATIENT
Start: 2022-01-03 | End: 2022-02-01

## 2022-01-03 RX ORDER — ONDANSETRON 2 MG/ML
INJECTION INTRAMUSCULAR; INTRAVENOUS PRN
Status: DISCONTINUED | OUTPATIENT
Start: 2022-01-03 | End: 2022-01-03 | Stop reason: SDUPTHER

## 2022-01-03 RX ORDER — SODIUM CHLORIDE, SODIUM LACTATE, POTASSIUM CHLORIDE, CALCIUM CHLORIDE 600; 310; 30; 20 MG/100ML; MG/100ML; MG/100ML; MG/100ML
INJECTION, SOLUTION INTRAVENOUS CONTINUOUS PRN
Status: DISCONTINUED | OUTPATIENT
Start: 2022-01-03 | End: 2022-01-03 | Stop reason: SDUPTHER

## 2022-01-03 RX ORDER — SODIUM CHLORIDE 0.9 % (FLUSH) 0.9 %
10 SYRINGE (ML) INJECTION EVERY 12 HOURS SCHEDULED
Status: DISCONTINUED | OUTPATIENT
Start: 2022-01-03 | End: 2022-01-03 | Stop reason: HOSPADM

## 2022-01-03 RX ORDER — FENTANYL CITRATE 50 UG/ML
25 INJECTION, SOLUTION INTRAMUSCULAR; INTRAVENOUS EVERY 5 MIN PRN
Status: DISCONTINUED | OUTPATIENT
Start: 2022-01-03 | End: 2022-01-03 | Stop reason: HOSPADM

## 2022-01-03 RX ORDER — LABETALOL HYDROCHLORIDE 5 MG/ML
5 INJECTION, SOLUTION INTRAVENOUS EVERY 10 MIN PRN
Status: DISCONTINUED | OUTPATIENT
Start: 2022-01-03 | End: 2022-01-03 | Stop reason: HOSPADM

## 2022-01-03 RX ORDER — SODIUM CHLORIDE 9 MG/ML
INJECTION, SOLUTION INTRAVENOUS CONTINUOUS
Status: DISCONTINUED | OUTPATIENT
Start: 2022-01-03 | End: 2022-01-03 | Stop reason: HOSPADM

## 2022-01-03 RX ORDER — KETOROLAC TROMETHAMINE 30 MG/ML
INJECTION, SOLUTION INTRAMUSCULAR; INTRAVENOUS PRN
Status: DISCONTINUED | OUTPATIENT
Start: 2022-01-03 | End: 2022-01-03 | Stop reason: SDUPTHER

## 2022-01-03 RX ORDER — MIDAZOLAM HYDROCHLORIDE 1 MG/ML
INJECTION INTRAMUSCULAR; INTRAVENOUS PRN
Status: DISCONTINUED | OUTPATIENT
Start: 2022-01-03 | End: 2022-01-03 | Stop reason: SDUPTHER

## 2022-01-03 RX ORDER — SODIUM CHLORIDE 9 MG/ML
25 INJECTION, SOLUTION INTRAVENOUS PRN
Status: DISCONTINUED | OUTPATIENT
Start: 2022-01-03 | End: 2022-01-03 | Stop reason: HOSPADM

## 2022-01-03 RX ORDER — BUPIVACAINE HYDROCHLORIDE AND EPINEPHRINE 2.5; 5 MG/ML; UG/ML
INJECTION, SOLUTION INFILTRATION; PERINEURAL PRN
Status: DISCONTINUED | OUTPATIENT
Start: 2022-01-03 | End: 2022-01-03 | Stop reason: ALTCHOICE

## 2022-01-03 RX ORDER — FENTANYL CITRATE 50 UG/ML
INJECTION, SOLUTION INTRAMUSCULAR; INTRAVENOUS PRN
Status: DISCONTINUED | OUTPATIENT
Start: 2022-01-03 | End: 2022-01-03 | Stop reason: SDUPTHER

## 2022-01-03 RX ORDER — LIDOCAINE HYDROCHLORIDE 10 MG/ML
INJECTION, SOLUTION EPIDURAL; INFILTRATION; INTRACAUDAL; PERINEURAL PRN
Status: DISCONTINUED | OUTPATIENT
Start: 2022-01-03 | End: 2022-01-03 | Stop reason: SDUPTHER

## 2022-01-03 RX ORDER — DEXAMETHASONE SODIUM PHOSPHATE 10 MG/ML
INJECTION INTRAMUSCULAR; INTRAVENOUS PRN
Status: DISCONTINUED | OUTPATIENT
Start: 2022-01-03 | End: 2022-01-03 | Stop reason: SDUPTHER

## 2022-01-03 RX ORDER — PROPOFOL 10 MG/ML
INJECTION, EMULSION INTRAVENOUS PRN
Status: DISCONTINUED | OUTPATIENT
Start: 2022-01-03 | End: 2022-01-03 | Stop reason: SDUPTHER

## 2022-01-03 RX ORDER — ACETAMINOPHEN 650 MG
TABLET, EXTENDED RELEASE ORAL
Qty: 30 ML | Refills: 1 | Status: SHIPPED | OUTPATIENT
Start: 2022-01-03 | End: 2022-01-10

## 2022-01-03 RX ORDER — MORPHINE SULFATE 2 MG/ML
2 INJECTION, SOLUTION INTRAMUSCULAR; INTRAVENOUS EVERY 5 MIN PRN
Status: DISCONTINUED | OUTPATIENT
Start: 2022-01-03 | End: 2022-01-03 | Stop reason: HOSPADM

## 2022-01-03 RX ORDER — CLINDAMYCIN PHOSPHATE 20 MG/G
CREAM VAGINAL PRN
Status: DISCONTINUED | OUTPATIENT
Start: 2022-01-03 | End: 2022-01-03 | Stop reason: ALTCHOICE

## 2022-01-03 RX ORDER — ONDANSETRON 2 MG/ML
4 INJECTION INTRAMUSCULAR; INTRAVENOUS
Status: DISCONTINUED | OUTPATIENT
Start: 2022-01-03 | End: 2022-01-03 | Stop reason: HOSPADM

## 2022-01-03 RX ORDER — DIPHENHYDRAMINE HYDROCHLORIDE 50 MG/ML
12.5 INJECTION INTRAMUSCULAR; INTRAVENOUS
Status: DISCONTINUED | OUTPATIENT
Start: 2022-01-03 | End: 2022-01-03 | Stop reason: HOSPADM

## 2022-01-03 RX ORDER — OXYCODONE HYDROCHLORIDE AND ACETAMINOPHEN 5; 325 MG/1; MG/1
1 TABLET ORAL PRN
Status: COMPLETED | OUTPATIENT
Start: 2022-01-03 | End: 2022-01-03

## 2022-01-03 RX ORDER — OXYCODONE HYDROCHLORIDE AND ACETAMINOPHEN 5; 325 MG/1; MG/1
2 TABLET ORAL PRN
Status: COMPLETED | OUTPATIENT
Start: 2022-01-03 | End: 2022-01-03

## 2022-01-03 RX ORDER — MAGNESIUM HYDROXIDE 1200 MG/15ML
LIQUID ORAL CONTINUOUS PRN
Status: COMPLETED | OUTPATIENT
Start: 2022-01-03 | End: 2022-01-03

## 2022-01-03 RX ORDER — IBUPROFEN 600 MG/1
600 TABLET ORAL EVERY 6 HOURS PRN
Qty: 40 TABLET | Refills: 1 | Status: SHIPPED | OUTPATIENT
Start: 2022-01-03 | End: 2022-02-01

## 2022-01-03 RX ORDER — HYDROCODONE BITARTRATE AND ACETAMINOPHEN 5; 325 MG/1; MG/1
1 TABLET ORAL EVERY 6 HOURS PRN
Qty: 5 TABLET | Refills: 0 | Status: SHIPPED | OUTPATIENT
Start: 2022-01-03 | End: 2022-01-05

## 2022-01-03 RX ORDER — SODIUM CHLORIDE 0.9 % (FLUSH) 0.9 %
10 SYRINGE (ML) INJECTION PRN
Status: DISCONTINUED | OUTPATIENT
Start: 2022-01-03 | End: 2022-01-03 | Stop reason: HOSPADM

## 2022-01-03 RX ADMIN — ONDANSETRON 4 MG: 2 INJECTION INTRAMUSCULAR; INTRAVENOUS at 12:31

## 2022-01-03 RX ADMIN — KETOROLAC TROMETHAMINE 30 MG: 30 INJECTION, SOLUTION INTRAMUSCULAR at 12:36

## 2022-01-03 RX ADMIN — OXYCODONE HYDROCHLORIDE AND ACETAMINOPHEN 1 TABLET: 5; 325 TABLET ORAL at 13:41

## 2022-01-03 RX ADMIN — LIDOCAINE HYDROCHLORIDE 50 MG: 10 INJECTION, SOLUTION EPIDURAL; INFILTRATION; INTRACAUDAL; PERINEURAL at 11:30

## 2022-01-03 RX ADMIN — FENTANYL CITRATE 50 MCG: 50 INJECTION, SOLUTION INTRAMUSCULAR; INTRAVENOUS at 11:46

## 2022-01-03 RX ADMIN — PROPOFOL 200 MG: 10 INJECTION, EMULSION INTRAVENOUS at 11:30

## 2022-01-03 RX ADMIN — MIDAZOLAM HYDROCHLORIDE 2 MG: 1 INJECTION, SOLUTION INTRAMUSCULAR; INTRAVENOUS at 11:25

## 2022-01-03 RX ADMIN — DEXAMETHASONE SODIUM PHOSPHATE 10 MG: 10 INJECTION INTRAMUSCULAR; INTRAVENOUS at 11:34

## 2022-01-03 RX ADMIN — FENTANYL CITRATE 25 MCG: 50 INJECTION, SOLUTION INTRAMUSCULAR; INTRAVENOUS at 12:32

## 2022-01-03 RX ADMIN — SODIUM CHLORIDE, POTASSIUM CHLORIDE, SODIUM LACTATE AND CALCIUM CHLORIDE: 600; 310; 30; 20 INJECTION, SOLUTION INTRAVENOUS at 11:25

## 2022-01-03 RX ADMIN — FENTANYL CITRATE 25 MCG: 50 INJECTION, SOLUTION INTRAMUSCULAR; INTRAVENOUS at 12:34

## 2022-01-03 RX ADMIN — FENTANYL CITRATE 50 MCG: 50 INJECTION, SOLUTION INTRAMUSCULAR; INTRAVENOUS at 11:40

## 2022-01-03 ASSESSMENT — PULMONARY FUNCTION TESTS
PIF_VALUE: 14
PIF_VALUE: 14
PIF_VALUE: 13
PIF_VALUE: 16
PIF_VALUE: 9
PIF_VALUE: 2
PIF_VALUE: 7
PIF_VALUE: 0
PIF_VALUE: 14
PIF_VALUE: 6
PIF_VALUE: 9
PIF_VALUE: 8
PIF_VALUE: 13
PIF_VALUE: 14
PIF_VALUE: 9
PIF_VALUE: 14
PIF_VALUE: 14
PIF_VALUE: 4
PIF_VALUE: 14
PIF_VALUE: 14
PIF_VALUE: 1
PIF_VALUE: 10
PIF_VALUE: 14
PIF_VALUE: 12
PIF_VALUE: 9
PIF_VALUE: 11
PIF_VALUE: 8
PIF_VALUE: 14
PIF_VALUE: 12
PIF_VALUE: 14
PIF_VALUE: 14
PIF_VALUE: 8
PIF_VALUE: 13
PIF_VALUE: 12
PIF_VALUE: 14
PIF_VALUE: 16
PIF_VALUE: 14
PIF_VALUE: 14
PIF_VALUE: 13
PIF_VALUE: 8
PIF_VALUE: 14
PIF_VALUE: 9
PIF_VALUE: 4
PIF_VALUE: 13
PIF_VALUE: 14
PIF_VALUE: 0
PIF_VALUE: 14
PIF_VALUE: 15
PIF_VALUE: 15
PIF_VALUE: 14
PIF_VALUE: 12
PIF_VALUE: 1
PIF_VALUE: 9
PIF_VALUE: 12
PIF_VALUE: 12
PIF_VALUE: 13
PIF_VALUE: 1
PIF_VALUE: 4
PIF_VALUE: 16
PIF_VALUE: 4
PIF_VALUE: 14
PIF_VALUE: 14
PIF_VALUE: 9
PIF_VALUE: 13
PIF_VALUE: 14
PIF_VALUE: 14
PIF_VALUE: 16
PIF_VALUE: 14
PIF_VALUE: 13
PIF_VALUE: 9
PIF_VALUE: 14
PIF_VALUE: 9
PIF_VALUE: 16
PIF_VALUE: 14
PIF_VALUE: 4
PIF_VALUE: 13
PIF_VALUE: 12
PIF_VALUE: 14

## 2022-01-03 ASSESSMENT — PAIN SCALES - GENERAL
PAINLEVEL_OUTOF10: 0
PAINLEVEL_OUTOF10: 3
PAINLEVEL_OUTOF10: 3
PAINLEVEL_OUTOF10: 0
PAINLEVEL_OUTOF10: 3

## 2022-01-03 NOTE — ANESTHESIA PRE PROCEDURE
Readings from Last 3 Encounters:   01/03/22 122/65   10/21/21 135/86   10/19/21 122/79       NPO Status:  MN                                                                               BMI:   Wt Readings from Last 3 Encounters:   10/21/21 232 lb 9.6 oz (105.5 kg)   10/19/21 233 lb (105.7 kg)   10/08/21 230 lb (104.3 kg)     There is no height or weight on file to calculate BMI.    CBC:   Lab Results   Component Value Date    WBC 10.4 10/01/2021    RBC 4.80 10/01/2021    HGB 13.6 10/01/2021    HCT 42.5 10/01/2021    MCV 88.5 10/01/2021    RDW 12.7 10/01/2021     10/01/2021       CMP:   Lab Results   Component Value Date     10/01/2021    K 4.6 10/01/2021     10/01/2021    CO2 16 10/01/2021    BUN 5 10/01/2021    CREATININE 0.42 10/01/2021    GFRAA >60 10/01/2021    LABGLOM >60 10/01/2021    GLUCOSE 128 10/01/2021    PROT 5.7 09/30/2021    CALCIUM 9.0 10/01/2021    BILITOT 3.18 09/30/2021    ALKPHOS 142 09/30/2021     09/30/2021     09/30/2021       POC Tests: No results for input(s): POCGLU, POCNA, POCK, POCCL, POCBUN, POCHEMO, POCHCT in the last 72 hours.     Coags: No results found for: PROTIME, INR, APTT    HCG (If Applicable):   Lab Results   Component Value Date    HCGQUANT 27,408 (H) 11/03/2020        ABGs: No results found for: PHART, PO2ART, AQF4OBZ, NPS5JSR, BEART, U4WEMLVZ     Type & Screen (If Applicable):  No results found for: LABABO, LABRH    Drug/Infectious Status (If Applicable):  No results found for: HIV, HEPCAB    COVID-19 Screening (If Applicable):   Lab Results   Component Value Date    COVID19 Not Detected 09/29/2021           Anesthesia Evaluation  Patient summary reviewed no history of anesthetic complications:   Airway: Mallampati: II  TM distance: >3 FB   Neck ROM: full  Mouth opening: > = 3 FB Dental:          Pulmonary:Negative Pulmonary ROS and normal exam        (-) recent URI                           Cardiovascular:Negative CV ROS  Exercise tolerance: good (>4 METS),           Rhythm: regular  Rate: normal                    Neuro/Psych:   Negative Neuro/Psych ROS     (-) seizures           GI/Hepatic/Renal: Neg GI/Hepatic/Renal ROS       (-) GERDMorbid obesity: obesity. Endo/Other: Negative Endo/Other ROS       (-) diabetes mellitus               Abdominal:   (+) obese,           Vascular: negative vascular ROS.          Other Findings:                     Wilma Weaver MD   1/3/2022

## 2022-01-03 NOTE — ANESTHESIA PRE PROCEDURE
Department of Anesthesiology  Preprocedure Note       Name:  Genny Franco   Age:  24 y.o.  :  2000                                          MRN:  [id number]         Date:  1/3/2022      Surgeon: Tamie Childers):  Cira Hernandez MD    Procedure: Procedure(s):  EXCISION LIPOMA LABIA MAJORA    Medications prior to admission:   Prior to Admission medications    Medication Sig Start Date End Date Taking? Authorizing Provider   ibuprofen (ADVIL;MOTRIN) 600 MG tablet Take 1 tablet by mouth every 6 hours as needed for Pain 1/3/22   Ani Panda, DO   acetaminophen (TYLENOL) 500 MG tablet Take 2 tablets by mouth 3 times daily as needed for Pain 1/3/22   Ani Panda, DO   HYDROcodone-acetaminophen (NORCO) 5-325 MG per tablet Take 1 tablet by mouth every 6 hours as needed for Pain for up to 5 doses. Intended supply: 5 doses. Take lowest dose possible to manage pain 1/3/22 1/5/22  Ani Panda, DO   povidone-iodine (BETADINE) 10 % external solution Mix 1 tbsp of betadine with 4 tbsp of water for each rinse and rinse twice daily and after bowel movements. Pat dry or blow dryer on low and then apply clindamycin cream. 1/3/22 1/10/22  Yulia Hernandez DO       Current medications:    No current facility-administered medications for this visit. Current Outpatient Medications   Medication Sig Dispense Refill    ibuprofen (ADVIL;MOTRIN) 600 MG tablet Take 1 tablet by mouth every 6 hours as needed for Pain 40 tablet 1    acetaminophen (TYLENOL) 500 MG tablet Take 2 tablets by mouth 3 times daily as needed for Pain 40 tablet 0    HYDROcodone-acetaminophen (NORCO) 5-325 MG per tablet Take 1 tablet by mouth every 6 hours as needed for Pain for up to 5 doses. Intended supply: 5 doses. Take lowest dose possible to manage pain 5 tablet 0    povidone-iodine (BETADINE) 10 % external solution Mix 1 tbsp of betadine with 4 tbsp of water for each rinse and rinse twice daily and after bowel movements.  Pat dry or blow dryer on low and then apply clindamycin cream. 30 mL 1     Facility-Administered Medications Ordered in Other Visits   Medication Dose Route Frequency Provider Last Rate Last Admin    0.9 % sodium chloride infusion   IntraVENous Continuous Jennifer Dueñas PA-C        0.9 % sodium chloride infusion  25 mL IntraVENous PRN Jennifersa Spenser PA-C        sodium chloride flush 0.9 % injection 10 mL  10 [facility]        sodium chloride flush 0.9 % injection 10 mL  10 mL IntraVENous PRN Jennifer Dueñas PA-C        fentaNYL (SUBLIMAZE) injection 25 mcg  25 mcg IntraVENous Q5 Min PRN Vicky Mcpherson MD        morphine (PF) injection 2 mg  2 mg IntraVENous Q5 Min PRN Vicky Mcpherson MD        [facility] injection 4 mg  4 mg IntraVENous Once PRN Vicky Mcpherson MD        diphenhydrAMINE (BENADRYL) injection 12.5 mg  12.5 mg IntraVENous Once PRN Vicky Mcpherson MD        labetalol (NORMODYNE;TRANDATE) injection 5 mg  5 mg IntraVENous Q10 Min PRN Vicky Mcpherson MD        sodium chloride 0.9 % irrigation    Continuous PRN Maryann Riggins MD   1,000 mL at 22 1145    bupivacaine-EPINEPHrine (MARCAINE-w/EPINEPHRINE) 0.25% -[id number] injection    PRN Maryann Riggins MD   10 mL at 22 1151    clindamycin (CLEOCIN) 2 % vaginal cream    PRN Maryann Riggins MD   1 mL at 22 1240    povidone-iodine (BETADINE) 10 % 15 mL in sodium chloride 0.9 % 500 mL irrigation    Continuous PRN Maryann Riggins MD   300 mL at 22 1233       Allergies:  No Known Allergies    Problem List:    Patient Active Problem List   Diagnosis Code    Lipoma of Vulva/Right Labia D17.30    ASA 81 mg/dy per MFM R89.9    Positive Coxsackie B virus serologies R89.9     21 F APG 7/9 Wt 6#3 O80    Cholecystitis K81.9    Right labial lipoma excision 1/3/22 Z98.890, Z86.018    Post-op pain G89.18    Vulvar lesion N90.89       Past Medical History:        Diagnosis Date    No pertinent past medical history        Past Surgical History:        Procedure Laterality Date    CHOLECYSTECTOMY, LAPAROSCOPIC N/A 9/30/2021    LAPAROSCOPIC CHOLECYSTECTOMY performed by Rylie Childs MD at [address]  01/03/2022    EXCISION LIPOMA LABIA MAJORA    WISDOM TOOTH EXTRACTION         Social History:    Social History     Tobacco Use    Smoking status: Never Smoker    Smokeless tobacco: Never Used   Substance Use Topics    Alcohol use: Not Currently                                Counseling given: Not Answered      Vital Signs (Current): There were no vitals filed for this visit. BP Readings from Last 3 Encounters:   01/03/22 125/68   01/03/22 106/63   10/21/21 135/86       NPO Status:  MN                                                                               BMI:   Wt Readings from Last 3 Encounters:   10/21/21 232 lb 9.6 oz (105.5 kg)   10/19/21 233 lb (105.7 kg)   10/08/21 230 lb (104.3 kg)     There is no height or weight on file to calculate BMI.    CBC:   Lab Results   Component Value Date    WBC 5.8 01/03/2022    RBC 5.00 01/03/2022    HGB 14.6 01/03/2022    HCT 42.2 01/03/2022    MCV 84.4 01/03/2022    RDW 12.2 01/03/2022     01/03/2022       CMP:   Lab Results   Component Value Date     01/03/2022    K 4.4 01/03/2022     01/03/2022    CO2 19 01/03/2022    BUN 13 01/03/2022    CREATININE 0.60 01/03/2022    GFRAA >60 01/03/2022    LABGLOM >60 01/03/2022    GLUCOSE 88 01/03/2022    PROT 5.7 09/30/2021    CALCIUM 9.2 01/03/2022    BILITOT 3.18 09/30/2021    ALKPHOS 142 09/30/2021     09/30/2021     09/30/2021       POC Tests: No results for input(s): POCGLU, POCNA, POCK, POCCL, POCBUN, POCHEMO, POCHCT in the last 72 hours.     Coags: No results found for: PROTIME, INR, APTT    HCG (If Applicable):   Lab Results   Component Value Date    HCGQUANT 43,512 (H) 11/03/2020        ABGs: No results found for: PHART, PO2ART, MJN1OFN, TDG2GLD, BEART, G1FORUQF     Type & Screen (If Applicable):  No results found for: LABABO, LABRH    Drug/Infectious Status (If Applicable):  No results found for: HIV, HEPCAB    COVID-19 Screening (If Applicable):   Lab Results   Component Value Date    COVID19 Not Detected 09/29/2021           Anesthesia Evaluation  Patient summary reviewed no history of anesthetic complications:   Airway: Mallampati: II  TM distance: >3 FB   Neck ROM: full  Mouth opening: > = 3 FB Dental:          Pulmonary:Negative Pulmonary ROS and normal exam        (-) recent URI                           Cardiovascular:Negative CV ROS  Exercise tolerance: good (>4 METS),           Rhythm: regular  Rate: normal                    Neuro/Psych:   Negative Neuro/Psych ROS     (-) seizures           GI/Hepatic/Renal: Neg GI/Hepatic/Renal ROS       (-) GERDMorbid obesity: obesity. Endo/Other: Negative Endo/Other ROS       (-) diabetes mellitus               Abdominal:   (+) obese,           Vascular: negative vascular ROS. Other Findings:                     Judge Patricia MD   1/3/2022          Anesthesia Plan      general     ASA 3       Induction: intravenous.

## 2022-01-03 NOTE — H&P
OB/GYN Pre-Op H&P  [facility]    Patient Name: Bea Fagan     Patient : 2000  [facility]  Admission Date/Time: 1/3/2022 10:04 AM  Primary Care Physician: GORDON Merritt CNP  MRN: [id number]    Date: 1/3/2022  Time: 10:35 AM    The patient was seen in pre-op holding. She is here for Excision of R labial lipomatis mass. H&P obtained from visit with Dr. Mariella Brown 10/21/22:   \"[id number] Complaint: Lipoma of vulva     HPI  She is a 24 y.o.  1 para 3 female who is being referred for a vulvar lipoma. The patient states that she noticed swelling in the right vulva about 2 years ago. It was not bothering her. She stated that it was noticed by her OB/GYN when she came in for obstetrical care. She states that she does not believe it has grown in size. The patient did have a vaginal childbirth in May. She is currently breast-feeding. She states that the swelling from the right vulvar lipoma has never bothered her. She states that she does wish to have it removed, however. She recently had a cholecystectomy for acute cholecystitis. Prior to the surgery an abdominal pelvic CT scan and MRI were performed. The CT scan covered the pelvis and noted the \"right vulvar lipoma \". No other pathology was noted. Patient states that she has no other medical problems. She is on no medications. \"    The procedure risks and complications were reviewed. The labs, Consent, and H&P were reviewed and updated. The patient was counseled on the possibility of  the need of a second surgery. The patient voiced understanding and had all of her questions answered. The possibility of incomplete removal of abnormal tissue was discussed.     OBSTETRICAL HISTORY:   OB History    Para Term  AB Living   1 1 1 0 0 1   SAB IAB Ectopic Molar Multiple Live Births   0 0 0 0 0 1      # Outcome Date GA Lbr John/2nd Weight Sex Delivery Anes PTL Lv   1 distended, no rebound or guarding, no hernias, no hepatomegaly, no splenomegly  MUSCULOSKELETAL:  Equal strength bilaterally, normal muscle tone  SKIN: No abscess or rash  NEUROLOGIC:  Cranial nerves 2-12 grossly intact, no focal deficits  PSYCH: affect appropriate  Pelvic Exam: deferred to OR      LAB RESULTS:  No visits with results within 4 Week(s) from this visit. Latest known visit with results is:   Admission on 09/28/2021, Discharged on 10/01/2021   Component Date Value Ref Range Status    Lipase 09/28/2021 29  13 - 60 U/L Final    Glucose 09/28/2021 118* 70 - 99 mg/dL Final    BUN 09/28/2021 9  6 - 20 mg/dL Final    CREATININE 09/28/2021 0.60  0.50 - 0.90 mg/dL Final    ICTERIC SPECIMEN    Bun/Cre Ratio 09/28/2021 NOT REPORTED  9 - 20 Final    Calcium 09/28/2021 9.5  8.6 - 10.4 mg/dL Final    Sodium 09/28/2021 139  135 - 144 mmol/L Final    Potassium 09/28/2021 3.9  3.7 - 5.3 mmol/L Final    Chloride 09/28/2021 104  98 - 107 mmol/L Final    CO2 09/28/2021 21  20 - 31 mmol/L Final    Anion Gap 09/28/2021 14  9 - 17 mmol/L Final    Alkaline Phosphatase 09/28/2021 166* 35 - 104 U/L Final    ALT 09/28/2021 720* 5 - 33 U/L Final    AST 09/28/2021 576* <32 U/L Final    Total Bilirubin 09/28/2021 3.71* 0.3 - 1.2 mg/dL Final    Total Protein 09/28/2021 7.1  6.4 - 8.3 g/dL Final    Albumin 09/28/2021 4.8  3.5 - 5.2 g/dL Final    Albumin/Globulin Ratio 09/28/2021 2.1  1.0 - 2.5 Final    GFR Non- 09/28/2021 >60  >60 mL/min Final    GFR  09/28/2021 >60  >60 mL/min Final    GFR Comment 09/28/2021        Final    Comment: Average GFR for 20-28 years old:   116 mL/min/1.73sq m  Chronic Kidney Disease:   <60 mL/min/1.73sq m  Kidney failure:   <15 mL/min/1.73sq m              eGFR calculated using average adult body mass.  Additional eGFR calculator available at:        [employer]            GFR Staging 09/28/2021 NOT REPORTED   Final    WBC 09/28/2021 6.5 4.5 - 13.5 k/uL Final    RBC 09/28/2021 4.85  3.95 - 5.11 m/uL Final    Hemoglobin 09/28/2021 13.9  11.9 - 15.1 g/dL Final    Hematocrit 09/28/2021 41.2  36.3 - 47.1 % Final    MCV 09/28/2021 84.9  82.6 - 102.9 fL Final    MCH 09/28/2021 28.7  25.2 - 33.5 pg Final    MCHC 09/28/2021 33.7  28.4 - 34.8 g/dL Final    RDW 09/28/2021 12.4  11.8 - 14.4 % Final    Platelets 67/36/3700 242  138 - 453 k/uL Final    MPV 09/28/2021 11.6  8.1 - 13.5 fL Final    NRBC Automated 09/28/2021 0.0  0.0 per 100 WBC Final    Differential Type 09/28/2021 NOT REPORTED   Final    WBC Morphology 09/28/2021 NOT REPORTED   Final    RBC Morphology 09/28/2021 NOT REPORTED   Final    Platelet Estimate 52/15/3878 NOT REPORTED   Final    Immature Granulocytes 09/28/2021 0  0 % Final    Seg Neutrophils 09/28/2021 86* 34 - 64 % Final    Lymphocytes 09/28/2021 8* 25 - 45 % Final    Monocytes 09/28/2021 5  2 - 8 % Final    Eosinophils % 09/28/2021 0* 1 - 4 % Final    Basophils 09/28/2021 1  0 - 2 % Final    Absolute Immature Granulocyte 09/28/2021 0.00  0.00 - 0.30 k/uL Final    Segs Absolute 09/28/2021 5.58  1.8 - 7.7 k/uL Final    Absolute Lymph # 09/28/2021 0.52* 1.0 - 4.8 k/uL Final    Absolute Mono # 09/28/2021 0.33  0.1 - 1.4 k/uL Final    Absolute Eos # 09/28/2021 0.00  0.0 - 0.4 k/uL Final    Basophils Absolute 09/28/2021 0.07  0.0 - 0.2 k/uL Final    Morphology 09/28/2021 Normal   Final    hCG Qual 09/28/2021 NEGATIVE  NEGATIVE Final    Comment: Specimens with hCG levels near the threshold of the test (25 mIU/mL) may give a negative or   indeterminate result. In such cases, another test should be performed with a new specimen   in 48-72 hours. If early pregnancy is suspected clinically in this setting, correlation   with quantitative serum b-hCG level is suggested. [employer] has confirmed the use of plasma for this test. This has not been cleared   or approved by the U.S. Food and Drug Administration.   The FDA has determined that such   clearance is not necessary. Glucose 09/29/2021 102* 70 - 99 mg/dL Final    BUN 09/29/2021 8  6 - 20 mg/dL Final    CREATININE 09/29/2021 0.57  0.50 - 0.90 mg/dL Final    ICTERIC SPECIMEN    Bun/Cre Ratio 09/29/2021 NOT REPORTED  9 - 20 Final    Calcium 09/29/2021 9.2  8.6 - 10.4 mg/dL Final    Sodium 09/29/2021 140  135 - 144 mmol/L Final    Potassium 09/29/2021 3.9  3.7 - 5.3 mmol/L Final    Chloride 09/29/2021 106  98 - 107 mmol/L Final    CO2 09/29/2021 22  20 - 31 mmol/L Final    Anion Gap 09/29/2021 12  9 - 17 mmol/L Final    GFR Non- 09/29/2021 >60  >60 mL/min Final    GFR  09/29/2021 >60  >60 mL/min Final    GFR Comment 09/29/2021        Final    Comment: Average GFR for 20-28 years old:   116 mL/min/1.73sq m  Chronic Kidney Disease:   <60 mL/min/1.73sq m  Kidney failure:   <15 mL/min/1.73sq m              eGFR calculated using average adult body mass.  Additional eGFR calculator available at:        [employer]            GFR Staging 09/29/2021 NOT REPORTED   Final    Albumin 09/29/2021 4.3  3.5 - 5.2 g/dL Final    Alkaline Phosphatase 09/29/2021 160* 35 - 104 U/L Final    ALT 09/29/2021 627* 5 - 33 U/L Final    AST 09/29/2021 393* <32 U/L Final    Total Bilirubin 09/29/2021 3.73* 0.3 - 1.2 mg/dL Final    Bilirubin, Direct 09/29/2021 3.14* <0.31 mg/dL Final    Bilirubin, Indirect 09/29/2021 0.59  0.00 - 1.00 mg/dL Final    Total Protein 09/29/2021 6.2* 6.4 - 8.3 g/dL Final    Globulin 09/29/2021 NOT REPORTED  1.5 - 3.8 g/dL Final    Albumin/Globulin Ratio 09/29/2021 2.3  1.0 - 2.5 Final    Lipase 09/29/2021 21  13 - 60 U/L Final    WBC 09/29/2021 5.2  4.5 - 13.5 k/uL Final    RBC 09/29/2021 4.74  3.95 - 5.11 m/uL Final    Hemoglobin 09/29/2021 13.4  11.9 - 15.1 g/dL Final    Hematocrit 09/29/2021 40.7  36.3 - 47.1 % Final    MCV 09/29/2021 85.9  82.6 - 102.9 fL Final    MCH 09/29/2021 28.3  25.2 - 33.5 pg Final MCHC 09/29/2021 32.9  28.4 - 34.8 g/dL Final    RDW 09/29/2021 12.4  11.8 - 14.4 % Final    Platelets 55/10/2640 213  138 - 453 k/uL Final    MPV 09/29/2021 12.0  8.1 - 13.5 fL Final    NRBC Automated 09/29/2021 0.0  0.0 per 100 WBC Final    Differential Type 09/29/2021 NOT REPORTED   Final    WBC Morphology 09/29/2021 NOT REPORTED   Final    RBC Morphology 09/29/2021 NOT REPORTED   Final    Platelet Estimate 30/91/4824 NOT REPORTED   Final    Seg Neutrophils 09/29/2021 61  34 - 64 % Final    Lymphocytes 09/29/2021 24* 25 - 45 % Final    Monocytes 09/29/2021 10* 2 - 8 % Final    Eosinophils % 09/29/2021 4  1 - 4 % Final    Basophils 09/29/2021 1  0 - 2 % Final    Immature Granulocytes 09/29/2021 0  0 % Final    Segs Absolute 09/29/2021 3.20  1.50 - 8.10 k/uL Final    Absolute Lymph # 09/29/2021 1.24  1.10 - 3.70 k/uL Final    Absolute Mono # 09/29/2021 0.52  0.10 - 1.40 k/uL Final    Absolute Eos # 09/29/2021 0.21  0.00 - 0.44 k/uL Final    Basophils Absolute 09/29/2021 0.04  0.00 - 0.20 k/uL Final    Absolute Immature Granulocyte 09/29/2021 <0.03  0.00 - 0.30 k/uL Final    Specimen Description 09/29/2021 . NASOPHARYNGEAL SWAB   Final    SARS-CoV-2, Rapid 09/29/2021 Not Detected  Not Detected Final    Comment:       Rapid NAAT:  The specimen is NEGATIVE for SARS-CoV-2, the novel coronavirus associated with   COVID-19. The ID NOW COVID-19 assay is designed to detect the virus that causes COVID-19 in patients   with signs and symptoms of infection who are suspected of COVID-19. An individual without symptoms of COVID-19 and who is not shedding SARS-CoV-2 virus would   expect to have a negative (not detected) result in this assay. Negative results should be treated as presumptive and, if inconsistent with clinical signs   and symptoms or necessary for patient management,  should be tested with an alternative molecular assay.  Negative results do not preclude   SARS-CoV-2 infection and   should not be used as the sole basis for patient management decisions. Fact sheet for Healthcare Providers: Marcus.es  Fact sheet for Patients: BuildHer.es          Methodology: Isothermal Nucleic Acid Amplification      Albumin 09/30/2021 3.8  3.5 - 5.2 g/dL Final    Albumin/Globulin Ratio 09/30/2021 2.0  1.0 - 2.5 Final    Alkaline Phosphatase 09/30/2021 142* 35 - 104 U/L Final    ALT 09/30/2021 411* 5 - 33 U/L Final    AST 09/30/2021 138* <32 U/L Final    Total Bilirubin 09/30/2021 3.18* 0.3 - 1.2 mg/dL Final    Bilirubin, Direct 09/30/2021 2.90* <0.31 mg/dL Final    Bilirubin, Indirect 09/30/2021 0.28  0.00 - 1.00 mg/dL Final    BUN 09/30/2021 5* 6 - 20 mg/dL Final    Calcium 09/30/2021 9.1  8.6 - 10.4 mg/dL Final    CREATININE 09/30/2021 0.61  0.50 - 0.90 mg/dL Final    ICTERIC SPECIMEN    Glucose 09/30/2021 120* 70 - 99 mg/dL Final    Total Protein 09/30/2021 5.7* 6.4 - 8.3 g/dL Final    Sodium 09/30/2021 139  135 - 144 mmol/L Final    Potassium 09/30/2021 3.6* 3.7 - 5.3 mmol/L Final    Chloride 09/30/2021 108* 98 - 107 mmol/L Final    CO2 09/30/2021 18* 20 - 31 mmol/L Final    Anion Gap 09/30/2021 13  9 - 17 mmol/L Final    GFR Non- 09/30/2021 >60  >60 mL/min Final    GFR  09/30/2021 >60  >60 mL/min Final    GFR Comment 09/30/2021        Final    Comment: Average GFR for 20-28 years old:   116 mL/min/1.73sq m  Chronic Kidney Disease:   <60 mL/min/1.73sq m  Kidney failure:   <15 mL/min/1.73sq m              eGFR calculated using average adult body mass.  Additional eGFR calculator available at:        [employer]            GFR Staging 09/30/2021 NOT REPORTED   Final    WBC 09/30/2021 4.4* 4.5 - 13.5 k/uL Final    RBC 09/30/2021 4.38  3.95 - 5.11 m/uL Final    Hemoglobin 09/30/2021 12.6  11.9 - 15.1 g/dL Final    Hematocrit 09/30/2021 38.9  36.3 - 47.1 % Final    MCV 09/30/2021 88.8  82.6 - 102.9 fL Final    MCH 09/30/2021 28.8  25.2 - 33.5 pg Final    MCHC 09/30/2021 32.4  28.4 - 34.8 g/dL Final    RDW 09/30/2021 12.7  11.8 - 14.4 % Final    Platelets 52/81/2046 189  138 - 453 k/uL Final    MPV 09/30/2021 11.9  8.1 - 13.5 fL Final    NRBC Automated 09/30/2021 0.0  0.0 per 100 WBC Final    Differential Type 09/30/2021 NOT REPORTED   Final    Seg Neutrophils 09/30/2021 58  34 - 64 % Final    Lymphocytes 09/30/2021 26  25 - 45 % Final    Monocytes 09/30/2021 11* 2 - 8 % Final    Eosinophils % 09/30/2021 4  1 - 4 % Final    Basophils 09/30/2021 0  0 - 2 % Final    Immature Granulocytes 09/30/2021 1* 0 % Final    Segs Absolute 09/30/2021 2.55  1.50 - 8.10 k/uL Final    Absolute Lymph # 09/30/2021 1.14  1.10 - 3.70 k/uL Final    Absolute Mono # 09/30/2021 0.50  0.10 - 1.40 k/uL Final    Absolute Eos # 09/30/2021 0.17  0.00 - 0.44 k/uL Final    Basophils Absolute 09/30/2021 <0.03  0.00 - 0.20 k/uL Final    Absolute Immature Granulocyte 09/30/2021 <0.03  0.00 - 0.30 k/uL Final    WBC Morphology 09/30/2021 NOT REPORTED   Final    RBC Morphology 09/30/2021 NOT REPORTED   Final    Platelet Estimate 47/85/8034 NOT REPORTED   Final    WBC 10/01/2021 10.4  4.5 - 13.5 k/uL Final    RBC 10/01/2021 4.80  3.95 - 5.11 m/uL Final    Hemoglobin 10/01/2021 13.6  11.9 - 15.1 g/dL Final    Hematocrit 10/01/2021 42.5  36.3 - 47.1 % Final    MCV 10/01/2021 88.5  82.6 - 102.9 fL Final    MCH 10/01/2021 28.3  25.2 - 33.5 pg Final    MCHC 10/01/2021 32.0  28.4 - 34.8 g/dL Final    RDW 10/01/2021 12.7  11.8 - 14.4 % Final    Platelets 46/70/0036 224  138 - 453 k/uL Final    MPV 10/01/2021 11.8  8.1 - 13.5 fL Final    NRBC Automated 10/01/2021 0.0  0.0 per 100 WBC Final    Differential Type 10/01/2021 NOT REPORTED   Final    WBC Morphology 10/01/2021 NOT REPORTED   Final    RBC Morphology 10/01/2021 NOT REPORTED   Final    Platelet Estimate 65/09/5599 NOT REPORTED   Final    Seg Neutrophils 10/01/2021 84* 34 - 64 % Final    Lymphocytes 10/01/2021 9* 25 - 45 % Final    Monocytes 10/01/2021 6  2 - 8 % Final    Eosinophils % 10/01/2021 0* 1 - 4 % Final    Basophils 10/01/2021 0  0 - 2 % Final    Immature Granulocytes 10/01/2021 1* 0 % Final    Segs Absolute 10/01/2021 8.71* 1.50 - 8.10 k/uL Final    Absolute Lymph # 10/01/2021 0.96* 1.10 - 3.70 k/uL Final    Absolute Mono # 10/01/2021 0.62  0.10 - 1.40 k/uL Final    Absolute Eos # 10/01/2021 <0.03  0.00 - 0.44 k/uL Final    Basophils Absolute 10/01/2021 <0.03  0.00 - 0.20 k/uL Final    Absolute Immature Granulocyte 10/01/2021 0.05  0.00 - 0.30 k/uL Final    Glucose 10/01/2021 128* 70 - 99 mg/dL Final    BUN 10/01/2021 5* 6 - 20 mg/dL Final    CREATININE 10/01/2021 0.42* 0.50 - 0.90 mg/dL Final    ICTERIC SPECIMEN    Bun/Cre Ratio 10/01/2021 NOT REPORTED  9 - 20 Final    Calcium 10/01/2021 9.0  8.6 - 10.4 mg/dL Final    Sodium 10/01/2021 134* 135 - 144 mmol/L Final    Potassium 10/01/2021 4.6  3.7 - 5.3 mmol/L Final    Chloride 10/01/2021 104  98 - 107 mmol/L Final    CO2 10/01/2021 16* 20 - 31 mmol/L Final    Anion Gap 10/01/2021 14  9 - 17 mmol/L Final    GFR Non- 10/01/2021 >60  >60 mL/min Final    GFR  10/01/2021 >60  >60 mL/min Final    GFR Comment 10/01/2021        Final    Comment: Average GFR for 20-28 years old:   80 mL/min/1.73sq m  Chronic Kidney Disease:   <60 mL/min/1.73sq m  Kidney failure:   <15 mL/min/1.73sq m              eGFR calculated using average adult body mass. Additional eGFR calculator available at:        [email or web address].br            GFR Staging 10/01/2021 NOT REPORTED   Final    Surgical Pathology Report 09/30/2021    Final                    Value:-- Diagnosis --  GALLBLADDER, CHOLECYSTECTOMY:  -CHRONIC CHOLECYSTITIS WITH CHOLELITHIASIS.       Heber Stnoer M.D  **Electronically Signed Out**         james/10/4/2021       Clinical Information  Pre-op Diagnosis:  SYMPTOMATIC CHOLECYSTITIS   Operative Findings: GALLBLADDER AND CONTENTS   Operation Performed:  LAPAROSCOPIC CHOLECYSTECTOMY     Source of Specimen  1: GALLBLADDER AND CONTENTS    Gross Description  \"KG RAMOS, GALLBLADDER AND CONTENTS\" 8.0 x 4.3 x 3.2 cm intact  gallbladder with a 1.5 cm long x 0.5 cm in diameter cystic duct. The  serosa is purple-gray, and the liver bed is coarse tan-brown. The  wall is 0.1 cm in thickness, and the lumen contains approximately 30  cc of tenacious green bile with bosselated and fragmented yellow  calculi, 3.0 x 2.3 x 1.0 cm in aggregate. The mucosa is green-tan and  slightly velvety. Cystic duct margin and sections of gallbladder  mucosa 1cs. tm      Microscopic Description  Microscopic examination performed. SURGICAL PAT                          [facility]       Patient Name: Erika Coughlin [facility] Rec: [id number]  Path Number: [id number]    [facility]  ANATOMIC PATHOLOGY  [address] [place] [address], [facility] [address]. [place], [address]  [phone]  Fax: [phone]         DIAGNOSIS & PLAN:  1. Lipoma of vulva/right labia   - Proceed with planned procedure: Excision of right labial lipomatis mass   - Consent signed, on chart. - The patient is ready for transport to the operative suite. Counseling: The patient was counseled on all options both medical and surgical, conservative as well as definitive. She has elected to proceed with the procedure as stated above. The patient was counseled on the procedure. Risks and complications were reviewed in detail. The patients orders, labs, consents have been completed. The history and physical as well as all supporting surgical documentation will be forwarded to the pre-operative holding area. The patient is aware that this procedure may not alleviate her symptoms. That there may be a necessity for a second surgery and that there may be an incomplete removal of abnormal tissue.     Tawnya Tuttle,   Ob/Gyn Resident  [facility]  1/3/2022, 10:35 AM        Attending Physician Statement  I have discussed the care of Hina Crooks, including pertinent history and exam findings,  with the resident. I have seen and examined the patient and the key elements of all parts of the encounter have been performed by me. I agree with the assessment, plan and orders as documented by the resident.      Rios Pineda MD

## 2022-01-03 NOTE — OP NOTE
[facility]                                OPERATIVE REPORT    PATIENT NAME: Galina Gu                   :        2000  MED REC NO:   [id number]                             ROOM:  ACCOUNT NO:   [de-identified]                           ADMIT DATE: 2022  PROVIDER:     Gisela Bustillo MD    DATE OF PROCEDURE:  2022    PREOPERATIVE DIAGNOSIS:  Right vulvar lesion. POSTOPERATIVE DIAGNOSIS:  Right vulvar lesion, probable lipoma. OPERATION:  Excision of large right vulvar lesion. SURGEON:  Gisela Bustillo MD    ASSISTANTS:  Aneesh Faustin DO; Lety Chawla DO; Jennifer Dueñas PA-C    ANESTHESIA:  General    ESTIMATED BLOOD LOSS:  10 mL. SPECIAL MEDICATIONS:  None. PACKS:  None. DRAINS:  None. LINES:  The patient did have a peripheral IV of lactated Ringer's during  the procedure. SCDs:  She did have SCDs during the procedure. BRIEF HISTORY AND INDICATIONS FOR OPERATION:  The patient is a  77-year-old  1, para 1 female who had a child approximately 7  months ago. It was noted during the pregnancy that she had a swelling  in the right vulva and the patient states that she had had it for about  2 years. She was referred to [facility] by her OB/GYN  and plans were made at that time to have the mass removed following her  childbirth. Her childbirth was in 2021. The patient is currently  breastfeeding. She was seen recently in the office by Dr. Randell Calzada from  [facility]. She wished to have the right vulvar mass removed. The patient was healthy, otherwise, and her workup was negative. She  was taken to the operating room on 2022. DESCRIPTION OF OPERATIVE PROCEDURE:  The patient was placed in the  dorsal lithotomy position and prepped and draped in the usual fashion. Her pubic hair was clipped.   She was prepped and draped in the usual  fashion. There was a large swelling in the right labia approximately 10 x 7 x 5  cm. It was very mobile. An incision was made with a scalpel over the incision. An ellipse of  skin was removed. Using the Metzenbaum scissors, we dissected down to the capsule and the  capsule was then dissected out completely. We did place Meghann clamps  across the base well below the palpable lipoma and these were Cheryl  suture ligated with the 0 Vicryl. Hemostasis was obtained. The wound was then cleansed with Betadine and  saline. We did place a few deep sutures of 2-0 Vicryl in interrupted  fashion to help bring the skin together. Excessive of skin was trimmed  away. The skin was then closed with 3-0 Vicryl. Cleocin cream was placed over the wound. The patient tolerated the surgery and anesthesia well. She was taken to  the recovery room in satisfactory condition.         Jayesh Kaur MD    D: 01/03/2022 13:33:33       T: 01/03/2022 13:37:22     [email or web address]  Job#: [id number]     Doc#: [id number]    CC:

## 2022-01-03 NOTE — BRIEF OP NOTE
Brief Operative Note  Department of Obstetrics and Gynecology  [facility]     Patient: Bea Fagan   : 2000  MRN: [id number]       Acct: [de-identified]   Date of Procedure: 1/3/22     Pre-operative Diagnosis: 24 y.o. female    Right labial lipomatis mass  Hx laparoscopic cholecystectomy ()  BMI 37     Post-operative Diagnosis: 24 y.o. female    Right labial lipomatis mass  Hx laparoscopic cholecystectomy ()  BMI 37     Procedure: Right labial lipomatis mass excision    Surgeon: Dr. Mariella Brown     Assistant(s): Kelsey Marti DO, PGY4; Niranjan Wagner, PGY2, Estil Beans, [place]    Anesthesia: general     Findings:  Normal appearing external genitalia with right sided labial majora lipoma about 6-7 x 3cm, normal appearing vaginal mucosa  Total IV fluids/Blood products:  700 ml crystalloid  Urine Output:  0 ml    Estimated blood loss:  10ml  Drains:  none  Specimens:  Right labial lipoma  Instrument and Sponge Count: Correct  Complications:  none  Condition:  good, transferred to post anesthesia recovery    See full operative report for further details. Kelsey Marti DO  Ob/Gyn Resident  1/3/2022, 12:55 PM        Attending Physician Statement  I have discussed the care of Bea Fagan, including pertinent history and exam findings,  with the resident. I have seen and examined the patient and the key elements of all parts of the encounter have been performed by me. I agree with the assessment, plan and orders as documented by the resident.      Homa Canales MD

## 2022-01-03 NOTE — ANESTHESIA POSTPROCEDURE EVALUATION
Department of Anesthesiology  Postprocedure Note    Patient: Phi Duarte  MRN: [id number]  YOB: 2000  Date of evaluation: 1/3/2022  Time:  1:42 PM     Procedure Summary     Date: 01/03/22 Room / Location: [address]    Anesthesia Start: 0287 Anesthesia Stop: 8181    Procedure: EXCISION LIPOMA LABIA MAJORA (Right ) Diagnosis: (LIPOMA LABIA MAJORA)    Surgeons: Yeyo Malcolm MD Responsible Provider: Solomon Yen MD    Anesthesia Type: Not recorded ASA Status: Not recorded          Anesthesia Type: No value filed. Lisa Phase I: Lisa Score: 8    Lisa Phase II:      Last vitals: Reviewed and per EMR flowsheets.        Anesthesia Post Evaluation    Patient location during evaluation: PACU  Patient participation: complete - patient participated  Level of consciousness: awake and alert  Pain score: 3  Nausea & Vomiting: no nausea  Cardiovascular status: hemodynamically stable  Respiratory status: room air

## 2022-01-04 ENCOUNTER — TELEPHONE (OUTPATIENT)
Dept: GYNECOLOGIC ONCOLOGY | Age: 22
End: 2022-01-04

## 2022-01-04 DIAGNOSIS — Z86.018 S/P EXCISION OF LIPOMA: Primary | ICD-10-CM

## 2022-01-04 DIAGNOSIS — Z98.890 S/P EXCISION OF LIPOMA: Primary | ICD-10-CM

## 2022-01-04 NOTE — TELEPHONE ENCOUNTER
Called to check in on patient regarding post operative state. She is doing well. She has generalized soreness to incision however this is relieved with alternating ibuprofen and tylenol. She is using a betadine rinse. We reviewed placement of cleocin topically on sutures. She is urinating and moving bowels without concerns. Denies nausea or vomiting. Plan for post operative check on 1/14. She is to call clinic or return sooner with any questions or concerns. Patient voiced understanding.

## 2022-01-10 LAB — SURGICAL PATHOLOGY REPORT: NORMAL

## 2022-01-14 ENCOUNTER — OFFICE VISIT (OUTPATIENT)
Dept: GYNECOLOGIC ONCOLOGY | Age: 22
End: 2022-01-14

## 2022-01-14 VITALS
WEIGHT: 232 LBS | HEIGHT: 66 IN | SYSTOLIC BLOOD PRESSURE: 135 MMHG | DIASTOLIC BLOOD PRESSURE: 86 MMHG | HEART RATE: 88 BPM | BODY MASS INDEX: 37.28 KG/M2 | OXYGEN SATURATION: 96 % | TEMPERATURE: 96.6 F

## 2022-01-14 DIAGNOSIS — D17.30 LIPOMA OF SKIN AND SUBCUTANEOUS TISSUE: Primary | ICD-10-CM

## 2022-01-14 DIAGNOSIS — Z98.890 S/P EXCISION OF LIPOMA: ICD-10-CM

## 2022-01-14 DIAGNOSIS — Z86.018 S/P EXCISION OF LIPOMA: ICD-10-CM

## 2022-01-14 PROCEDURE — 99024 POSTOP FOLLOW-UP VISIT: CPT | Performed by: PHYSICIAN ASSISTANT

## 2022-01-14 NOTE — PROGRESS NOTES
[address] [facility], [facility], [address] Alicia Hagen is a 24 y.o. female who presents for a Post Operative visit     CC/HPI: She initially presented to her local OB/GYN during her most recent pregnancy with swelling of the right vulva. She had noticed this for about 2 years. This was unchanged in size and it had not caused any tenderness. She wished to have this removed and was referred to [facility] gynecologic oncology for evaluation and surgical planning. She ultimately underwent excision of right vulvar lesion on 1/3/22    Final pathology revealed a lipoma with fat necrosis. She did well post operatively and was discharged home from PACU. She was counseled on post operative harleen-care with betadine saline rinses and topical cleocin. Today, she is now 11 days post operative state. She is doing well. She has no concerns. She has minimal to no tenderness in the vulvar region. She is doing her betadine/saline rinses with topical cleocin without concerns. She has no drainage from incision. ROS:  I have personally reviewed and agree with the review of systems done by my ancillary staff in the [facility] documentation. Objective:  Vitals:    01/14/22 0932   BP: 135/86   Pulse: 88   Temp: 96.6 °F (35.9 °C)   TempSrc: Temporal   SpO2: 96%   Weight: 232 lb (105.2 kg)   Height: 5' 6\" (1.676 m)       Physical Exam:  General: well-appearing, no acute distress    Pelvic examination reveals the right labia incision is fully closed. Vicryl sutures present throughout incision. There is no erythema or tenderness. No active drainage. No evidence of infectious process. Labia is soft with mild induration at the proximal end of incision. Expectant healing. Assessment:  Lipoma of right labia, benign    Post Operative Changes:  Stable    Discussed final pathology results with the patient, and all questions were answered.     Plan:  Follow Up Instructions:  2-3 weeks for post operative check    Continue betadine/saline rinses twice daily and blot dry.  Monitor for signs of increased redness, warmth, tenderness, or foul smelling drainage    Call or return to clinic sooner with any concerns    Electronically signed by Jocelyne Arellano PA-C on 1/14/22 at 9:17 AM EST

## 2022-01-14 NOTE — PATIENT INSTRUCTIONS
Continue betadine rinses twice daily and blot dry    Continue no intercourse or friction to the region    Return to clinic in 2-3 weeks for post operative check    Call or return to clinic sooner with any questions or concerns

## 2022-02-01 ENCOUNTER — OFFICE VISIT (OUTPATIENT)
Dept: GYNECOLOGIC ONCOLOGY | Age: 22
End: 2022-02-01

## 2022-02-01 VITALS
HEIGHT: 66 IN | SYSTOLIC BLOOD PRESSURE: 125 MMHG | TEMPERATURE: 98.2 F | DIASTOLIC BLOOD PRESSURE: 85 MMHG | HEART RATE: 70 BPM | OXYGEN SATURATION: 96 % | WEIGHT: 232 LBS | BODY MASS INDEX: 37.28 KG/M2

## 2022-02-01 DIAGNOSIS — D17.30 LIPOMA OF SKIN AND SUBCUTANEOUS TISSUE: Primary | ICD-10-CM

## 2022-02-01 PROCEDURE — 99024 POSTOP FOLLOW-UP VISIT: CPT | Performed by: PHYSICIAN ASSISTANT

## 2022-02-01 ASSESSMENT — ENCOUNTER SYMPTOMS
RESPIRATORY NEGATIVE: 1
EYES NEGATIVE: 1
GASTROINTESTINAL NEGATIVE: 1

## 2022-02-01 NOTE — PATIENT INSTRUCTIONS
Continue to keep region clean and dry    Avoid friction and intercourse for an additional 3 weeks     Follow up with OBGYN for well womans care    Call or return to clinic sooner with any questions or concerns

## 2022-02-01 NOTE — PROGRESS NOTES
[address] [facility], [facility], [address] Alicia Polk is a 24 y.o. female who presents for a Post Operative visit     CC/HPI: She initially presented to her local OB/GYN during her most recent pregnancy with swelling of the right vulva. She had noticed this for about 2 years. This was unchanged in size and it had not caused any tenderness. She wished to have this removed and was referred to [facility] gynecologic oncology for evaluation and surgical planning. She ultimately underwent excision of right vulvar lesion on 1/3/22    Final pathology revealed a benign lipoma with fat necrosis. She did well post operatively and was discharged home from PACU. She was counseled on post operative harleen-care with betadine saline rinses and topical cleocin. Today, she is now 4 weeks post operative state. She has no concerns. She denies vulvar pain or irritation. No discharge or drainage. She is not having to take any analgesics. She has continued betadine rinses and cleocin cream.    ROS:  I have personally reviewed and agree with the review of systems done by my ancillary staff in the [facility] documentation. Objective:  Vitals:    02/01/22 0928   BP: 125/85   Site: Left Upper Arm   Position: Sitting   Cuff Size: Medium Adult   Pulse: 70   Temp: 98.2 °F (36.8 °C)   SpO2: 96%   Weight: 232 lb (105.2 kg)   Height: 5' 6\" (1.676 m)       Physical Exam:  General: well-appearing, no acute distress    Pelvic examination reveals the right labia incision is fully closed. Few remaining sutures present at cephalad portion of incision. There is no erythema or tenderness. No active drainage. No evidence of infectious process. Labia is soft with mild induration at the midline. No palpable fluid collection. Expectant healing.     Assessment:  Lipoma of right labia, benign    Post Operative Changes:  Stable    Discussed final pathology results with the patient, and all questions were answered. Plan:  Follow Up Instructions:  As needed     Advised to continue to keep region clean and pat dry. No longer need for betadine rinses or cleocin cream. She should avoid friction to region and intercourse for an additional 3 weeks.     She will return her general wellness to her PCP or OB/GYN providers     Electronically signed by Virgen Nye PA-C on 2/1/2022 at 9:40 AM EST

## 2022-02-01 NOTE — PROGRESS NOTES
Review of Systems   Constitutional: Negative. HENT:  Negative. Eyes: Negative. Respiratory: Negative. Cardiovascular: Negative. Gastrointestinal: Negative. Endocrine: Negative. Genitourinary: Negative. Musculoskeletal: Negative. Skin: Negative. Neurological: Negative.

## 2024-12-08 ENCOUNTER — HOSPITAL ENCOUNTER (EMERGENCY)
Age: 24
Discharge: HOME | End: 2024-12-08
Attending: EMERGENCY MEDICINE
Payer: MEDICAID

## 2024-12-08 ENCOUNTER — APPOINTMENT (OUTPATIENT)
Dept: ULTRASOUND IMAGING | Age: 24
End: 2024-12-08
Payer: MEDICAID

## 2024-12-08 VITALS
HEART RATE: 84 BPM | DIASTOLIC BLOOD PRESSURE: 73 MMHG | TEMPERATURE: 98.1 F | OXYGEN SATURATION: 96 % | RESPIRATION RATE: 18 BRPM | SYSTOLIC BLOOD PRESSURE: 134 MMHG

## 2024-12-08 DIAGNOSIS — N93.9 VAGINAL BLEEDING: Primary | ICD-10-CM

## 2024-12-08 LAB
ALBUMIN SERPL-MCNC: 3.4 G/DL (ref 3.5–5.2)
ALBUMIN/GLOB SERPL: 1.7 {RATIO} (ref 1–2.5)
ALP SERPL-CCNC: 74 U/L (ref 35–104)
ALT SERPL-CCNC: 15 U/L (ref 10–35)
ANION GAP SERPL CALCULATED.3IONS-SCNC: 11 MMOL/L (ref 9–16)
AST SERPL-CCNC: 20 U/L (ref 10–35)
BASOPHILS # BLD: 0.05 K/UL (ref 0–0.2)
BASOPHILS NFR BLD: 1 % (ref 0–2)
BILIRUB SERPL-MCNC: <0.2 MG/DL (ref 0–1.2)
BUN SERPL-MCNC: 12 MG/DL (ref 6–20)
CALCIUM SERPL-MCNC: 8.9 MG/DL (ref 8.6–10.4)
CHLORIDE SERPL-SCNC: 107 MMOL/L (ref 98–107)
CO2 SERPL-SCNC: 21 MMOL/L (ref 20–31)
CREAT SERPL-MCNC: 0.6 MG/DL (ref 0.6–0.9)
EOSINOPHIL # BLD: 0.28 K/UL (ref 0–0.44)
EOSINOPHILS RELATIVE PERCENT: 3 % (ref 1–4)
ERYTHROCYTE [DISTWIDTH] IN BLOOD BY AUTOMATED COUNT: 14.2 % (ref 11.8–14.4)
GFR, ESTIMATED: >90 ML/MIN/1.73M2
GLUCOSE SERPL-MCNC: 82 MG/DL (ref 74–99)
HCT VFR BLD AUTO: 30 % (ref 36.3–47.1)
HGB BLD-MCNC: 10.2 G/DL (ref 11.9–15.1)
IMM GRANULOCYTES # BLD AUTO: 0.26 K/UL (ref 0–0.3)
IMM GRANULOCYTES NFR BLD: 3 %
LYMPHOCYTES NFR BLD: 2.12 K/UL (ref 1.1–3.7)
LYMPHOCYTES RELATIVE PERCENT: 23 % (ref 24–43)
MCH RBC QN AUTO: 29.1 PG (ref 25.2–33.5)
MCHC RBC AUTO-ENTMCNC: 34 G/DL (ref 28.4–34.8)
MCV RBC AUTO: 85.5 FL (ref 82.6–102.9)
MONOCYTES NFR BLD: 0.58 K/UL (ref 0.1–1.2)
MONOCYTES NFR BLD: 6 % (ref 3–12)
NEUTROPHILS NFR BLD: 64 % (ref 36–65)
NEUTS SEG NFR BLD: 6.12 K/UL (ref 1.5–8.1)
NRBC BLD-RTO: 0 PER 100 WBC
PLATELET # BLD AUTO: 148 K/UL (ref 138–453)
PMV BLD AUTO: 11.9 FL (ref 8.1–13.5)
POTASSIUM SERPL-SCNC: 3.9 MMOL/L (ref 3.7–5.3)
PROT SERPL-MCNC: 5.4 G/DL (ref 6.6–8.7)
RBC # BLD AUTO: 3.51 M/UL (ref 3.95–5.11)
SODIUM SERPL-SCNC: 139 MMOL/L (ref 136–145)
WBC OTHER # BLD: 9.4 K/UL (ref 3.5–11.3)

## 2024-12-08 PROCEDURE — 99284 EMERGENCY DEPT VISIT MOD MDM: CPT | Performed by: EMERGENCY MEDICINE

## 2024-12-08 PROCEDURE — 80053 COMPREHEN METABOLIC PANEL: CPT

## 2024-12-08 PROCEDURE — 76830 TRANSVAGINAL US NON-OB: CPT

## 2024-12-08 PROCEDURE — 85025 COMPLETE CBC W/AUTO DIFF WBC: CPT

## 2024-12-08 PROCEDURE — 76856 US EXAM PELVIC COMPLETE: CPT

## 2024-12-08 ASSESSMENT — PAIN DESCRIPTION - LOCATION: LOCATION: VAGINA

## 2024-12-08 ASSESSMENT — PAIN SCALES - GENERAL: PAINLEVEL_OUTOF10: 3

## 2024-12-08 ASSESSMENT — PAIN - FUNCTIONAL ASSESSMENT: PAIN_FUNCTIONAL_ASSESSMENT: 0-10

## 2024-12-08 NOTE — CONSULTS
negative myalgias, negative arthralgias  Neurological:  negative dizziness, negative weakness  Behavior/Psych: negative depression, negative anxiety    OBSTETRIC HISTORY:   OB History    Para Term  AB Living   1 1 1 0 0 1   SAB IAB Ectopic Molar Multiple Live Births   0 0 0 0 0 1      # Outcome Date GA Lbr John/2nd Weight Sex Type Anes PTL Lv   1 Term 21 37w6d / 00:59 2.82 kg (6 lb 3.5 oz) F Vag-Spont EPI N OPHELIA      Name: RACHEL,BABY GIRL KG      Apgar1: 7  Apgar5: 9       PAST MEDICAL HISTORY:   has a past medical history of No pertinent past medical history.    PAST SURGICAL HISTORY:   has a past surgical history that includes [place] tooth extraction; Cholecystectomy, laparoscopic (N/A, 2021); Vulva surgery (2022); and vulvar/perineal biopsy (Right, 1/3/2022).    ALLERGIES:  Allergies as of 2024    (No Known Allergies)       MEDICATIONS:  No current facility-administered medications for this encounter.     No current outpatient medications on file.     FAMILY HISTORY:  family history includes Diabetes in her maternal grandmother.    SOCIAL HISTORY:   reports that she has never smoked. She has never used smokeless tobacco. She reports that she does not currently use alcohol. She reports that she does not use drugs.                     VITALS:  Vitals:    24 0729 24 1026   BP: (!) 144/84 134/73   Pulse: 94 84   Resp: 18    Temp: 98.1 °F (36.7 °C)    TempSrc: Oral    SpO2: 96% 96%                                                                                                                                  PHYSICAL EXAM:     General appearance:  no apparent distress, alert, and cooperative. Non-toxic appearing, hemodynamically stable  HEENT: head atraumatic, normocephalic  Neurologic:  alert, oriented, normal speech, no focal findings or movement disorder noted  Lungs:  No increased work of breathing noted  Heart:  regular rate  Abdomen:  soft,  and non-tender,

## 2024-12-08 NOTE — ED TRIAGE NOTES
Pt to ed c/o vaginal bleeding. Pt had a vaginal delivery on 12/5. Pt states this morning she passed a clot the size of the palm of her hand. Pt states since passing the clot, she has been continuously bleeding. Pt denies any abdominal pain.     Pt is alert and oriented x4. Ambulatory to room. Vitals stable. Call light in reach. Will continue with plan of care.

## 2024-12-08 NOTE — ED PROVIDER NOTES
[facility]     Emergency Department     Faculty Attestation    I performed a history and physical examination of the patient and discussed management with the resident. I reviewed the resident's note and agree with the documented findings and plan of care. Any areas of disagreement are noted on the chart. I was personally present for the key portions of any procedures. I have documented in the chart those procedures where I was not present during the key portions. I have reviewed the emergency nurses triage note. I agree with the chief complaint, past medical history, past surgical history, allergies, medications, social and family history as documented unless otherwise noted below. For Physician Assistant/ Nurse Practitioner cases/documentation I have personally evaluated this patient and have completed at least one if not all key elements of the E/M (history, physical exam, and MDM). Additional findings are as noted.    Vaginal delivery at the [place] 3 days ago, now passing clots, blood type is O-, abdomen is soft and nontender.     Nate Arana MD  12/08/24 5772    
sounds: Normal breath sounds.   Abdominal:      Palpations: Abdomen is soft.      Tenderness: There is no abdominal tenderness.   Genitourinary:     Comments: Vaginal bleeding  Musculoskeletal:         General: No tenderness. Normal range of motion.   Skin:     General: Skin is warm and dry.      Findings: No rash.   Neurological:      Mental Status: She is alert and oriented to person, place, and time.           DDX/DIAGNOSTIC RESULTS / EMERGENCY DEPARTMENT COURSE / MDM     Medical Decision Making  Amount and/or Complexity of Data Reviewed  Labs: ordered.  Radiology: ordered. Decision-making details documented in ED Course.    24-year-old female presented to ED with vaginal bleeding and passage of clots which acutely worsened this morning.  Patient recently delivered on 12/5/2024.  Denies any episodes of fever/chills or abdominal pain.  Will obtain CBC, CMP followed by transvaginal ultrasound to rule out retained products.  Will possibly consult OB/GYN for further recommendations    EKG      All EKG's are interpreted by the Emergency Department Physician who either signs or Co-signs this chart in the absence of a cardiologist.    EMERGENCY DEPARTMENT COURSE:      ED Course as of 12/08/24 1535   Sun Dec 08, 2024   0945 US PELVIS COMPLETE [AS]   1000 CBC with Auto Differential(!):    WBC 9.4   RBC 3.51(!)   Hemoglobin Quant 10.2(!)   Hematocrit 30.0(!)   MCV 85.5   MCH 29.1   MCHC 34.0   RDW 14.2   Platelet Count 148   MPV 11.9   NRBC Automated 0.0   Neutrophils % 64   Lymphocyte % 23(!)   Monocytes % 6   Eosinophils % 3   Basophils % 1   Immature Granulocytes % 3(!)   Neutrophils Absolute 6.12   Lymphocytes Absolute 2.12   Monocytes Absolute 0.58   Eosinophils Absolute 0.28   Basophils Absolute 0.05   Immature Granulocytes Absolute 0.26 [AS]   1005 Comprehensive Metabolic Panel(!):    Sodium 139   Potassium 3.9   Chloride 107   CARBON DIOXIDE 21   Anion Gap 11   Glucose 82   BUN,BUNPL 12   Creatinine 0.6   Est, Glom

## 2024-12-08 NOTE — DISCHARGE INSTRUCTIONS
You are seen in the ED for postpartum vaginal bleeding.  Ultrasound did show some concerns for mild retained products however OB/GYN did evaluate you and states that this is physiologic at this time.    Please follow-up with OB/GYN in the outpatient setting within the next week.  Information for scheduling appointment with our OB/GYN specialist has been provided.    Please return to the ED if experience any increase in abdominal pain, bleeding, fever/chills or no improvement symptom

## (undated) DEVICE — PAD,SANITARY,11 IN,MAXI,W/WINGS,N-STRL: Brand: MEDLINE

## (undated) DEVICE — APPLIER CLP M L L11.4IN DIA10MM ENDOSCP ROT MULT FOR LIG

## (undated) DEVICE — ADHESIVE SKIN CLOSURE TOP 36 CC HI VISC DERMBND MINI

## (undated) DEVICE — VINEGAR      6GAL/CS

## (undated) DEVICE — COUNTER NDL 10 COUNT HLD 20 FOAM BLK SGL MAG

## (undated) DEVICE — TOTAL TRAY, 16FR 10ML SIL FOLEY, URN: Brand: MEDLINE

## (undated) DEVICE — TROCAR: Brand: KII FIOS FIRST ENTRY

## (undated) DEVICE — PLUMEPORT SEO LAPAROSCOPIC SMOKE FILTRATION DEVICE: Brand: PLUMEPORT

## (undated) DEVICE — PACK LAP BASIC

## (undated) DEVICE — CLIP INT L POLYMER LOK LIG HEM O LOK

## (undated) DEVICE — GLOVE SURG SZ 6 THK91MIL LTX FREE SYN POLYISOPRENE ANTI

## (undated) DEVICE — SOLUTION SCRB 4OZ 10% POVIDONE IOD ANTIMIC BTL

## (undated) DEVICE — SCISSOR SURG METZ CRV TIP

## (undated) DEVICE — MITT SURG PREP L ADH DISPOSABLE

## (undated) DEVICE — TOWEL,OR,DSP,ST,NATURAL,DLX,4/PK,20PK/CS: Brand: MEDLINE

## (undated) DEVICE — NEEDLE SPINAL 22GA L3.5IN SPINOCAN

## (undated) DEVICE — SUTURE MCRYL SZ 4-0 L18IN ABSRB UD L16MM PC-3 3/8 CIR PRIM Y845G

## (undated) DEVICE — DISSECTOR LAP DIA5MM BLNT TIP ENDOPATH

## (undated) DEVICE — SYRINGE MED 10ML TRNSLUC BRL PLUNG BLK MRK POLYPR CTRL

## (undated) DEVICE — Z DISCONTINUED BY MEDLINE USE 2711682 TRAY SKIN PREP DRY W/ PREM GLV

## (undated) DEVICE — GLOVE ORANGE PI 7   MSG9070

## (undated) DEVICE — SOLUTION SCRB 4OZ 4% CHG H2O AIDED FOR PREOPERATIVE SKIN

## (undated) DEVICE — SUTURE ABSRB BRAID COAT VLT SH 3-0 27IN VCRL J311H

## (undated) DEVICE — UNDERPANTS MAT L XL SEAMLESS CLR CODE WAISTBAND KNIT

## (undated) DEVICE — SUTURE VCRL + SZ 0 L27IN ABSRB VLT L26MM UR-6 5/8 CIR VCP603H

## (undated) DEVICE — SYRINGE IRRIG 60ML SFT PLIABLE BLB EZ TO GRP 1 HND USE W/

## (undated) DEVICE — DRAPE,REIN 53X77,STERILE: Brand: MEDLINE

## (undated) DEVICE — SVMMC GYN MIN PK

## (undated) DEVICE — BLADE CLIPPER GEN PURP NS

## (undated) DEVICE — GLOVE SURG SZ 65 THK91MIL LTX FREE SYN POLYISOPRENE

## (undated) DEVICE — ELECTRODE ELECSURG NDL 2.8 INX7.2 CM COAT INSUL EDGE

## (undated) DEVICE — TROCAR: Brand: KII® SLEEVE

## (undated) DEVICE — INTENDED FOR TISSUE SEPARATION, AND OTHER PROCEDURES THAT REQUIRE A SHARP SURGICAL BLADE TO PUNCTURE OR CUT.: Brand: BARD-PARKER ® CARBON RIB-BACK BLADES

## (undated) DEVICE — SOLUTION ANTIFOG VIS SYS CLEARIFY LAPSCP

## (undated) DEVICE — GOWN,SIRUS,NONRNF,SETINSLV,XL,20/CS: Brand: MEDLINE

## (undated) DEVICE — JAR BONE ST

## (undated) DEVICE — SPONGE,PEANUT,XRAY,ST,SM,3/8",5/CARD: Brand: MEDLINE INDUSTRIES, INC.

## (undated) DEVICE — TISSUE RETRIEVAL SYSTEM: Brand: INZII RETRIEVAL SYSTEM

## (undated) DEVICE — INSUFFLATION NEEDLE TO ESTABLISH PNEUMOPERITONEUM.: Brand: INSUFFLATION NEEDLE

## (undated) DEVICE — SYRINGE MED 10ML LUERLOCK TIP W/O SFTY DISP

## (undated) DEVICE — SOLUTION PREP POVIDONE IOD FOR SKIN MUCOUS MEM PRIOR TO

## (undated) DEVICE — NEPTUNE E-SEP SMOKE EVACUATION PENCIL, COATED, 70MM BLADE, PUSH BUTTON SWITCH: Brand: NEPTUNE E-SEP

## (undated) DEVICE — BLUNTFILL WITH FILTER: Brand: MONOJECT

## (undated) DEVICE — SUTURE ABSORBABLE BRAIDED 2-0 CT-1 27 IN UD VICRYL J259H

## (undated) DEVICE — ELECTRODE LAPAROSCOPIC LHOOK

## (undated) DEVICE — COUNTER NDL 40 COUNT HLD 70 FOAM BLK ADH W/ MAG

## (undated) DEVICE — GLOVE ORANGE PI 8   MSG9080

## (undated) DEVICE — GLOVE ORANGE PI 7 1/2   MSG9075

## (undated) DEVICE — SUTURE VCRL SZ 2-0 L27IN ABSRB UD L26MM SH 1/2 CIR J417H

## (undated) DEVICE — TUBING, SUCTION, 9/32" X 20', STRAIGHT: Brand: MEDLINE INDUSTRIES, INC.

## (undated) DEVICE — Device

## (undated) DEVICE — APPLICATOR MEDICATED 26 CC SOLUTION HI LT ORNG CHLORAPREP

## (undated) DEVICE — TROCAR: Brand: KII SHIELDED BLADED ACCESS SYSTEM

## (undated) DEVICE — TROCARS: Brand: KII® BALLOON BLUNT TIP SYSTEM

## (undated) DEVICE — GLOVE SURG SZ 8 L12IN FNGR THK79MIL GRN LTX FREE

## (undated) DEVICE — SUTURE VCRL SZ 0 L36IN ABSRB UD L36MM CT-1 1/2 CIR J946H

## (undated) DEVICE — 1016 S-DRAPE IRRIG POUCH 10/BOX: Brand: STERI-DRAPE™